# Patient Record
Sex: MALE | Race: BLACK OR AFRICAN AMERICAN | NOT HISPANIC OR LATINO | URBAN - METROPOLITAN AREA
[De-identification: names, ages, dates, MRNs, and addresses within clinical notes are randomized per-mention and may not be internally consistent; named-entity substitution may affect disease eponyms.]

---

## 2021-01-25 ENCOUNTER — EMERGENCY (EMERGENCY)
Facility: HOSPITAL | Age: 51
LOS: 1 days | Discharge: HOME | End: 2021-01-25
Attending: EMERGENCY MEDICINE | Admitting: EMERGENCY MEDICINE
Payer: MEDICAID

## 2021-01-25 VITALS
HEART RATE: 82 BPM | TEMPERATURE: 99 F | OXYGEN SATURATION: 100 % | RESPIRATION RATE: 17 BRPM | SYSTOLIC BLOOD PRESSURE: 121 MMHG | DIASTOLIC BLOOD PRESSURE: 69 MMHG

## 2021-01-25 VITALS
RESPIRATION RATE: 18 BRPM | HEART RATE: 95 BPM | SYSTOLIC BLOOD PRESSURE: 145 MMHG | TEMPERATURE: 98 F | DIASTOLIC BLOOD PRESSURE: 100 MMHG | OXYGEN SATURATION: 99 %

## 2021-01-25 LAB
ALBUMIN SERPL ELPH-MCNC: 4.1 G/DL — SIGNIFICANT CHANGE UP (ref 3.3–5)
ALP SERPL-CCNC: 86 U/L — SIGNIFICANT CHANGE UP (ref 40–120)
ALT FLD-CCNC: 8 U/L — SIGNIFICANT CHANGE UP (ref 4–41)
ANION GAP SERPL CALC-SCNC: 12 MMOL/L — SIGNIFICANT CHANGE UP (ref 7–14)
AST SERPL-CCNC: 9 U/L — SIGNIFICANT CHANGE UP (ref 4–40)
BASOPHILS # BLD AUTO: 0.02 K/UL — SIGNIFICANT CHANGE UP (ref 0–0.2)
BASOPHILS NFR BLD AUTO: 0.2 % — SIGNIFICANT CHANGE UP (ref 0–2)
BILIRUB SERPL-MCNC: 0.8 MG/DL — SIGNIFICANT CHANGE UP (ref 0.2–1.2)
BUN SERPL-MCNC: 15 MG/DL — SIGNIFICANT CHANGE UP (ref 7–23)
CALCIUM SERPL-MCNC: 8.9 MG/DL — SIGNIFICANT CHANGE UP (ref 8.4–10.5)
CHLORIDE SERPL-SCNC: 104 MMOL/L — SIGNIFICANT CHANGE UP (ref 98–107)
CO2 SERPL-SCNC: 26 MMOL/L — SIGNIFICANT CHANGE UP (ref 22–31)
CREAT SERPL-MCNC: 1.2 MG/DL — SIGNIFICANT CHANGE UP (ref 0.5–1.3)
EOSINOPHIL # BLD AUTO: 0.01 K/UL — SIGNIFICANT CHANGE UP (ref 0–0.5)
EOSINOPHIL NFR BLD AUTO: 0.1 % — SIGNIFICANT CHANGE UP (ref 0–6)
GLUCOSE SERPL-MCNC: 91 MG/DL — SIGNIFICANT CHANGE UP (ref 70–99)
HCT VFR BLD CALC: 47.8 % — SIGNIFICANT CHANGE UP (ref 39–50)
HGB BLD-MCNC: 14.9 G/DL — SIGNIFICANT CHANGE UP (ref 13–17)
IANC: 6.93 K/UL — SIGNIFICANT CHANGE UP (ref 1.5–8.5)
IMM GRANULOCYTES NFR BLD AUTO: 0.8 % — SIGNIFICANT CHANGE UP (ref 0–1.5)
LYMPHOCYTES # BLD AUTO: 2.05 K/UL — SIGNIFICANT CHANGE UP (ref 1–3.3)
LYMPHOCYTES # BLD AUTO: 20 % — SIGNIFICANT CHANGE UP (ref 13–44)
MAGNESIUM SERPL-MCNC: 1.9 MG/DL — SIGNIFICANT CHANGE UP (ref 1.6–2.6)
MCHC RBC-ENTMCNC: 28.8 PG — SIGNIFICANT CHANGE UP (ref 27–34)
MCHC RBC-ENTMCNC: 31.2 GM/DL — LOW (ref 32–36)
MCV RBC AUTO: 92.3 FL — SIGNIFICANT CHANGE UP (ref 80–100)
MONOCYTES # BLD AUTO: 1.15 K/UL — HIGH (ref 0–0.9)
MONOCYTES NFR BLD AUTO: 11.2 % — SIGNIFICANT CHANGE UP (ref 2–14)
NEUTROPHILS # BLD AUTO: 6.93 K/UL — SIGNIFICANT CHANGE UP (ref 1.8–7.4)
NEUTROPHILS NFR BLD AUTO: 67.7 % — SIGNIFICANT CHANGE UP (ref 43–77)
NRBC # BLD: 0 /100 WBCS — SIGNIFICANT CHANGE UP
NRBC # FLD: 0 K/UL — SIGNIFICANT CHANGE UP
PHOSPHATE SERPL-MCNC: 2.7 MG/DL — SIGNIFICANT CHANGE UP (ref 2.5–4.5)
PLATELET # BLD AUTO: 187 K/UL — SIGNIFICANT CHANGE UP (ref 150–400)
POTASSIUM SERPL-MCNC: 4.1 MMOL/L — SIGNIFICANT CHANGE UP (ref 3.5–5.3)
POTASSIUM SERPL-SCNC: 4.1 MMOL/L — SIGNIFICANT CHANGE UP (ref 3.5–5.3)
PROT SERPL-MCNC: 7.4 G/DL — SIGNIFICANT CHANGE UP (ref 6–8.3)
RBC # BLD: 5.18 M/UL — SIGNIFICANT CHANGE UP (ref 4.2–5.8)
RBC # FLD: 12.7 % — SIGNIFICANT CHANGE UP (ref 10.3–14.5)
SODIUM SERPL-SCNC: 142 MMOL/L — SIGNIFICANT CHANGE UP (ref 135–145)
WBC # BLD: 10.24 K/UL — SIGNIFICANT CHANGE UP (ref 3.8–10.5)
WBC # FLD AUTO: 10.24 K/UL — SIGNIFICANT CHANGE UP (ref 3.8–10.5)

## 2021-01-25 PROCEDURE — 71045 X-RAY EXAM CHEST 1 VIEW: CPT | Mod: 26

## 2021-01-25 PROCEDURE — 99283 EMERGENCY DEPT VISIT LOW MDM: CPT

## 2021-01-25 NOTE — ED ADULT TRIAGE NOTE - CHIEF COMPLAINT QUOTE
pt w hx of achalasia states "I need a barium swallow and my esophagus stretched" pt reports procedure must be done every 6 months , and is having difficulty swallowing. pt maintaining airway, able to clear secretions. pt denies chest pain, sob.

## 2021-01-25 NOTE — ED PROVIDER NOTE - NS ED ROS FT
CONST: no fevers, no chills, no lightheadedness  HEENT: no vision change, no sore throat + dysphagia   CV: no chest pain, no palpitations  RESP: no cough, no shortness of breath  ABD: no abdominal pain, + vomiting, no diarrhea   : no dysuria, no hematuria  ENDO: no frequent urination, no unusual thirst  MSK: no musculoskeletal pain  NEURO: no headache, no focal weakness, no loss of sensation  SKIN:  no rash

## 2021-01-25 NOTE — ED PROVIDER NOTE - NSFOLLOWUPINSTRUCTIONS_ED_ALL_ED_FT
Please follow up with your primary care provider for further concerns you may have regarding your general health. Attached you will find your results from today's visit. Continue taking your medications as prescribed and keep your upcoming medical appointments.    Please follow up once you are contacted regarding a follow up appointment with the gastrointestinal specialist in several days.

## 2021-01-25 NOTE — ED PROVIDER NOTE - DOMESTIC TRAVEL HIGH RISK QUESTION
No [FreeTextEntry1] : Lamotrigine 150mg BID   12/12 Lamotrigine level therapeutic at 4.6\par \par UPDATE:  \par Last sz 9/2019.\par He is doing well with no reported interval seizures.\par His mood is good an he has no complaints or side effects.\par Some sleep issues/sleep hygiene (goes to bed around 12-5AM).  Dec caffiene\par exercise\par \par PMHX: Healthy other than onset of seizures around 2017. GTCS, no warning, amnesia, diurnal.  3min duration. sore afterwards, scrapes to face/hands. tongue bite. confusion. wakes up with EMS.\par Chronic sleep issues.  Irregular sleep habits. q4-8mo with sleep deprivation. Estim 5-6 szs lifetime\par H/o MJ use. No current other ETOH or drug use.\par No myoclonus\par \par Saw one neurologist 2/2019, took LEV for 1 week, then d/c''d due to mood changes.\par \par No FmHx seizures.\par  [Right Handed] : right handed [Stress] : stress [Sleep Deprivation] : sleep deprivation [Tongue Biting] : tongue biting [Postictal Confusion and Lethargy] : postictal confusion and lethargy [[place] Status Epilepticus] : no [] : yes [Levetiracetam (Keppra)] : levetiracetam (Keppra)

## 2021-01-25 NOTE — ED PROVIDER NOTE - ATTENDING CONTRIBUTION TO CARE
I performed a history and physical exam of the patient and discussed their management with the resident and /or advanced care provider. I reviewed the resident and /or ACP's note and agree with the documented findings and plan of care. My medical decision making and observations are found above.  Lungs clear, abd soft, awake alert, mouth moist.

## 2021-01-25 NOTE — ED ADULT NURSE NOTE - OBJECTIVE STATEMENT
Pt received AOx4, ambulatory at baseline presents to the ED with chief complaint of difficulty swallowing. Pt states he has PMHX of achalasia and gets his esophagus dilated every 6 months. Pt received AOx4, ambulatory at baseline presents to the ED with chief complaint of difficulty swallowing. Pt states he has PMHX of achalasia and gets his esophagus dilated every 6 months. Pt states he has been unable to keep anything down for the past couple of weeks. Complaining of hunger at the moment. Vs as noted. 20G placed in LAC. Labs sent. No other pertinent MHX. Denies CP, SOB, NVD, dysuria, chills, fever, cough at the moment. Breathing even and unlabored, saturated 100% RA. Fluent Speech.

## 2021-01-25 NOTE — ED PROVIDER NOTE - PHYSICAL EXAMINATION
Gen: WDWN, NAD  HEENT: EOMI, no nasal discharge, mucous membranes moist  CV: RRR, no M/R/G  Resp: CTAB, no W/R/R  GI: Abdomen soft non-distended, NTTP, no masses  MSK: No open wounds, no bruising, no LE edema  Neuro: A&Ox4, following commands, moving all four extremities spontaneously  Psych: appropriate mood, denies AH, VH, SI

## 2021-01-25 NOTE — ED ADULT NURSE NOTE - NSIMPLEMENTINTERV_GEN_ALL_ED
Implemented All Universal Safety Interventions:  [place] to call system. Call bell, personal items and telephone within reach. Instruct patient to call for assistance. Room bathroom lighting operational. Non-slip footwear when patient is off stretcher. Physically safe environment: no spills, clutter or unnecessary equipment. Stretcher in lowest position, wheels locked, appropriate side rails in place.

## 2021-01-25 NOTE — ED PROVIDER NOTE - OBJECTIVE STATEMENT
50M hx achalasia (reports hx >7 pneumatic dilations, hx of failed surgical procedure) p/w difficulty swallowing. States in past has required esophageal dilations "every 6 months" for >10 years however for past 2 years has not received procedure. 50M hx achalasia (reports hx >7 pneumatic dilations, hx of failed surgical procedure of unknown type) p/w difficulty swallowing. States in past has required esophageal dilations "every 6 months" for >10 years however for past 2 years has not received procedure, endorses decreased PO intake 2/2 regurgitation, inability to tolerate solids/liquids. Has not seeked outpatient help, comes in requesting barium swallow & surgical evaluation.

## 2021-01-25 NOTE — ED PROVIDER NOTE - CLINICAL SUMMARY MEDICAL DECISION MAKING FREE TEXT BOX
Demetrio: increasing difficulty swallowing. Hx of achalasia for years. use to have dilatations not recently. Will assess dehydration and nutritional assessment, hydrate if nessisary and ensure access to GI. Demetrio: increasing difficulty swallowing. Hx of achalasia for years. use to have dilatations not recently. Will assess dehydration and nutritional assessment, hydrate if necessary and ensure access to GI.

## 2021-01-25 NOTE — ED PROVIDER NOTE - PATIENT PORTAL LINK FT
You can access the FollowMyHealth Patient Portal offered by [facility] by registering at the following website: [email or web address]. By joining [facility]’s FollowMyHealth portal, you will also be able to view your health information using other applications (apps) compatible with our system.

## 2021-02-08 PROBLEM — Z00.00 ENCOUNTER FOR PREVENTIVE HEALTH EXAMINATION: Status: ACTIVE | Noted: 2021-02-08

## 2021-02-09 ENCOUNTER — APPOINTMENT (OUTPATIENT)
Dept: GASTROENTEROLOGY | Facility: CLINIC | Age: 51
End: 2021-02-09
Payer: MEDICAID

## 2021-02-09 PROCEDURE — 99204 OFFICE O/P NEW MOD 45 MIN: CPT

## 2021-02-09 PROCEDURE — 99072 ADDL SUPL MATRL&STAF TM PHE: CPT

## 2021-02-09 NOTE — HISTORY OF PRESENT ILLNESS
[FreeTextEntry1] : 51 yo male with h/o achalasia, since 2001, s/p surgery details not known in 2001. Surgery did not work, s/p dilation of esophagus,2019 and then was told to have botox shots.\par \par Patient reports difficulty swallowing liquids and solids, throwing up liquids. Last episode of vomiting last night. 20lb weight loss in past few months, no abdominal pain. GERD every week.normal bms, no brbpr, no melena.\par \par no family h/o colon ,esophageal or gastric cancer

## 2021-02-09 NOTE — PHYSICAL EXAM
[General Appearance - Alert] : alert [General Appearance - In No Acute Distress] : in no acute distress [General Appearance - Well Nourished] : well nourished [Sclera] : the sclera and conjunctiva were normal [General Appearance - Well Developed] : well developed [Respiration, Rhythm And Depth] : normal respiratory rhythm and effort [Auscultation Breath Sounds / Voice Sounds] : lungs were clear to auscultation bilaterally [Heart Sounds] : normal S1 and S2 [Bowel Sounds] : normal bowel sounds [Abdomen Soft] : soft [Abdomen Tenderness] : non-tender [No CVA Tenderness] : no ~M costovertebral angle tenderness [] : no rash [Skin Lesions] : no skin lesions [Hearing Threshold Finger Rub Not [place]] : hearing was normal [Sensation] : the sensory exam was normal to light touch and pinprick [No Focal Deficits] : no focal deficits [Oriented To Time, Place, And Person] : oriented to person, place, and time [FreeTextEntry1] : well healed scar

## 2021-02-09 NOTE — REVIEW OF SYSTEMS
[As Noted in HPI] : as noted in HPI [Fever] : no fever [Chills] : no chills [Nosebleeds] : no nosebleeds [Eyesight Problems] : no eyesight problems [Cough] : no cough [SOB on Exertion] : no shortness of breath during exertion [Hesitancy] : no urinary hesitancy [Joint Swelling] : no joint swelling [Itching] : no itching [Confused] : no confusion [Anxiety] : no anxiety [Depression] : no depression [Muscle Weakness] : no muscle weakness [Easy Bleeding] : no tendency for easy bleeding [Easy Bruising] : no tendency for easy bruising

## 2021-02-09 NOTE — ASSESSMENT
[FreeTextEntry1] : h/o achalasia, since 2001, s/p failed esophageal surgery per patient in 2001 and has received multiple dilations since, details not available. Patient was in [place] for his procedures. NOw with recurrent dysphagia to solids and liquids\par \par plan ;full liquid diet,pureed food\par          BArium esophagram\par            pending above result will refer to esophageal specialist  for egd/manometry and treatment options

## 2021-03-08 ENCOUNTER — APPOINTMENT (OUTPATIENT)
Dept: RADIOLOGY | Facility: HOSPITAL | Age: 51
End: 2021-03-08
Payer: MEDICAID

## 2021-03-08 ENCOUNTER — OUTPATIENT (OUTPATIENT)
Dept: OUTPATIENT SERVICES | Facility: HOSPITAL | Age: 51
LOS: 1 days | End: 2021-03-08

## 2021-03-08 ENCOUNTER — RESULT REVIEW (OUTPATIENT)
Age: 51
End: 2021-03-08

## 2021-03-08 DIAGNOSIS — K22.0 ACHALASIA OF CARDIA: ICD-10-CM

## 2021-03-08 PROCEDURE — 74220 X-RAY XM ESOPHAGUS 1CNTRST: CPT | Mod: 26

## 2021-03-10 ENCOUNTER — NON-APPOINTMENT (OUTPATIENT)
Age: 51
End: 2021-03-10

## 2021-04-06 ENCOUNTER — APPOINTMENT (OUTPATIENT)
Dept: GASTROENTEROLOGY | Facility: CLINIC | Age: 51
End: 2021-04-06
Payer: MEDICAID

## 2021-04-06 VITALS
OXYGEN SATURATION: 97 % | TEMPERATURE: 98 F | SYSTOLIC BLOOD PRESSURE: 130 MMHG | RESPIRATION RATE: 18 BRPM | DIASTOLIC BLOOD PRESSURE: 60 MMHG | BODY MASS INDEX: 23.8 KG/M2 | HEART RATE: 94 BPM | HEIGHT: 71 IN | WEIGHT: 170 LBS

## 2021-04-06 DIAGNOSIS — Z78.9 OTHER SPECIFIED HEALTH STATUS: ICD-10-CM

## 2021-04-06 DIAGNOSIS — R07.89 OTHER CHEST PAIN: ICD-10-CM

## 2021-04-06 PROCEDURE — 99072 ADDL SUPL MATRL&STAF TM PHE: CPT

## 2021-04-06 PROCEDURE — 99205 OFFICE O/P NEW HI 60 MIN: CPT

## 2021-04-06 NOTE — PHYSICAL EXAM
[General Appearance - Alert] : alert [General Appearance - In No Acute Distress] : in no acute distress [General Appearance - Well Nourished] : well nourished [Extraocular Movements] : extraocular movements were intact [Sclera] : the sclera and conjunctiva were normal [Outer Ear] : the ears and nose were normal in appearance [Hearing Threshold Finger Rub Not [place]] : hearing was normal [Neck Appearance] : the appearance of the neck was normal [Neck Cervical Mass (___cm)] : no neck mass was observed [Respiration, Rhythm And Depth] : normal respiratory rhythm and effort [Exaggerated Use Of Accessory Muscles For Inspiration] : no accessory muscle use [Auscultation Breath Sounds / Voice Sounds] : lungs were clear to auscultation bilaterally [Heart Rate And Rhythm] : heart rate was normal and rhythm regular [Heart Sounds] : normal S1 and S2 [Bowel Sounds] : normal bowel sounds [Edema] : there was no peripheral edema [Abdomen Soft] : soft [Abdomen Tenderness] : non-tender [Abdomen Mass (___ Cm)] : no abdominal mass palpated [Cervical Lymph Nodes Enlarged Posterior Bilaterally] : posterior cervical [Cervical Lymph Nodes Enlarged Anterior Bilaterally] : anterior cervical [No CVA Tenderness] : no ~M costovertebral angle tenderness [Supraclavicular Lymph Nodes Enlarged Bilaterally] : supraclavicular [No Spinal Tenderness] : no spinal tenderness [Abnormal Walk] : normal gait [Involuntary Movements] : no involuntary movements were seen [Skin Color & Pigmentation] : normal skin color and pigmentation [] : no rash [No Focal Deficits] : no focal deficits [Impaired Insight] : insight and judgment were intact [Affect] : the affect was normal [FreeTextEntry1] : aox3

## 2021-04-06 NOTE — HISTORY OF PRESENT ILLNESS
[FreeTextEntry1] : 51 yo M pmh Achalasia s/p 'surgery' 2001, s/p dilation 2019 (suspected 30 mm PD) who presents to establish GI care for achalasia.\par \par Achalasia:\par 2001 - started having vomiting and regurgitation\par Having severe dysphagia to liquids and solids\par Having significant weight loss\par Ultimately had BaS and suspected to have Achalasia\par Does not think he had manometry at that time, though is unsure (he had it at some point)\par Underwent surgery -> suspected to be myotomy though he reports the cut was 'too deep' and there were issues\par \par Had failure within 4 weeks with recurrence of symptoms\par Underwent dilations over next few years\par He believes the last one was about 2 years ago\par He believes they were Pneumatic Dilations\par \par Overall he just didn't really manage it and just 'dealt with it'\par He was incarcerated in [place] for much of the time - so wasn’t in full control of his own care\par He went to see Dr. Rojas for evaluation, got a BaS and was referred here\par \par Currently:\par Dysphagia - Every Meal\par Regurgitation - every week\par Chest Pain - Intermittent\par Heartburn daily \par Weight loss - 40 lbs\par \par Workup - \par EGD - 2019\par BaS - 2021\par E-mano - 2005 \par \par Prior GI note:\par Chandini:\par Achalasia (530.0) (K22.0)\par History of Laparoscopy\par No pertinent family history : Family History\par \par h/o achalasia, since 2001, s/p failed esophageal surgery per patient in 2001 and has received multiple dilations since, details not available. Patient was in [place] for his procedures. NOw with recurrent dysphagia to solids and liquids\par \par plan ;full liquid diet,pureed food\par  BArium esophagram\par  pending above result will refer to esophageal specialist for egd/manometry and treatment options. \par \par \par Prior Workup:\par BaS 2/2021\par FINDINGS:\par \par Preliminary  radiograph of the chest is unremarkable.\par \par The patient swallowed barium without difficulty. The esophagus is markedly dilated with intraluminal debris. There is no abnormal extrinsic mass effect. Contrast passes slowly through the gastroesophageal junction into a normal-appearing stomach. No gastroesophageal reflux was demonstrated during this examination. The swallowed barium tablet is retained above the gastroesophageal junction, within ingested barium pole.\par \par IMPRESSION:\par Dilated debris-filled esophagus consistent with known achalasia.\par Oral contrast transits through the region of the gastroesophageal junction.

## 2021-04-06 NOTE — ASSESSMENT
[FreeTextEntry1] : 49 yo M pmh Achalasia s/p 'surgery' 2001, s/p dilation 2019 (suspected 30 mm PD) who presents to establish GI care.  Having clearly significant symptoms with eckardt of 7.  Unclear if this is end-stage achalasia or not given some dilation and angulation on Barium Swallow and prior failed therapies.  Needs full evaluation.\par \par Impression:\par 1) Achalasia - Eckardt = 7\par 2) Dysphagia\par 3) Weight Loss\par 4) Avg risk for CRC\par \par Plan:\par 1) EGD + E-mano +/- EndoFLIP\par 2) Pending above, will need to consider if myotomy would be of clinical benefit\par 3) We had extended discussion regarding achalasia and its natural progression.  We discussed potential treatment options both for end-stage achalasia (i.e. esophagectomy) and for non-end stage achalasia (i.e. myotomy, PD, botox, meds).\par 4) After addressing the above, we will need to get him set up for CRC screening via colonoscopy or other modality\par 5) Extended visit with multiple questions discussed.  Review of Barium performed independently. Plan agreed upon\par 6) RV pending above

## 2021-04-25 ENCOUNTER — APPOINTMENT (OUTPATIENT)
Dept: DISASTER EMERGENCY | Facility: CLINIC | Age: 51
End: 2021-04-25

## 2021-04-25 LAB — SARS-COV-2 N GENE NPH QL NAA+PROBE: NOT DETECTED

## 2021-04-28 ENCOUNTER — OUTPATIENT (OUTPATIENT)
Dept: OUTPATIENT SERVICES | Facility: HOSPITAL | Age: 51
LOS: 1 days | End: 2021-04-28
Payer: MEDICAID

## 2021-04-28 ENCOUNTER — APPOINTMENT (OUTPATIENT)
Dept: GASTROENTEROLOGY | Facility: HOSPITAL | Age: 51
End: 2021-04-28

## 2021-04-28 VITALS
WEIGHT: 169.98 LBS | OXYGEN SATURATION: 98 % | HEIGHT: 71 IN | SYSTOLIC BLOOD PRESSURE: 132 MMHG | DIASTOLIC BLOOD PRESSURE: 77 MMHG | TEMPERATURE: 98 F | HEART RATE: 70 BPM | RESPIRATION RATE: 14 BRPM

## 2021-04-28 VITALS
SYSTOLIC BLOOD PRESSURE: 125 MMHG | OXYGEN SATURATION: 99 % | HEART RATE: 66 BPM | RESPIRATION RATE: 10 BRPM | DIASTOLIC BLOOD PRESSURE: 78 MMHG

## 2021-04-28 DIAGNOSIS — Z98.890 OTHER SPECIFIED POSTPROCEDURAL STATES: Chronic | ICD-10-CM

## 2021-04-28 DIAGNOSIS — K22.0 ACHALASIA OF CARDIA: ICD-10-CM

## 2021-04-28 DIAGNOSIS — S49.90XA UNSPECIFIED INJURY OF SHOULDER AND UPPER ARM, UNSPECIFIED ARM, INITIAL ENCOUNTER: Chronic | ICD-10-CM

## 2021-04-28 PROCEDURE — 43235 EGD DIAGNOSTIC BRUSH WASH: CPT | Mod: 52

## 2021-04-28 PROCEDURE — 43235 EGD DIAGNOSTIC BRUSH WASH: CPT | Mod: 53

## 2021-04-28 RX ORDER — SODIUM CHLORIDE 9 MG/ML
500 INJECTION INTRAMUSCULAR; INTRAVENOUS; SUBCUTANEOUS
Refills: 0 | Status: COMPLETED | OUTPATIENT
Start: 2021-04-28 | End: 2021-04-28

## 2021-04-28 RX ADMIN — SODIUM CHLORIDE 30 MILLILITER(S): 9 INJECTION INTRAMUSCULAR; INTRAVENOUS; SUBCUTANEOUS at 08:13

## 2021-04-28 NOTE — PRE PROCEDURE NOTE - PRE PROCEDURE EVALUATION
Attending Physician:              Crsithian Lazaro MD MSEd    Indication for Procedure    Achalasia            PAST MEDICAL & SURGICAL HISTORY:  Achalasia    Shoulder injury    History of esophagogastroduodenoscopy (EGD)          See Allscripts Note for further details  ALLERGIES:  No Known Allergies    HOME MEDICATIONS:      See Allscripts Note for further details    AICD/PPM: [ ] yes   [x ] no    PERTINENT LAB DATA:                      PHYSICAL EXAMINATION:    Height (cm): 180.3  Weight (kg): 77.1  BMI (kg/m2): 23.7  BSA (m2): 1.97T(C): 36.5  HR: 70  BP: 132/77  RR: 14  SpO2: 98%    Constitutional: NAD  HEENT: PERRLA, EOMI,    Neck:  No JVD  Respiratory: CTAB/L  Cardiovascular: S1 and S2  Gastrointestinal: BS+, soft, NT/ND  Extremities: No peripheral edema  Neurological: A/O x 3, no focal deficits  Psychiatric: Normal mood, normal affect  Skin: No rashes    ASA Class: I [ ]  II [ ]  III [x ]  IV [ ]    COMMENTS:    The patient is a suitable candidate for the planned procedure unless box checked [ ]  No, explain:

## 2021-04-28 NOTE — PRE-ANESTHESIA EVALUATION ADULT - NSANTHOSAYNRD_GEN_A_CORE
No. TRACY screening performed.  STOP BANG Legend: 0-2 = LOW Risk; 3-4 = INTERMEDIATE Risk; 5-8 = HIGH Risk

## 2021-05-02 ENCOUNTER — APPOINTMENT (OUTPATIENT)
Dept: DISASTER EMERGENCY | Facility: CLINIC | Age: 51
End: 2021-05-02

## 2021-05-02 LAB — SARS-COV-2 N GENE NPH QL NAA+PROBE: NOT DETECTED

## 2021-05-06 PROBLEM — K22.0 ACHALASIA OF CARDIA: Chronic | Status: ACTIVE | Noted: 2021-04-28

## 2021-05-09 ENCOUNTER — APPOINTMENT (OUTPATIENT)
Dept: DISASTER EMERGENCY | Facility: CLINIC | Age: 51
End: 2021-05-09

## 2021-05-09 LAB — SARS-COV-2 N GENE NPH QL NAA+PROBE: NOT DETECTED

## 2021-05-10 ENCOUNTER — NON-APPOINTMENT (OUTPATIENT)
Age: 51
End: 2021-05-10

## 2021-05-12 ENCOUNTER — OUTPATIENT (OUTPATIENT)
Dept: OUTPATIENT SERVICES | Facility: HOSPITAL | Age: 51
LOS: 1 days | End: 2021-05-12
Payer: MEDICAID

## 2021-05-12 ENCOUNTER — APPOINTMENT (OUTPATIENT)
Dept: GASTROENTEROLOGY | Facility: HOSPITAL | Age: 51
End: 2021-05-12

## 2021-05-12 VITALS
HEART RATE: 64 BPM | DIASTOLIC BLOOD PRESSURE: 76 MMHG | OXYGEN SATURATION: 98 % | WEIGHT: 164.02 LBS | TEMPERATURE: 98 F | HEIGHT: 66 IN | RESPIRATION RATE: 136 BRPM | SYSTOLIC BLOOD PRESSURE: 117 MMHG

## 2021-05-12 VITALS
OXYGEN SATURATION: 97 % | SYSTOLIC BLOOD PRESSURE: 114 MMHG | HEART RATE: 72 BPM | RESPIRATION RATE: 14 BRPM | DIASTOLIC BLOOD PRESSURE: 88 MMHG

## 2021-05-12 DIAGNOSIS — S49.90XA UNSPECIFIED INJURY OF SHOULDER AND UPPER ARM, UNSPECIFIED ARM, INITIAL ENCOUNTER: Chronic | ICD-10-CM

## 2021-05-12 DIAGNOSIS — Z98.890 OTHER SPECIFIED POSTPROCEDURAL STATES: Chronic | ICD-10-CM

## 2021-05-12 DIAGNOSIS — R13.10 DYSPHAGIA, UNSPECIFIED: ICD-10-CM

## 2021-05-12 DIAGNOSIS — K22.0 ACHALASIA OF CARDIA: ICD-10-CM

## 2021-05-12 PROCEDURE — 43235 EGD DIAGNOSTIC BRUSH WASH: CPT | Mod: 74

## 2021-05-12 PROCEDURE — 43235 EGD DIAGNOSTIC BRUSH WASH: CPT | Mod: 52

## 2021-05-22 ENCOUNTER — APPOINTMENT (OUTPATIENT)
Dept: DISASTER EMERGENCY | Facility: CLINIC | Age: 51
End: 2021-05-22

## 2021-05-23 LAB — SARS-COV-2 N GENE NPH QL NAA+PROBE: NOT DETECTED

## 2021-05-24 ENCOUNTER — OUTPATIENT (OUTPATIENT)
Dept: OUTPATIENT SERVICES | Facility: HOSPITAL | Age: 51
LOS: 1 days | Discharge: HOME | End: 2021-05-24
Payer: MEDICAID

## 2021-05-24 ENCOUNTER — APPOINTMENT (OUTPATIENT)
Dept: GASTROENTEROLOGY | Facility: HOSPITAL | Age: 51
End: 2021-05-24

## 2021-05-24 VITALS
TEMPERATURE: 98 F | SYSTOLIC BLOOD PRESSURE: 110 MMHG | DIASTOLIC BLOOD PRESSURE: 72 MMHG | WEIGHT: 164.91 LBS | HEART RATE: 78 BPM | RESPIRATION RATE: 17 BRPM | OXYGEN SATURATION: 99 % | HEIGHT: 71 IN

## 2021-05-24 VITALS
HEART RATE: 53 BPM | OXYGEN SATURATION: 95 % | RESPIRATION RATE: 20 BRPM | DIASTOLIC BLOOD PRESSURE: 79 MMHG | SYSTOLIC BLOOD PRESSURE: 120 MMHG

## 2021-05-24 DIAGNOSIS — S49.90XA UNSPECIFIED INJURY OF SHOULDER AND UPPER ARM, UNSPECIFIED ARM, INITIAL ENCOUNTER: Chronic | ICD-10-CM

## 2021-05-24 DIAGNOSIS — Z98.890 OTHER SPECIFIED POSTPROCEDURAL STATES: Chronic | ICD-10-CM

## 2021-05-24 DIAGNOSIS — K22.0 ACHALASIA OF CARDIA: ICD-10-CM

## 2021-05-24 PROCEDURE — 91010 ESOPHAGUS MOTILITY STUDY: CPT | Mod: 26

## 2021-05-24 PROCEDURE — 91040 ESOPH BALLOON DISTENSION TST: CPT | Mod: 26

## 2021-05-24 PROCEDURE — 43247 EGD REMOVE FOREIGN BODY: CPT

## 2021-05-24 PROCEDURE — 91037 ESOPH IMPED FUNCTION TEST: CPT | Mod: 26

## 2021-05-24 RX ORDER — SODIUM CHLORIDE 9 MG/ML
500 INJECTION, SOLUTION INTRAVENOUS
Refills: 0 | Status: DISCONTINUED | OUTPATIENT
Start: 2021-05-24 | End: 2021-06-07

## 2021-05-26 ENCOUNTER — APPOINTMENT (OUTPATIENT)
Dept: GASTROENTEROLOGY | Facility: HOSPITAL | Age: 51
End: 2021-05-26

## 2021-06-10 ENCOUNTER — NON-APPOINTMENT (OUTPATIENT)
Age: 51
End: 2021-06-10

## 2021-06-11 ENCOUNTER — NON-APPOINTMENT (OUTPATIENT)
Age: 51
End: 2021-06-11

## 2021-06-11 ENCOUNTER — APPOINTMENT (OUTPATIENT)
Dept: GASTROENTEROLOGY | Facility: CLINIC | Age: 51
End: 2021-06-11
Payer: MEDICAID

## 2021-06-11 PROCEDURE — 99443: CPT

## 2021-06-14 ENCOUNTER — NON-APPOINTMENT (OUTPATIENT)
Age: 51
End: 2021-06-14

## 2021-06-14 ENCOUNTER — APPOINTMENT (OUTPATIENT)
Dept: THORACIC SURGERY | Facility: CLINIC | Age: 51
End: 2021-06-14
Payer: MEDICAID

## 2021-06-14 VITALS
HEART RATE: 67 BPM | BODY MASS INDEX: 24.08 KG/M2 | OXYGEN SATURATION: 96 % | RESPIRATION RATE: 16 BRPM | TEMPERATURE: 98.7 F | WEIGHT: 172 LBS | HEIGHT: 71 IN | DIASTOLIC BLOOD PRESSURE: 75 MMHG | SYSTOLIC BLOOD PRESSURE: 111 MMHG

## 2021-06-14 DIAGNOSIS — R63.4 ABNORMAL WEIGHT LOSS: ICD-10-CM

## 2021-06-14 PROCEDURE — 99215 OFFICE O/P EST HI 40 MIN: CPT

## 2021-06-15 NOTE — CONSULT LETTER
[Dear  ___] : Dear  [unfilled], [Consult Letter:] : I had the pleasure of evaluating your patient, [unfilled]. [( Thank you for referring [unfilled] for consultation for _____ )] : Thank you for referring [unfilled] for consultation for [unfilled] [Please see my note below.] : Please see my note below. [Consult Closing:] : Thank you very much for allowing me to participate in the care of this patient.  If you have any questions, please do not hesitate to contact me. [Sincerely,] : Sincerely, [FreeTextEntry2] : Dr. Cristhian Lazaro (GI/ref) [FreeTextEntry3] : Anh Goodman MD\par Attending Surgeon\par Division of Thoracic Surgery\par , [facility] [employer] [facility] at \[facility]\""/[facility]\par

## 2021-06-15 NOTE — ASSESSMENT
[FreeTextEntry1] : Mr. PRATEEK THOMPSON, 50 year old male, former smoker, w/ hx of achalasia s/p Laparoscopy myotomy in ~2001, who presented to GI Dr. Lazaro c/o wt loss of 30 lbs within the past 2-3 months, +N/V, food regurgitation (solids and liquids), and 10/10 acid reflux.\par \par Barium esophagram on 3/8/21: dilated debris-filled esophagus c/w known achalasia. \par \par EGD on 5/24/21 showed moderately dilated lumen of esophagus and food was found in the entire esophagus. GEJ was somewhat tight with a suture present in GEJ from prior fundoplication which appeared to potential displaced. \par \par Manometry on 5/24/21 showed incomplete LES relaxation and 100% absent peristalsis on all swallows\par \par I have reviewed the patient's medical records and diagnostic images at time of this office consultation and have made the following recommendation:\par 1. Barium Esophagram and Manometry reviewed, I recommended surgery, but I will need to review his previous Op note from [facility], also discussed case with Dr. Lazaro, we suspected that patient might fail another Heller Myotomy and might have to proceed with esophagectomy secondary to dilated esophagus. Will plan for Robotic-assisted three-holes esophagectomy. Risks and benefits and alternatives explained to patient, all questions answered, patient agreed to proceed with surgery.\par 2. PFTs\par 3. Cardiac clearance, and PST\par \par \par I personally performed the services described in the documentation, reviewed the documentation recorded by the scribe in my presence and it accurately and completely records my words and actions. \par \par I, Gurjit Curtis, WES, am scribing for and the presence of Dr. Anh Goodman the following sections, HISTORY OF PRESENT ILLNESS, PAST MEDICAL/FAMILY/SOCIAL HISTORY; REVIEW OF SYSTEMS; VITAL SIGNS; PHYSICAL EXAM; DISPOSITION.\par

## 2021-06-15 NOTE — PHYSICAL EXAM
[General Appearance - Alert] : alert [General Appearance - In No Acute Distress] : in no acute distress [Sclera] : the sclera and conjunctiva were normal [PERRL With Normal Accommodation] : pupils were equal in size, round, and reactive to light [Extraocular Movements] : extraocular movements were intact [Outer Ear] : the ears and nose were normal in appearance [Oropharynx] : the oropharynx was normal [Neck Appearance] : the appearance of the neck was normal [Neck Cervical Mass (___cm)] : no neck mass was observed [Jugular Venous Distention Increased] : there was no jugular-venous distention [Thyroid Diffuse Enlargement] : the thyroid was not enlarged [Thyroid Nodule] : there were no palpable thyroid nodules [Auscultation Breath Sounds / Voice Sounds] : lungs were clear to auscultation bilaterally [Heart Rate And Rhythm] : heart rate was normal and rhythm regular [Heart Sounds] : normal S1 and S2 [Heart Sounds Gallop] : no gallops [Murmurs] : no murmurs [Heart Sounds Pericardial Friction Rub] : no pericardial rub [Examination Of The Chest] : the chest was normal in appearance [Chest Visual Inspection Thoracic Asymmetry] : no chest asymmetry [Diminished Respiratory Excursion] : normal chest expansion [2+] : left 2+ [Breast Appearance] : normal in appearance [Breast Palpation Mass] : no palpable masses [Bowel Sounds] : normal bowel sounds [Abdomen Soft] : soft [Abdomen Tenderness] : non-tender [Abdomen Mass (___ Cm)] : no abdominal mass palpated [Cervical Lymph Nodes Enlarged Posterior Bilaterally] : posterior cervical [Cervical Lymph Nodes Enlarged Anterior Bilaterally] : anterior cervical [Supraclavicular Lymph Nodes Enlarged Bilaterally] : supraclavicular [No CVA Tenderness] : no ~M costovertebral angle tenderness [No Spinal Tenderness] : no spinal tenderness [Abnormal Walk] : normal gait [Nail Clubbing] : no clubbing  or cyanosis of the fingernails [Musculoskeletal - Swelling] : no joint swelling seen [Skin Color & Pigmentation] : normal skin color and pigmentation [Motor Tone] : muscle strength and tone were normal [Skin Turgor] : normal skin turgor [] : no rash [Deep Tendon Reflexes (DTR)] : deep tendon reflexes were 2+ and symmetric [Sensation] : the sensory exam was normal to light touch and pinprick [No Focal Deficits] : no focal deficits [Oriented To Time, Place, And Person] : oriented to person, place, and time [Affect] : the affect was normal [Impaired Insight] : insight and judgment were intact [FreeTextEntry1] : deferred

## 2021-06-15 NOTE — DATA REVIEWED
[FreeTextEntry1] : I have independently reviewed patient's studies: \par Barium esophagram on 3/8/21: dilated debris-filled esophagus c/w known achalasia. \par \par EGD on 5/24/21 showed moderately dilated lumen of esophagus and food was found in the entire esophagus. GEJ was somewhat tight with a suture present in GEJ from prior fundoplication which appeared to potential displaced. \par \par Manometry on 5/24/21 showed incomplete LES relaxation and 100% absent peristalsis on all swallows\par \par

## 2021-06-15 NOTE — HISTORY OF PRESENT ILLNESS
[FreeTextEntry1] : Mr. PRATEEK THOMPSON, 50 year old male, former smoker, w/ hx of achalasia s/p Laparoscopy myotomy in ~2001, who presented to GI Dr. Lazaro c/o wt loss of 30 lbs within the past 2-3 months, +N/V, food regurgitation (solids and liquids), and 10/10 acid reflux.\par \par Barium esophagram on 3/8/21: dilated debris-filled esophagus c/w known achalasia. \par \par EGD on 5/24/21 showed moderately dilated lumen of esophagus and food was found in the entire esophagus. GEJ was somewhat tight with a suture present in GEJ from prior fundoplication which appeared to potential displaced. \par \par Manometry on 5/24/21 showed incomplete LES relaxation and 100% absent peristalsis on all swallows\par \par Pt presents today for CT Sx consultation, referred by Dr. Lazaro. \par Admits to significant wt loss of 30 lbs within the past 2-3 months, +N/V, food regurgitation (solids and liquids), and 10/10 acid reflux.\par

## 2021-06-22 ENCOUNTER — APPOINTMENT (OUTPATIENT)
Dept: CARDIOLOGY | Facility: CLINIC | Age: 51
End: 2021-06-22

## 2021-06-29 ENCOUNTER — NON-APPOINTMENT (OUTPATIENT)
Age: 51
End: 2021-06-29

## 2021-06-29 ENCOUNTER — APPOINTMENT (OUTPATIENT)
Dept: CARDIOLOGY | Facility: CLINIC | Age: 51
End: 2021-06-29
Payer: MEDICAID

## 2021-06-29 VITALS
BODY MASS INDEX: 21.77 KG/M2 | OXYGEN SATURATION: 98 % | TEMPERATURE: 94.6 F | DIASTOLIC BLOOD PRESSURE: 75 MMHG | HEIGHT: 71 IN | RESPIRATION RATE: 16 BRPM | HEART RATE: 81 BPM | WEIGHT: 155.5 LBS | SYSTOLIC BLOOD PRESSURE: 115 MMHG

## 2021-06-29 DIAGNOSIS — Z82.49 FAMILY HISTORY OF ISCHEMIC HEART DISEASE AND OTHER DISEASES OF THE CIRCULATORY SYSTEM: ICD-10-CM

## 2021-06-29 DIAGNOSIS — Z83.3 FAMILY HISTORY OF DIABETES MELLITUS: ICD-10-CM

## 2021-06-29 PROCEDURE — 93000 ELECTROCARDIOGRAM COMPLETE: CPT | Mod: NC

## 2021-06-29 PROCEDURE — 99204 OFFICE O/P NEW MOD 45 MIN: CPT

## 2021-06-29 RX ORDER — OMEPRAZOLE 40 MG/1
40 CAPSULE, DELAYED RELEASE ORAL
Qty: 1 | Refills: 1 | Status: DISCONTINUED | COMMUNITY
Start: 2021-02-09 | End: 2021-06-29

## 2021-06-29 RX ORDER — DICLOFENAC SODIUM 100 MG/1
100 TABLET, FILM COATED, EXTENDED RELEASE ORAL
Qty: 30 | Refills: 0 | Status: DISCONTINUED | COMMUNITY
Start: 2021-04-27 | End: 2021-06-29

## 2021-07-06 ENCOUNTER — APPOINTMENT (OUTPATIENT)
Dept: CV DIAGNOSITCS | Facility: HOSPITAL | Age: 51
End: 2021-07-06
Payer: MEDICAID

## 2021-07-06 ENCOUNTER — OUTPATIENT (OUTPATIENT)
Dept: OUTPATIENT SERVICES | Facility: HOSPITAL | Age: 51
LOS: 1 days | End: 2021-07-06

## 2021-07-06 DIAGNOSIS — Z98.890 OTHER SPECIFIED POSTPROCEDURAL STATES: Chronic | ICD-10-CM

## 2021-07-06 DIAGNOSIS — S49.90XA UNSPECIFIED INJURY OF SHOULDER AND UPPER ARM, UNSPECIFIED ARM, INITIAL ENCOUNTER: Chronic | ICD-10-CM

## 2021-07-06 DIAGNOSIS — Z01.818 ENCOUNTER FOR OTHER PREPROCEDURAL EXAMINATION: ICD-10-CM

## 2021-07-06 DIAGNOSIS — I05.0 RHEUMATIC MITRAL STENOSIS: ICD-10-CM

## 2021-07-06 PROBLEM — Z83.3 FAMILY HISTORY OF DIABETES MELLITUS: Status: ACTIVE | Noted: 2021-07-06

## 2021-07-06 PROBLEM — Z82.49 FAMILY HISTORY OF MYOCARDIAL INFARCTION: Status: ACTIVE | Noted: 2021-07-06

## 2021-07-06 PROCEDURE — 93306 TTE W/DOPPLER COMPLETE: CPT | Mod: 26

## 2021-07-06 NOTE — REVIEW OF SYSTEMS
[Weight Loss (___ Lbs)] : [unfilled] ~Ulb weight loss [Heartburn] : heartburn [Abdominal Pain] : abdominal pain [Dysphagia] : dysphagia [Negative] : Heme/Lymph

## 2021-07-09 NOTE — ADDENDUM
[FreeTextEntry1] : Transthoracic Echo shows no significant abnormalities. No cardiac contraindication to proceeding to surgery with general anesthesia.

## 2021-07-09 NOTE — ASSESSMENT
[FreeTextEntry1] : 49 yo M with Esophageal Dysfunction pending Esophagectomy presenting for cardiac optimization\par \par --Will obtain TTE prior to surgery. Functional status is overall excellent, chest/ abdominal discomfort is likely gastrointestinal in origin, however would prefer TTE prior to surgery

## 2021-07-09 NOTE — REASON FOR VISIT
[FreeTextEntry1] : Mr. Tompkins is a 51 yo M with PMH of esophageal dysfunction s/p multiple procedures presenting for cardiac optimization prior to esophagectomy. He reports being able to work as a  without any chest pain, shortness of breath, syncope or palpitations. In addition, he occasionally plays basketball without any symptoms. He is well aware of his heartburn symptoms which he describes as significant burning pain in his upper abdomen and mid chest.

## 2021-07-12 ENCOUNTER — APPOINTMENT (OUTPATIENT)
Dept: DISASTER EMERGENCY | Facility: CLINIC | Age: 51
End: 2021-07-12

## 2021-07-16 ENCOUNTER — APPOINTMENT (OUTPATIENT)
Dept: PULMONOLOGY | Facility: CLINIC | Age: 51
End: 2021-07-16
Payer: MEDICAID

## 2021-07-16 PROCEDURE — 94010 BREATHING CAPACITY TEST: CPT

## 2021-07-16 PROCEDURE — 94726 PLETHYSMOGRAPHY LUNG VOLUMES: CPT

## 2021-07-16 PROCEDURE — 94729 DIFFUSING CAPACITY: CPT

## 2021-07-22 ENCOUNTER — OUTPATIENT (OUTPATIENT)
Dept: OUTPATIENT SERVICES | Facility: HOSPITAL | Age: 51
LOS: 1 days | End: 2021-07-22

## 2021-07-22 VITALS
HEIGHT: 70.5 IN | OXYGEN SATURATION: 99 % | DIASTOLIC BLOOD PRESSURE: 70 MMHG | SYSTOLIC BLOOD PRESSURE: 100 MMHG | HEART RATE: 77 BPM | TEMPERATURE: 98 F | WEIGHT: 151.9 LBS | RESPIRATION RATE: 16 BRPM

## 2021-07-22 DIAGNOSIS — K22.0 ACHALASIA OF CARDIA: ICD-10-CM

## 2021-07-22 DIAGNOSIS — Z98.890 OTHER SPECIFIED POSTPROCEDURAL STATES: Chronic | ICD-10-CM

## 2021-07-22 DIAGNOSIS — S49.90XA UNSPECIFIED INJURY OF SHOULDER AND UPPER ARM, UNSPECIFIED ARM, INITIAL ENCOUNTER: Chronic | ICD-10-CM

## 2021-07-22 LAB
ANION GAP SERPL CALC-SCNC: 14 MMOL/L — SIGNIFICANT CHANGE UP (ref 7–14)
BLD GP AB SCN SERPL QL: NEGATIVE — SIGNIFICANT CHANGE UP
BUN SERPL-MCNC: 10 MG/DL — SIGNIFICANT CHANGE UP (ref 7–23)
CALCIUM SERPL-MCNC: 9.6 MG/DL — SIGNIFICANT CHANGE UP (ref 8.4–10.5)
CHLORIDE SERPL-SCNC: 105 MMOL/L — SIGNIFICANT CHANGE UP (ref 98–107)
CO2 SERPL-SCNC: 22 MMOL/L — SIGNIFICANT CHANGE UP (ref 22–31)
CREAT SERPL-MCNC: 1.16 MG/DL — SIGNIFICANT CHANGE UP (ref 0.5–1.3)
GLUCOSE SERPL-MCNC: 80 MG/DL — SIGNIFICANT CHANGE UP (ref 70–99)
HCT VFR BLD CALC: 44.4 % — SIGNIFICANT CHANGE UP (ref 39–50)
HGB BLD-MCNC: 13.8 G/DL — SIGNIFICANT CHANGE UP (ref 13–17)
MCHC RBC-ENTMCNC: 28.9 PG — SIGNIFICANT CHANGE UP (ref 27–34)
MCHC RBC-ENTMCNC: 31.1 GM/DL — LOW (ref 32–36)
MCV RBC AUTO: 93.1 FL — SIGNIFICANT CHANGE UP (ref 80–100)
NRBC # BLD: 0 /100 WBCS — SIGNIFICANT CHANGE UP
NRBC # FLD: 0 K/UL — SIGNIFICANT CHANGE UP
PLATELET # BLD AUTO: 211 K/UL — SIGNIFICANT CHANGE UP (ref 150–400)
POTASSIUM SERPL-MCNC: 4.2 MMOL/L — SIGNIFICANT CHANGE UP (ref 3.5–5.3)
POTASSIUM SERPL-SCNC: 4.2 MMOL/L — SIGNIFICANT CHANGE UP (ref 3.5–5.3)
RBC # BLD: 4.77 M/UL — SIGNIFICANT CHANGE UP (ref 4.2–5.8)
RBC # FLD: 12.4 % — SIGNIFICANT CHANGE UP (ref 10.3–14.5)
RH IG SCN BLD-IMP: POSITIVE — SIGNIFICANT CHANGE UP
SODIUM SERPL-SCNC: 141 MMOL/L — SIGNIFICANT CHANGE UP (ref 135–145)
WBC # BLD: 14.67 K/UL — HIGH (ref 3.8–10.5)
WBC # FLD AUTO: 14.67 K/UL — HIGH (ref 3.8–10.5)

## 2021-07-22 RX ORDER — SODIUM CHLORIDE 9 MG/ML
1000 INJECTION, SOLUTION INTRAVENOUS
Refills: 0 | Status: DISCONTINUED | OUTPATIENT
Start: 2021-08-03 | End: 2021-08-04

## 2021-07-22 NOTE — H&P PST ADULT - NEGATIVE GENERAL GENITOURINARY SYMPTOMS
no hematuria/no renal colic/no flank pain L/no flank pain R/no urine discoloration/no dysuria/normal urinary frequency

## 2021-07-22 NOTE — H&P PST ADULT - NSICDXPROBLEM_GEN_ALL_CORE_FT
PROBLEM DIAGNOSES  Problem: Achalasia of cardia  Assessment and Plan: Patient tentatively scheduled for upper endoscopy robotic assisted right video assisted laparoscopic neck incision, three holes esophagectomy, JJ tube placement  on 8/2/21.  Pre-op instructions provided. Pt given verbal and written instructions with teach back on chlorhexidine shampoo and pepcid. Pt verbalized understanding with return demonstration.   Covid testing scheduled prior to surgery as per patient.   Patient obtained  Cardiac eval as per surgeon request-copy in chart.  Recent echo results in chart.        PROBLEM DIAGNOSES  Problem: Achalasia of cardia  Assessment and Plan: Patient tentatively scheduled for upper endoscopy robotic assisted right video assisted laparoscopic neck incision, three holes esophagectomy, J tube placement  on 8/2/21.  Pre-op instructions provided. Pt given verbal and written instructions with teach back on chlorhexidine shampoo and pepcid. Pt verbalized understanding with return demonstration.   Covid testing scheduled prior to surgery as per patient.   Patient obtained  Cardiac eval as per surgeon request-copy in chart.  Recent echo results in chart.

## 2021-07-22 NOTE — H&P PST ADULT - ATTENDING COMMENTS
patient with achalasia - per notes prior jair (no records at [facility] per medical records release office), discussed with patient surgical options given his 100% absent peristalsis and failed myotomy and dilations.   discussed proceeding with 3 hole esophagogastrectomy, including alternatives, risks and benefits.   discussed risks of surgery in detail with patient/girlfriend and father on day of surgery - including and not limited to bleeding, infection, scarring, injury to surrounding structures, anastomotic leak/failure of conduit, need to remain NPO until anastomosis heals, any delay in healing will prolong NPO status indefinitely, jejunostomy tube leak, pain, postoperative pneumonia, blood clots.   patient understood and given opportunity day prior to surgery in office and day of surgery to ask all questions - all questions answered. patient agreeable to proceed.

## 2021-07-22 NOTE — H&P PST ADULT - EYES
The Sw spoke to Jennifer(charge nurse at [facility] ED)and she asked the Sw to assist with the placement of this pt's who's currently PEC'd in their ED. The Sw spoke to Zabrina([phone])who states she has been faxing the pt's info all over but the pt has been denied al a few and there are no beds at the others. The Sw called [facility]([phone])spoke to Katherin who states they have no beds no that unit but asked the Sw to fax the info to [phone] and they will review it in case a bed becomes available. The Sw called [facility] [phone] spoke to Domonique who states they denied the pt yesterday due to acuity on the unit but states she will revisit the pt's info with the team again today. The Sw called [facility] [phone] spoke to Samson who asked the Sw to send the info but they currently have no beds.The Sw spoke to Juilana at [facility] [phone] who states they have no beds but asked the Sw to fax the info to her at [phone] and the info can be reviewed in case a bed becomes available.The Sw called [facility] [phone] spoke to Lauren who states they are at capacity and have no beds. The Sw called [facility] [phone] spoke to Edwrad who states they have no beds but asked the Sw to fax the info b/c the dr is on the unit currently.The Sw called [facility] [phone] spoke to Abeba who states they are full and will not have beds until next week some time probably. The Sw called [facility] [phone] spoke to Janett and she states they do accept pediatric PEC/CEC pt's but at this time that unit is under construction so they can't accept that population until the construction is complete. The Sw spoke to Zabrina and asked her to fax the info to the above mentioned facilities and she states she will. The Sw also spoke to Jennifer and informed her of the above mentioned info and she thanked the Sw for her assistance.     EOMI; PERRL; no drainage or redness

## 2021-07-22 NOTE — H&P PST ADULT - NEGATIVE ENMT SYMPTOMS
no hearing difficulty/no ear pain/no tinnitus/no vertigo/no sinus symptoms/no nasal congestion/no throat pain

## 2021-07-22 NOTE — H&P PST ADULT - NEGATIVE NEUROLOGICAL SYMPTOMS
no transient paralysis/no weakness/no paresthesias/no generalized seizures/no syncope/no difficulty walking

## 2021-07-22 NOTE — H&P PST ADULT - HISTORY OF PRESENT ILLNESS
50 year old male with  PMH of esophageal dysfunction  presents to Presurgical testing with diagnosis of achalasia of cardia scheduled for upper endoscopy robotic assisted right video assisted laparoscopic neck incision, three holes esophagectomy . Patient with reports that he always feels that his food is stuck in his chest and not going down. States that he had several endoscopic procedures but no relief  50 year old male with  PMH of esophageal dysfunction  presents to Presurgical testing with diagnosis of achalasia of cardia scheduled for upper endoscopy robotic assisted right video assisted laparoscopic neck incision, three holes esophagectomy , J tube placement  Patient with reports that he always feels that his food is stuck in his chest and not going down. States that he had several endoscopic procedures but no relief

## 2021-07-27 ENCOUNTER — RESULT REVIEW (OUTPATIENT)
Age: 51
End: 2021-07-27

## 2021-07-30 ENCOUNTER — OUTPATIENT (OUTPATIENT)
Dept: OUTPATIENT SERVICES | Facility: HOSPITAL | Age: 51
LOS: 1 days | End: 2021-07-30
Payer: MEDICAID

## 2021-07-30 ENCOUNTER — APPOINTMENT (OUTPATIENT)
Dept: CT IMAGING | Facility: IMAGING CENTER | Age: 51
End: 2021-07-30
Payer: MEDICAID

## 2021-07-30 DIAGNOSIS — K22.0 ACHALASIA OF CARDIA: ICD-10-CM

## 2021-07-30 DIAGNOSIS — Z01.818 ENCOUNTER FOR OTHER PREPROCEDURAL EXAMINATION: ICD-10-CM

## 2021-07-30 DIAGNOSIS — Z98.890 OTHER SPECIFIED POSTPROCEDURAL STATES: Chronic | ICD-10-CM

## 2021-07-30 DIAGNOSIS — S49.90XA UNSPECIFIED INJURY OF SHOULDER AND UPPER ARM, UNSPECIFIED ARM, INITIAL ENCOUNTER: Chronic | ICD-10-CM

## 2021-07-30 PROCEDURE — 71250 CT THORAX DX C-: CPT | Mod: 26

## 2021-07-30 PROCEDURE — 74150 CT ABDOMEN W/O CONTRAST: CPT | Mod: 26

## 2021-07-30 PROCEDURE — 71250 CT THORAX DX C-: CPT

## 2021-07-30 PROCEDURE — 74150 CT ABDOMEN W/O CONTRAST: CPT

## 2021-07-31 ENCOUNTER — APPOINTMENT (OUTPATIENT)
Dept: DISASTER EMERGENCY | Facility: CLINIC | Age: 51
End: 2021-07-31

## 2021-08-01 LAB — SARS-COV-2 N GENE NPH QL NAA+PROBE: NOT DETECTED

## 2021-08-02 ENCOUNTER — APPOINTMENT (OUTPATIENT)
Dept: THORACIC SURGERY | Facility: CLINIC | Age: 51
End: 2021-08-02
Payer: MEDICAID

## 2021-08-02 VITALS
RESPIRATION RATE: 16 BRPM | OXYGEN SATURATION: 98 % | SYSTOLIC BLOOD PRESSURE: 115 MMHG | DIASTOLIC BLOOD PRESSURE: 72 MMHG | HEART RATE: 71 BPM

## 2021-08-02 PROCEDURE — 99215 OFFICE O/P EST HI 40 MIN: CPT | Mod: 57

## 2021-08-02 RX ORDER — HEPARIN SODIUM 5000 [USP'U]/ML
5000 INJECTION INTRAVENOUS; SUBCUTANEOUS ONCE
Refills: 0 | Status: COMPLETED | OUTPATIENT
Start: 2021-08-03 | End: 2021-08-03

## 2021-08-02 NOTE — HISTORY OF PRESENT ILLNESS
[FreeTextEntry1] : Mr. PRATEEK THOMPSON, 50 year old male, former smoker, w/ hx of achalasia s/p Laparoscopy myotomy in ~2001, who presented to GI Dr. Lazaro c/o wt loss of 30 lbs within the past 2-3 months, +N/V, food regurgitation (solids and liquids), and 10/10 acid reflux.\par \par Barium esophagram on 3/8/21: dilated debris-filled esophagus c/w known achalasia. \par \par EGD on 5/24/21 showed moderately dilated lumen of esophagus and food was found in the entire esophagus. GEJ was somewhat tight with a suture present in GEJ from prior fundoplication which appeared to potential displaced. \par \par Manometry on 5/24/21 showed incomplete LES relaxation and 100% absent peristalsis on all swallows\par \par PFTs on 7/16/21: %, FEV1 103%, DLCO 79%\par \par CT Chest/A/P with oral contrast on 7/30/21:\par - 2 mm nodule with adjacent ggo density in LLL, likely post-inflammatory\par - trace Lt apical pleural thickening\par - diffuse marked dilation of the esophagus up to 6.7 cm, distended with retained food matter, oral contrast and gas with abrupt transition to the level of GE junction\par - two surgical clips adjacent to GE junction\par - 2 mm nonobstructing stone in the mid to lower pole of Lt kidney\par - achalasia\par \par Pt is scheduled for EGD, laparoscopic, robotic-assisted, Rt VATS, neck incision, three holes esophagectomy, J-tube placement on 8/3/21. \par \par Pt presents today to discuss surgery. \par

## 2021-08-02 NOTE — CONSULT LETTER
[Dear  ___] : Dear  [unfilled], [Consult Letter:] : I had the pleasure of evaluating your patient, [unfilled]. [( Thank you for referring [unfilled] for consultation for _____ )] : Thank you for referring [unfilled] for consultation for [unfilled] [Please see my note below.] : Please see my note below. [Consult Closing:] : Thank you very much for allowing me to participate in the care of this patient.  If you have any questions, please do not hesitate to contact me. [Sincerely,] : Sincerely, [FreeTextEntry2] : Dr. Cristhian Lazaro (GI/ref)  [FreeTextEntry3] : Anh Goodman MD\par Attending Surgeon\par Division of Thoracic Surgery\par , [facility] [employer] [facility]\par

## 2021-08-03 ENCOUNTER — INPATIENT (INPATIENT)
Facility: HOSPITAL | Age: 51
LOS: 14 days | Discharge: HOME HEALTH | End: 2021-08-18
Attending: STUDENT IN AN ORGANIZED HEALTH CARE EDUCATION/TRAINING PROGRAM | Admitting: STUDENT IN AN ORGANIZED HEALTH CARE EDUCATION/TRAINING PROGRAM
Payer: MEDICAID

## 2021-08-03 ENCOUNTER — RESULT REVIEW (OUTPATIENT)
Age: 51
End: 2021-08-03

## 2021-08-03 ENCOUNTER — APPOINTMENT (OUTPATIENT)
Dept: THORACIC SURGERY | Facility: HOSPITAL | Age: 51
End: 2021-08-03

## 2021-08-03 ENCOUNTER — TRANSCRIPTION ENCOUNTER (OUTPATIENT)
Age: 51
End: 2021-08-03

## 2021-08-03 VITALS
HEIGHT: 70 IN | DIASTOLIC BLOOD PRESSURE: 65 MMHG | SYSTOLIC BLOOD PRESSURE: 96 MMHG | HEART RATE: 611 BPM | OXYGEN SATURATION: 97 % | RESPIRATION RATE: 16 BRPM | TEMPERATURE: 99 F | WEIGHT: 149.91 LBS

## 2021-08-03 DIAGNOSIS — Z98.890 OTHER SPECIFIED POSTPROCEDURAL STATES: Chronic | ICD-10-CM

## 2021-08-03 DIAGNOSIS — S49.90XA UNSPECIFIED INJURY OF SHOULDER AND UPPER ARM, UNSPECIFIED ARM, INITIAL ENCOUNTER: Chronic | ICD-10-CM

## 2021-08-03 DIAGNOSIS — K22.0 ACHALASIA OF CARDIA: ICD-10-CM

## 2021-08-03 PROCEDURE — 88312 SPECIAL STAINS GROUP 1: CPT | Mod: 26

## 2021-08-03 PROCEDURE — 88309 TISSUE EXAM BY PATHOLOGIST: CPT | Mod: 26

## 2021-08-03 PROCEDURE — 88304 TISSUE EXAM BY PATHOLOGIST: CPT | Mod: 26

## 2021-08-03 PROCEDURE — 43800 PYLOROPLASTY: CPT

## 2021-08-03 PROCEDURE — 43200 ESOPHAGOSCOPY FLEXIBLE BRUSH: CPT | Mod: 59

## 2021-08-03 PROCEDURE — S2900 ROBOTIC SURGICAL SYSTEM: CPT | Mod: NC

## 2021-08-03 PROCEDURE — 44300 OPEN BOWEL TO SKIN: CPT | Mod: 59

## 2021-08-03 PROCEDURE — 43288 ESPHG THRSC MOBLJ: CPT | Mod: 22

## 2021-08-03 PROCEDURE — 32674 THORACOSCOPY LYMPH NODE EXC: CPT | Mod: 22

## 2021-08-03 PROCEDURE — 31624 DX BRONCHOSCOPE/LAVAGE: CPT

## 2021-08-03 PROCEDURE — 31645 BRNCHSC W/THER ASPIR 1ST: CPT

## 2021-08-03 RX ORDER — SODIUM CHLORIDE 9 MG/ML
1000 INJECTION, SOLUTION INTRAVENOUS
Refills: 0 | Status: DISCONTINUED | OUTPATIENT
Start: 2021-08-03 | End: 2021-08-05

## 2021-08-03 RX ORDER — PANTOPRAZOLE SODIUM 20 MG/1
40 TABLET, DELAYED RELEASE ORAL DAILY
Refills: 0 | Status: DISCONTINUED | OUTPATIENT
Start: 2021-08-03 | End: 2021-08-18

## 2021-08-03 RX ORDER — HEPARIN SODIUM 5000 [USP'U]/ML
5000 INJECTION INTRAVENOUS; SUBCUTANEOUS EVERY 8 HOURS
Refills: 0 | Status: DISCONTINUED | OUTPATIENT
Start: 2021-08-03 | End: 2021-08-16

## 2021-08-03 RX ORDER — HYDROMORPHONE HYDROCHLORIDE 2 MG/ML
30 INJECTION INTRAMUSCULAR; INTRAVENOUS; SUBCUTANEOUS
Refills: 0 | Status: DISCONTINUED | OUTPATIENT
Start: 2021-08-03 | End: 2021-08-04

## 2021-08-03 RX ORDER — ONDANSETRON 8 MG/1
4 TABLET, FILM COATED ORAL EVERY 6 HOURS
Refills: 0 | Status: DISCONTINUED | OUTPATIENT
Start: 2021-08-03 | End: 2021-08-04

## 2021-08-03 RX ORDER — NALOXONE HYDROCHLORIDE 4 MG/.1ML
0.1 SPRAY NASAL
Refills: 0 | Status: DISCONTINUED | OUTPATIENT
Start: 2021-08-03 | End: 2021-08-04

## 2021-08-03 NOTE — BRIEF OPERATIVE NOTE - COMMENTS
THIS NOTE WAS DRAFTED WHILE PT REMAINED IN OR AND IS INCOMPLETE - NOTE TO BE UPDATED AND FINALIZED AFTER COMPLETION OF OPERATION.

## 2021-08-03 NOTE — BRIEF OPERATIVE NOTE - OPERATION/FINDINGS
Flexible bronchoscopy. Esophagogastroduodenoscopy.   Robotic assisted Zabrina three hole esophagectomy. Dense adhesion encountered in the peritoneum requiring conversion to laparotomy.   Placement of jejunostomy tube. 50M h/o end-stage achalasia s/p lap heller myotomy who is now s/p flex bronch, R robotic-assisted VATS mobilization of esophagus, robotic-assisted laparoscopic converted to open [2/2 dense adhesions of celiac trunk / lesser curve of stomach to liver], mobilization of gastric conduit, pyloroplasty. Neck incision for completion of proximal anastomosis.  Placement of jejunostomy tube.

## 2021-08-04 LAB
ALBUMIN SERPL ELPH-MCNC: 3 G/DL — LOW (ref 3.3–5)
ALP SERPL-CCNC: 66 U/L — SIGNIFICANT CHANGE UP (ref 40–120)
ALT FLD-CCNC: 199 U/L — HIGH (ref 4–41)
ANION GAP SERPL CALC-SCNC: 13 MMOL/L — SIGNIFICANT CHANGE UP (ref 7–14)
APTT BLD: 25.8 SEC — LOW (ref 27–36.3)
AST SERPL-CCNC: 178 U/L — HIGH (ref 4–40)
BILIRUB SERPL-MCNC: 1 MG/DL — SIGNIFICANT CHANGE UP (ref 0.2–1.2)
BLOOD GAS ARTERIAL COMPREHENSIVE RESULT: SIGNIFICANT CHANGE UP
BLOOD GAS ARTERIAL COMPREHENSIVE RESULT: SIGNIFICANT CHANGE UP
BUN SERPL-MCNC: 10 MG/DL — SIGNIFICANT CHANGE UP (ref 7–23)
CALCIUM SERPL-MCNC: 8.1 MG/DL — LOW (ref 8.4–10.5)
CHLORIDE SERPL-SCNC: 103 MMOL/L — SIGNIFICANT CHANGE UP (ref 98–107)
CO2 SERPL-SCNC: 21 MMOL/L — LOW (ref 22–31)
CREAT SERPL-MCNC: 1.18 MG/DL — SIGNIFICANT CHANGE UP (ref 0.5–1.3)
GLUCOSE SERPL-MCNC: 116 MG/DL — HIGH (ref 70–99)
HCT VFR BLD CALC: 38.3 % — LOW (ref 39–50)
HGB BLD-MCNC: 12.2 G/DL — LOW (ref 13–17)
INR BLD: 1.28 RATIO — HIGH (ref 0.88–1.16)
MAGNESIUM SERPL-MCNC: 1.7 MG/DL — SIGNIFICANT CHANGE UP (ref 1.6–2.6)
MCHC RBC-ENTMCNC: 29.5 PG — SIGNIFICANT CHANGE UP (ref 27–34)
MCHC RBC-ENTMCNC: 31.9 GM/DL — LOW (ref 32–36)
MCV RBC AUTO: 92.7 FL — SIGNIFICANT CHANGE UP (ref 80–100)
NRBC # BLD: 0 /100 WBCS — SIGNIFICANT CHANGE UP
NRBC # FLD: 0 K/UL — SIGNIFICANT CHANGE UP
PHOSPHATE SERPL-MCNC: 3.8 MG/DL — SIGNIFICANT CHANGE UP (ref 2.5–4.5)
PLATELET # BLD AUTO: 178 K/UL — SIGNIFICANT CHANGE UP (ref 150–400)
POTASSIUM SERPL-MCNC: 4.2 MMOL/L — SIGNIFICANT CHANGE UP (ref 3.5–5.3)
POTASSIUM SERPL-SCNC: 4.2 MMOL/L — SIGNIFICANT CHANGE UP (ref 3.5–5.3)
PROT SERPL-MCNC: 5.2 G/DL — LOW (ref 6–8.3)
PROTHROM AB SERPL-ACNC: 14.5 SEC — HIGH (ref 10.6–13.6)
RBC # BLD: 4.13 M/UL — LOW (ref 4.2–5.8)
RBC # FLD: 12.6 % — SIGNIFICANT CHANGE UP (ref 10.3–14.5)
SODIUM SERPL-SCNC: 137 MMOL/L — SIGNIFICANT CHANGE UP (ref 135–145)
WBC # BLD: 20.16 K/UL — HIGH (ref 3.8–10.5)
WBC # FLD AUTO: 20.16 K/UL — HIGH (ref 3.8–10.5)

## 2021-08-04 PROCEDURE — 71045 X-RAY EXAM CHEST 1 VIEW: CPT | Mod: 26

## 2021-08-04 PROCEDURE — 31624 DX BRONCHOSCOPE/LAVAGE: CPT

## 2021-08-04 PROCEDURE — 31645 BRNCHSC W/THER ASPIR 1ST: CPT

## 2021-08-04 PROCEDURE — 99291 CRITICAL CARE FIRST HOUR: CPT

## 2021-08-04 RX ORDER — MAGNESIUM SULFATE 500 MG/ML
2 VIAL (ML) INJECTION ONCE
Refills: 0 | Status: DISCONTINUED | OUTPATIENT
Start: 2021-08-04 | End: 2021-08-04

## 2021-08-04 RX ORDER — DEXMEDETOMIDINE HYDROCHLORIDE IN 0.9% SODIUM CHLORIDE 4 UG/ML
0.4 INJECTION INTRAVENOUS
Qty: 400 | Refills: 0 | Status: DISCONTINUED | OUTPATIENT
Start: 2021-08-04 | End: 2021-08-04

## 2021-08-04 RX ORDER — NALOXONE HYDROCHLORIDE 4 MG/.1ML
0.1 SPRAY NASAL
Refills: 0 | Status: DISCONTINUED | OUTPATIENT
Start: 2021-08-04 | End: 2021-08-18

## 2021-08-04 RX ORDER — ALBUMIN HUMAN 25 %
250 VIAL (ML) INTRAVENOUS ONCE
Refills: 0 | Status: COMPLETED | OUTPATIENT
Start: 2021-08-04 | End: 2021-08-04

## 2021-08-04 RX ORDER — ACETAMINOPHEN 500 MG
1000 TABLET ORAL ONCE
Refills: 0 | Status: COMPLETED | OUTPATIENT
Start: 2021-08-04 | End: 2021-08-04

## 2021-08-04 RX ORDER — ENOXAPARIN SODIUM 100 MG/ML
40 INJECTION SUBCUTANEOUS
Qty: 30 | Refills: 0
Start: 2021-08-04 | End: 2021-09-02

## 2021-08-04 RX ORDER — MAGNESIUM SULFATE 500 MG/ML
2 VIAL (ML) INJECTION ONCE
Refills: 0 | Status: COMPLETED | OUTPATIENT
Start: 2021-08-04 | End: 2021-08-04

## 2021-08-04 RX ORDER — PHENYLEPHRINE HYDROCHLORIDE 10 MG/ML
0.5 INJECTION INTRAVENOUS
Qty: 40 | Refills: 0 | Status: DISCONTINUED | OUTPATIENT
Start: 2021-08-04 | End: 2021-08-05

## 2021-08-04 RX ORDER — ONDANSETRON 8 MG/1
4 TABLET, FILM COATED ORAL EVERY 6 HOURS
Refills: 0 | Status: DISCONTINUED | OUTPATIENT
Start: 2021-08-04 | End: 2021-08-18

## 2021-08-04 RX ORDER — HYDROMORPHONE HYDROCHLORIDE 2 MG/ML
0.5 INJECTION INTRAMUSCULAR; INTRAVENOUS; SUBCUTANEOUS
Refills: 0 | Status: DISCONTINUED | OUTPATIENT
Start: 2021-08-04 | End: 2021-08-10

## 2021-08-04 RX ORDER — HYDROMORPHONE HYDROCHLORIDE 2 MG/ML
30 INJECTION INTRAMUSCULAR; INTRAVENOUS; SUBCUTANEOUS
Refills: 0 | Status: DISCONTINUED | OUTPATIENT
Start: 2021-08-04 | End: 2021-08-10

## 2021-08-04 RX ORDER — FUROSEMIDE 40 MG
10 TABLET ORAL ONCE
Refills: 0 | Status: COMPLETED | OUTPATIENT
Start: 2021-08-04 | End: 2021-08-04

## 2021-08-04 RX ORDER — PROPOFOL 10 MG/ML
50 INJECTION, EMULSION INTRAVENOUS
Qty: 1000 | Refills: 0 | Status: DISCONTINUED | OUTPATIENT
Start: 2021-08-04 | End: 2021-08-04

## 2021-08-04 RX ORDER — CHLORHEXIDINE GLUCONATE 213 G/1000ML
15 SOLUTION TOPICAL EVERY 12 HOURS
Refills: 0 | Status: DISCONTINUED | OUTPATIENT
Start: 2021-08-04 | End: 2021-08-05

## 2021-08-04 RX ADMIN — Medication 1000 MILLIGRAM(S): at 15:15

## 2021-08-04 RX ADMIN — SODIUM CHLORIDE 100 MILLILITER(S): 9 INJECTION, SOLUTION INTRAVENOUS at 01:20

## 2021-08-04 RX ADMIN — Medication 1000 MILLIGRAM(S): at 02:30

## 2021-08-04 RX ADMIN — CHLORHEXIDINE GLUCONATE 15 MILLILITER(S): 213 SOLUTION TOPICAL at 20:03

## 2021-08-04 RX ADMIN — Medication 500 MILLILITER(S): at 04:30

## 2021-08-04 RX ADMIN — PROPOFOL 20.4 MICROGRAM(S)/KG/MIN: 10 INJECTION, EMULSION INTRAVENOUS at 08:00

## 2021-08-04 RX ADMIN — Medication 400 MILLIGRAM(S): at 15:00

## 2021-08-04 RX ADMIN — HEPARIN SODIUM 5000 UNIT(S): 5000 INJECTION INTRAVENOUS; SUBCUTANEOUS at 06:46

## 2021-08-04 RX ADMIN — Medication 125 MILLILITER(S): at 16:19

## 2021-08-04 RX ADMIN — SODIUM CHLORIDE 100 MILLILITER(S): 9 INJECTION, SOLUTION INTRAVENOUS at 20:04

## 2021-08-04 RX ADMIN — Medication 50 GRAM(S): at 06:46

## 2021-08-04 RX ADMIN — HEPARIN SODIUM 5000 UNIT(S): 5000 INJECTION INTRAVENOUS; SUBCUTANEOUS at 13:05

## 2021-08-04 RX ADMIN — DEXMEDETOMIDINE HYDROCHLORIDE IN 0.9% SODIUM CHLORIDE 6.8 MICROGRAM(S)/KG/HR: 4 INJECTION INTRAVENOUS at 02:49

## 2021-08-04 RX ADMIN — Medication 400 MILLIGRAM(S): at 02:00

## 2021-08-04 RX ADMIN — DEXMEDETOMIDINE HYDROCHLORIDE IN 0.9% SODIUM CHLORIDE 6.8 MICROGRAM(S)/KG/HR: 4 INJECTION INTRAVENOUS at 08:00

## 2021-08-04 RX ADMIN — HEPARIN SODIUM 5000 UNIT(S): 5000 INJECTION INTRAVENOUS; SUBCUTANEOUS at 21:07

## 2021-08-04 RX ADMIN — CHLORHEXIDINE GLUCONATE 15 MILLILITER(S): 213 SOLUTION TOPICAL at 06:46

## 2021-08-04 RX ADMIN — Medication 250 MILLILITER(S): at 20:35

## 2021-08-04 RX ADMIN — Medication 10 MILLIGRAM(S): at 20:05

## 2021-08-04 RX ADMIN — PANTOPRAZOLE SODIUM 40 MILLIGRAM(S): 20 TABLET, DELAYED RELEASE ORAL at 13:04

## 2021-08-04 RX ADMIN — HEPARIN SODIUM 5000 UNIT(S): 5000 INJECTION INTRAVENOUS; SUBCUTANEOUS at 01:47

## 2021-08-04 RX ADMIN — PROPOFOL 20.4 MICROGRAM(S)/KG/MIN: 10 INJECTION, EMULSION INTRAVENOUS at 02:49

## 2021-08-04 RX ADMIN — Medication 250 MILLILITER(S): at 19:20

## 2021-08-04 RX ADMIN — Medication 125 MILLILITER(S): at 15:30

## 2021-08-04 NOTE — DIETITIAN INITIAL EVALUATION ADULT. - OTHER INFO
51 y/o male with dx achalasia of esophagus, now s/p esophagectomy 8/3 with J-tube placement. Unable to interview pt as he is intubated and sedated on precedex and propofol. Propofol providing ~539 kcal/day as ordered and ~114 kcal over the past 24 hrs. NGT to LWCS. Receiving IVF of lactated ringers and IV piggyback. Possible plan to extubate later today and initiate TF. Nutrition consult generated for TF recommendations. Suggest the provision of Jevity 1.2, starting at 10 ml/hr, and increase by 10 ml, every 6 hrs, to goal rate of 60 ml/hr continuously over 24 hrs. Feeding would provide 1728 kcal, 80 gm protein, 1162 ml free H2O in a total volume of 1440 ml/day. No GI distress presently. No edema nor pressure injuries noted. Review of [facility]IE records revealed pt with significant weight loss over 6 weeks PTA. Wt 6/14 78 kg with dosing wt this admission 68 kg indicating a 12.8% weight loss during this time. It is suspected that given pt's report of "food getting stuck in his throat," oral intake was likely <75% of estimated nutrition needs during this time frame. This presents pt at severe risk for malnutrition. Once enteral feeding is initiated, monitor tolerance towards feeding and advance as tolerated to goal rate. RDN services to remain available as needed.

## 2021-08-04 NOTE — CONSULT NOTE ADULT - SUBJECTIVE AND OBJECTIVE BOX
ENT CONSULT NOTE    HPI: 50yMale s/p Flex bronch, R robotic-assisted VATS mobilization of esophagus, robotic-assisted laparoscopic converted to open [2/2 dense adhesions of celiac trunk / lesser curve of stomach to liver], mobilization of gastric conduit, pyloroplasty. Neck incision for completion of proximal anastomosis.  Placement of jejunostomy tube 7/3/2021. Remained intubated and on vent support.     ENT consulted because of hoarseness after the procedure - concern for vocal fold dysfunction. Pt was breathy during taking history - mild stridor on deep respiration. Otherwise stable throughout the interview with no significant desaturations. Pt admits he has had voice changes after the surgery.     PAST MEDICAL & SURGICAL HISTORY:  Achalasia    Shoulder injury  left    History of esophagogastroduodenoscopy (EGD)      No Known Allergies    MEDICATIONS  (STANDING):  chlorhexidine 0.12% Liquid 15 milliLiter(s) Oral Mucosa every 12 hours  dexMEDEtomidine Infusion 0.4 MICROgram(s)/kG/Hr (6.8 mL/Hr) IV Continuous <Continuous>  heparin   Injectable 5000 Unit(s) SubCutaneous once  heparin   Injectable 5000 Unit(s) SubCutaneous every 8 hours  HYDROmorphone PCA (1 mG/mL) 30 milliLiter(s) PCA Continuous PCA Continuous  lactated ringers. 1000 milliLiter(s) (100 mL/Hr) IV Continuous <Continuous>  pantoprazole  Injectable 40 milliGRAM(s) IV Push daily  propofol Infusion 50 MICROgram(s)/kG/Min (20.4 mL/Hr) IV Continuous <Continuous>    MEDICATIONS  (PRN):  HYDROmorphone PCA (1 mG/mL) Rescue Clinician Bolus 0.5 milliGRAM(s) IV Push every 15 minutes PRN for Pain Scale GREATER THAN 6  naloxone Injectable 0.1 milliGRAM(s) IV Push every 3 minutes PRN For ANY of the following changes in patient status:  A. RR LESS THAN 10 breaths per minute, B. Oxygen saturation LESS THAN 90%, C. Sedation score of 6  ondansetron Injectable 4 milliGRAM(s) IV Push every 6 hours PRN Nausea      Objective    ICU Vital Signs Last 24 Hrs  T(C): 35.6 (04 Aug 2021 16:00), Max: 38 (04 Aug 2021 01:15)  T(F): 96 (04 Aug 2021 16:00), Max: 100.4 (04 Aug 2021 01:15)  HR: 54 (04 Aug 2021 19:00) (46 - 73)  BP: 104/66 (04 Aug 2021 03:00) (104/66 - 130/75)  BP(mean): 78 (04 Aug 2021 03:00) (78 - 89)  ABP: 98/43 (04 Aug 2021 19:00) (97/44 - 176/82)  ABP(mean): 61 (04 Aug 2021 19:00) (61 - 118)  RR: 14 (04 Aug 2021 19:00) (13 - 18)  SpO2: 98% (04 Aug 2021 19:00) (98% - 100%)      PHYSICAL EXAM:    CONSTITUTIONAL: Well nourished, well developed, NON-DYSMORPHIC, and in no acute distress.    NOSE: NGt in place, Normal external nose. Anterior nasal cavity patent with no obstruction. Inferior turbinates normally sized.  ORAL CAVITY/OROPHARYNX: normal mucosa. No erythema, lesions or bleeding.  RESPIRATORY: Respirations unlabored, no increased work of breathing with use of accessory muscles and retractions. No stridor.  CARDIAC: Warm extremities, no cyanosis.     Procedure: Flexible laryngoscopy    Pre-procedure diagnosis/Indication for procedure: To evaluate larynx    Description:    A flexible endoscope was used to examine the left and right nasal cavities, posterior oropharynx, hypopharynx, and larynx. The nasal valve areas were examined for abnormalities or collapse. The inferior and middle turbinates were evaluated. The middle and superior meatuses, the sphenoethmoid recesses, and the nasopharynx were examined and inspected for mucopurulence, polyps, and nasal masses. The oropharynx, tongue base/vallecula, epiglottis, aryepiglottic folds, arytenoids, vocal cords, hypopharynx were also inspected for swelling, inflammation, or dysfunction. The patient tolerated the procedure without complications.    Nasopharynx wnl  BOT/vallecula normal  Epiglottis sharp  AE folds nonedematous  R vocal fold paresis vs. paralyzed, L vocal fold difficult to evaluate with NGt in place, but arytenoids appear mobile. Glottic gap.  No masses or lesions visualized in post cricoid space or pyriform sinuses bilaterally  No active bleeding or significant obstruction noted in supraglottic area.                         12.2   20.16 )-----------( 178      ( 04 Aug 2021 01:44 )             38.3     08-04    137  |  103  |  10  ----------------------------<  116<H>  4.2   |  21<L>  |  1.18    Ca    8.1<L>      04 Aug 2021 01:44  Phos  3.8     08-04  Mg     1.70     08-04    TPro  5.2<L>  /  Alb  3.0<L>  /  TBili  1.0  /  DBili  x   /  AST  178<H>  /  ALT  199<H>  /  AlkPhos  66  08-04      I&O's Summary    03 Aug 2021 07:01  -  04 Aug 2021 07:00  --------------------------------------------------------  IN: 1307 mL / OUT: 1295 mL / NET: 12 mL    04 Aug 2021 07:01  -  04 Aug 2021 20:40  --------------------------------------------------------  IN: 2366 mL / OUT: 2010 mL / NET: 356 mL

## 2021-08-04 NOTE — DIETITIAN INITIAL EVALUATION ADULT. - ADD RECOMMEND
1). Monitor weights, labs, BM's, skin integrity and tolerance towards enteral feeding. 2). Follow pt as per protocol.

## 2021-08-04 NOTE — PROGRESS NOTE ADULT - SUBJECTIVE AND OBJECTIVE BOX
PRATEEK THOMPSON            MRN-[id number]         No Known Allergies                 HPI:  50 year old male with  PMH of esophageal dysfunction  presents to Presurgical testing with diagnosis of achalasia of cardia scheduled for upper endoscopy robotic assisted right video assisted laparoscopic neck incision, three holes esophagectomy , J tube placement  Patient with reports that he always feels that his food is stuck in his chest and not going down. States that he had several endoscopic procedures but no relief  (22 Jul 2021 10:56)      Procedure:  Flex bronch, R robotic-assisted VATS mobilization of esophagus, robotic-assisted laparoscopic converted to open [2/2 dense adhesions of celiac trunk / lesser curve of stomach to liver], mobilization of gastric conduit, pyloroplasty. Neck incision for completion of proximal anastomosis.  Placement of jejunostomy tube 7/3/2021      Issues:              Respiratory failure on vent support  Achalasia              Postop pain      PAST MEDICAL & SURGICAL HISTORY:  Achalasia    Shoulder injury  left    History of esophagogastroduodenoscopy (EGD)        ICU Vital Signs Last 24 Hrs  T(C): 35.6 (04 Aug 2021 08:00), Max: 38 (04 Aug 2021 01:15)  T(F): 96 (04 Aug 2021 08:00), Max: 100.4 (04 Aug 2021 01:15)  HR: 56 (04 Aug 2021 10:03) (53 - 73)  BP: 104/66 (04 Aug 2021 03:00) (104/66 - 130/75)  BP(mean): 78 (04 Aug 2021 03:00) (78 - 89)  ABP: 120/67 (04 Aug 2021 10:00) (104/54 - 176/82)  ABP(mean): 90 (04 Aug 2021 10:00) (70 - 118)  RR: 14 (04 Aug 2021 10:00) (13 - 15)  SpO2: 100% (04 Aug 2021 10:03) (98% - 100%)    I&O's Detail    03 Aug 2021 07:01  -  04 Aug 2021 07:00  --------------------------------------------------------  IN:    Dexmedetomidine: 93 mL    Enteral Tube Flush: 80 mL    Enteral Tube Flush: 80 mL    IV PiggyBack: 250 mL    Lactated Ringers: 700 mL    Propofol: 104 mL  Total IN: 1307 mL    OUT:    Chest Tube (mL): 100 mL    Chest Tube (mL): 190 mL    Indwelling Catheter - Urethral (mL): 795 mL    Jejunostomy Tube (mL): 10 mL    Nasogastric/Oral tube (mL): 200 mL  Total OUT: 1295 mL    Total NET: 12 mL      04 Aug 2021 07:01  -  04 Aug 2021 11:01  --------------------------------------------------------  IN:    Dexmedetomidine: 51 mL    Enteral Tube Flush: 30 mL    Enteral Tube Flush: 30 mL    IV PiggyBack: 50 mL    Lactated Ringers: 300 mL    Propofol: 36.6 mL  Total IN: 497.6 mL    OUT:    Chest Tube (mL): 0 mL    Chest Tube (mL): 0 mL    Indwelling Catheter - Urethral (mL): 950 mL  Total OUT: 950 mL    Total NET: -452.4 mL        CAPILLARY BLOOD GLUCOSE      Home Medications:  none as per patient:  (22 Jul 2021 11:00)      MEDICATIONS  (STANDING):  chlorhexidine 0.12% Liquid 15 milliLiter(s) Oral Mucosa every 12 hours  dexMEDEtomidine Infusion 0.4 MICROgram(s)/kG/Hr (6.8 mL/Hr) IV Continuous <Continuous>  heparin   Injectable 5000 Unit(s) SubCutaneous once  heparin   Injectable 5000 Unit(s) SubCutaneous every 8 hours  lactated ringers. 1000 milliLiter(s) (100 mL/Hr) IV Continuous <Continuous>  pantoprazole  Injectable 40 milliGRAM(s) IV Push daily  propofol Infusion 50 MICROgram(s)/kG/Min (20.4 mL/Hr) IV Continuous <Continuous>    MEDICATIONS  (PRN):      Mode: AC/ CMV (Assist Control/ Continuous Mandatory Ventilation)  RR (machine): 14  TV (machine): 500  FiO2: 40  PEEP: 5  ITime: 0.8  MAP: 9  PIP: 21      Physical exam:                             General:               Pt is awake, alert, not in any distress                                                  Neuro:                  Nonfocal                             Cardiovascular:   S1 & S2, regular                           Respiratory:         Air entry is fair and equal on both sides, has bilateral conducted sounds                           GI:                          Soft, nondistended and nontender, Bowel sounds absent                            Ext:                        No cyanosis or edema     Labs:                                                                           12.2   20.16 )-----------( 178      ( 04 Aug 2021 01:44 )             38.3             08-04    137  |  103  |  10  ----------------------------<  116<H>  4.2   |  21<L>  |  1.18    Ca    8.1<L>      04 Aug 2021 01:44  Phos  3.8     08-04  Mg     1.70     08-04    TPro  5.2<L>  /  Alb  3.0<L>  /  TBili  1.0  /  DBili  x   /  AST  178<H>  /  ALT  199<H>  /  AlkPhos  66  08-04                  PT/INR - ( 04 Aug 2021 01:44 )   PT: 14.5 sec;   INR: 1.28 ratio         PTT - ( 04 Aug 2021 01:44 )  PTT:25.8 sec  LIVER FUNCTIONS - ( 04 Aug 2021 01:44 )  Alb: 3.0 g/dL / Pro: 5.2 g/dL / ALK PHOS: 66 U/L / ALT: 199 U/L / AST: 178 U/L / GGT: x             CXR:      Plan:    General: 50yMale s/p Flex bronch, R robotic-assisted VATS mobilization of esophagus, robotic-assisted laparoscopic converted to open [2/2 dense adhesions of celiac trunk / lesser curve of stomach to liver], mobilization of gastric conduit, pyloroplasty. Neck incision for completion of proximal anastomosis.  Placement of jejunostomy tube 7/3/2021. Remained intubated and on vent support.                             Neuro:                                          Pain control with Dilaudid IVP / IV Tylenol. Start PCA after extubation                            Cardiovascular:                                           Continue hemodynamic monitoring.                                         Continue IVF LR 100cc/hr                            Respiratory:                                          Pt is on full mechanical vent support - EZ--40-5                                            Bronchoscopy today  to clear airway - Suspicion for aspiration pneumonia      CPAP wean and extubation later today                                          Monitor chest tube output                                          Chest tube to suction                                                                 Continue bronchodilators, pulmonary toilet                            GI                                          NPO                                          Continue GI prophylaxis with  Protonix                                          Continue Zofran / Reglan for nausea - PRN                                          NGT to low continuous wall suction & J-tube to gravity                                          Flush both NGT and J-tube with 20cc of sterile water Q 4hrs.  	                                                                 Renal:                                          Continue LR 100cc/hr                                          Monitor I/Os and electrolytes                                          Kenny                                                  Hem/ Onc:                                                                                   Monitor chest tube output &  signs of bleeding.                                           Follow CBC in AM                           Infectious disease:                                             Monitor for fever / leukocytosis.                                           All surgical incision / chest tube  sites look clean                            Endocrine                                              Continue Accu-Checks with coverage    Pt is on SQ Heparin and Venodyne boots for DVT prophylaxis.     Pertinent clinical, laboratory, radiographic, hemodynamic, echocardiographic, respiratory data, microbiologic data and chart were reviewed and analyzed frequently throughout the course of the day and night  Patient seen, examined and plan discussed with CT Surgeon Dr. Goodman  / CTICU team during rounds.    Status discussed with patient  / family at bedside, updated plan of care    I have spent 40 minutes of critical care time with this patient between 7am and 11.59pm monitoring hemodynamic status, respiratory status, IVF resuscitation , titrating pressors and managing ventilator.           Austin Escobar MD

## 2021-08-04 NOTE — PROCEDURE NOTE - ADDITIONAL PROCEDURE DETAILS
Findings:  Bronchoscope inserted through ETT. ETT noted to be in good position. Airway evaluation revealed [place]. R >L sided yellow-brown sputum. Bronchoscope then withdrawn from ETT. Minimal bleeding noted. No specimen sent.

## 2021-08-04 NOTE — DIETITIAN INITIAL EVALUATION ADULT. - PERTINENT MEDS FT
MEDICATIONS  (STANDING):  chlorhexidine 0.12% Liquid 15 milliLiter(s) Oral Mucosa every 12 hours  dexMEDEtomidine Infusion 0.4 MICROgram(s)/kG/Hr (6.8 mL/Hr) IV Continuous <Continuous>  heparin   Injectable 5000 Unit(s) SubCutaneous once  heparin   Injectable 5000 Unit(s) SubCutaneous every 8 hours  lactated ringers. 1000 milliLiter(s) (100 mL/Hr) IV Continuous <Continuous>  pantoprazole  Injectable 40 milliGRAM(s) IV Push daily  propofol Infusion 50 MICROgram(s)/kG/Min (20.4 mL/Hr) IV Continuous <Continuous>

## 2021-08-05 LAB
ANION GAP SERPL CALC-SCNC: 12 MMOL/L — SIGNIFICANT CHANGE UP (ref 7–14)
BUN SERPL-MCNC: 17 MG/DL — SIGNIFICANT CHANGE UP (ref 7–23)
CALCIUM SERPL-MCNC: 8.3 MG/DL — LOW (ref 8.4–10.5)
CHLORIDE SERPL-SCNC: 103 MMOL/L — SIGNIFICANT CHANGE UP (ref 98–107)
CO2 SERPL-SCNC: 23 MMOL/L — SIGNIFICANT CHANGE UP (ref 22–31)
CREAT SERPL-MCNC: 1.17 MG/DL — SIGNIFICANT CHANGE UP (ref 0.5–1.3)
GLUCOSE SERPL-MCNC: 90 MG/DL — SIGNIFICANT CHANGE UP (ref 70–99)
HCT VFR BLD CALC: 35.5 % — LOW (ref 39–50)
HGB BLD-MCNC: 11.2 G/DL — LOW (ref 13–17)
MAGNESIUM SERPL-MCNC: 2.1 MG/DL — SIGNIFICANT CHANGE UP (ref 1.6–2.6)
MCHC RBC-ENTMCNC: 29.6 PG — SIGNIFICANT CHANGE UP (ref 27–34)
MCHC RBC-ENTMCNC: 31.5 GM/DL — LOW (ref 32–36)
MCV RBC AUTO: 93.9 FL — SIGNIFICANT CHANGE UP (ref 80–100)
NRBC # BLD: 0 /100 WBCS — SIGNIFICANT CHANGE UP
NRBC # FLD: 0 K/UL — SIGNIFICANT CHANGE UP
PHOSPHATE SERPL-MCNC: 3.1 MG/DL — SIGNIFICANT CHANGE UP (ref 2.5–4.5)
PLATELET # BLD AUTO: 151 K/UL — SIGNIFICANT CHANGE UP (ref 150–400)
POTASSIUM SERPL-MCNC: 4.3 MMOL/L — SIGNIFICANT CHANGE UP (ref 3.5–5.3)
POTASSIUM SERPL-SCNC: 4.3 MMOL/L — SIGNIFICANT CHANGE UP (ref 3.5–5.3)
RBC # BLD: 3.78 M/UL — LOW (ref 4.2–5.8)
RBC # FLD: 12.7 % — SIGNIFICANT CHANGE UP (ref 10.3–14.5)
SODIUM SERPL-SCNC: 138 MMOL/L — SIGNIFICANT CHANGE UP (ref 135–145)
WBC # BLD: 17.42 K/UL — HIGH (ref 3.8–10.5)
WBC # FLD AUTO: 17.42 K/UL — HIGH (ref 3.8–10.5)

## 2021-08-05 PROCEDURE — 99233 SBSQ HOSP IP/OBS HIGH 50: CPT

## 2021-08-05 PROCEDURE — 71045 X-RAY EXAM CHEST 1 VIEW: CPT | Mod: 26

## 2021-08-05 RX ORDER — SODIUM CHLORIDE 9 MG/ML
4 INJECTION INTRAMUSCULAR; INTRAVENOUS; SUBCUTANEOUS EVERY 8 HOURS
Refills: 0 | Status: DISCONTINUED | OUTPATIENT
Start: 2021-08-05 | End: 2021-08-06

## 2021-08-05 RX ORDER — DEXTROSE MONOHYDRATE, SODIUM CHLORIDE, AND POTASSIUM CHLORIDE 50; .745; 4.5 G/1000ML; G/1000ML; G/1000ML
1000 INJECTION, SOLUTION INTRAVENOUS
Refills: 0 | Status: DISCONTINUED | OUTPATIENT
Start: 2021-08-05 | End: 2021-08-06

## 2021-08-05 RX ORDER — KETOROLAC TROMETHAMINE 30 MG/ML
15 SYRINGE (ML) INJECTION ONCE
Refills: 0 | Status: DISCONTINUED | OUTPATIENT
Start: 2021-08-05 | End: 2021-08-05

## 2021-08-05 RX ORDER — DORNASE ALFA 1 MG/ML
2.5 SOLUTION RESPIRATORY (INHALATION)
Refills: 0 | Status: DISCONTINUED | OUTPATIENT
Start: 2021-08-05 | End: 2021-08-18

## 2021-08-05 RX ORDER — IPRATROPIUM/ALBUTEROL SULFATE 18-103MCG
3 AEROSOL WITH ADAPTER (GRAM) INHALATION EVERY 6 HOURS
Refills: 0 | Status: DISCONTINUED | OUTPATIENT
Start: 2021-08-05 | End: 2021-08-06

## 2021-08-05 RX ORDER — ACETAMINOPHEN 500 MG
1000 TABLET ORAL ONCE
Refills: 0 | Status: COMPLETED | OUTPATIENT
Start: 2021-08-05 | End: 2021-08-05

## 2021-08-05 RX ORDER — IPRATROPIUM/ALBUTEROL SULFATE 18-103MCG
3 AEROSOL WITH ADAPTER (GRAM) INHALATION EVERY 6 HOURS
Refills: 0 | Status: DISCONTINUED | OUTPATIENT
Start: 2021-08-05 | End: 2021-08-05

## 2021-08-05 RX ADMIN — SODIUM CHLORIDE 4 MILLILITER(S): 9 INJECTION INTRAMUSCULAR; INTRAVENOUS; SUBCUTANEOUS at 13:04

## 2021-08-05 RX ADMIN — Medication 400 MILLIGRAM(S): at 13:37

## 2021-08-05 RX ADMIN — Medication 15 MILLIGRAM(S): at 13:50

## 2021-08-05 RX ADMIN — HYDROMORPHONE HYDROCHLORIDE 0.5 MILLIGRAM(S): 2 INJECTION INTRAMUSCULAR; INTRAVENOUS; SUBCUTANEOUS at 02:33

## 2021-08-05 RX ADMIN — SODIUM CHLORIDE 4 MILLILITER(S): 9 INJECTION INTRAMUSCULAR; INTRAVENOUS; SUBCUTANEOUS at 22:02

## 2021-08-05 RX ADMIN — DORNASE ALFA 2.5 MILLIGRAM(S): 1 SOLUTION RESPIRATORY (INHALATION) at 21:59

## 2021-08-05 RX ADMIN — PANTOPRAZOLE SODIUM 40 MILLIGRAM(S): 20 TABLET, DELAYED RELEASE ORAL at 13:36

## 2021-08-05 RX ADMIN — HEPARIN SODIUM 5000 UNIT(S): 5000 INJECTION INTRAVENOUS; SUBCUTANEOUS at 05:42

## 2021-08-05 RX ADMIN — Medication 3 MILLILITER(S): at 06:56

## 2021-08-05 RX ADMIN — DORNASE ALFA 2.5 MILLIGRAM(S): 1 SOLUTION RESPIRATORY (INHALATION) at 22:35

## 2021-08-05 RX ADMIN — Medication 3 MILLILITER(S): at 12:47

## 2021-08-05 RX ADMIN — HEPARIN SODIUM 5000 UNIT(S): 5000 INJECTION INTRAVENOUS; SUBCUTANEOUS at 15:00

## 2021-08-05 RX ADMIN — Medication 1000 MILLIGRAM(S): at 13:50

## 2021-08-05 RX ADMIN — CHLORHEXIDINE GLUCONATE 15 MILLILITER(S): 213 SOLUTION TOPICAL at 05:42

## 2021-08-05 RX ADMIN — Medication 3 MILLILITER(S): at 21:59

## 2021-08-05 RX ADMIN — Medication 400 MILLIGRAM(S): at 06:50

## 2021-08-05 RX ADMIN — Medication 15 MILLIGRAM(S): at 13:36

## 2021-08-05 RX ADMIN — Medication 1000 MILLIGRAM(S): at 07:00

## 2021-08-05 RX ADMIN — HYDROMORPHONE HYDROCHLORIDE 30 MILLILITER(S): 2 INJECTION INTRAMUSCULAR; INTRAVENOUS; SUBCUTANEOUS at 19:32

## 2021-08-05 RX ADMIN — HEPARIN SODIUM 5000 UNIT(S): 5000 INJECTION INTRAVENOUS; SUBCUTANEOUS at 21:31

## 2021-08-05 NOTE — PROGRESS NOTE ADULT - SUBJECTIVE AND OBJECTIVE BOX
PRATEEK THOMPSON                     [id number]    HPI:  50 year old male with  PMH of esophageal dysfunction  presents to Presurgical testing with diagnosis of achalasia of cardia scheduled for upper endoscopy robotic assisted right video assisted laparoscopic neck incision, three holes esophagectomy , J tube placement  Patient with reports that he always feels that his food is stuck in his chest and not going down. States that he had several endoscopic procedures but no relief  (22 Jul 2021 10:56)    Procedure:  Flex bronch, R robotic-assisted VATS mobilization of esophagus, robotic-assisted laparoscopic converted to open [2/2 dense adhesions of celiac trunk / lesser curve of stomach to liver], mobilization of gastric conduit, pyloroplasty. Neck incision for completion of proximal anastomosis.  Placement of jejunostomy tube 7/3/2021      Issues:              Respiratory failure on vent support  Achalasia              Postop pain    PAST MEDICAL & SURGICAL HISTORY:  Achalasia    Shoulder injury  left    History of esophagogastroduodenoscopy (EGD)              VITAL SIGNS:  Vital Signs Last 24 Hrs  T(C): 37.1 (05 Aug 2021 04:00), Max: 37.1 (04 Aug 2021 20:00)  T(F): 98.8 (05 Aug 2021 04:00), Max: 98.8 (05 Aug 2021 04:00)  HR: 62 (05 Aug 2021 08:00) (46 - 78)  BP: --  BP(mean): --  RR: 14 (05 Aug 2021 08:00) (10 - 22)  SpO2: 93% (05 Aug 2021 08:00) (91% - 100%)    I/Os:   I&O's Detail    04 Aug 2021 07:01  -  05 Aug 2021 07:00  --------------------------------------------------------  IN:    Dexmedetomidine: 119 mL    Enteral Tube Flush: 210 mL    Enteral Tube Flush: 210 mL    IV PiggyBack: 1400 mL    Lactated Ringers: 2550 mL    Propofol: 67 mL  Total IN: 4556 mL    OUT:    Chest Tube (mL): 70 mL    Chest Tube (mL): 260 mL    Indwelling Catheter - Urethral (mL): 2410 mL    Jejunostomy Tube (mL): 300 mL    Nasogastric/Oral tube (mL): 500 mL  Total OUT: 3540 mL    Total NET: 1016 mL      05 Aug 2021 07:01  -  05 Aug 2021 10:44  --------------------------------------------------------  IN:    Lactated Ringers: 200 mL  Total IN: 200 mL    OUT:    Chest Tube (mL): 0 mL    Chest Tube (mL): 0 mL    Indwelling Catheter - Urethral (mL): 100 mL  Total OUT: 100 mL    Total NET: 100 mL          CAPILLARY BLOOD GLUCOSE          =======================MEDICATIONS===================  MEDICATIONS  (STANDING):  albuterol/ipratropium for Nebulization 3 milliLiter(s) Nebulizer every 6 hours  heparin   Injectable 5000 Unit(s) SubCutaneous every 8 hours  HYDROmorphone PCA (1 mG/mL) 30 milliLiter(s) PCA Continuous PCA Continuous  lactated ringers. 1000 milliLiter(s) (100 mL/Hr) IV Continuous <Continuous>  pantoprazole  Injectable 40 milliGRAM(s) IV Push daily  sodium chloride 3%  Inhalation 4 milliLiter(s) Inhalation every 8 hours    MEDICATIONS  (PRN):  HYDROmorphone PCA (1 mG/mL) Rescue Clinician Bolus 0.5 milliGRAM(s) IV Push every 15 minutes PRN for Pain Scale GREATER THAN 6  naloxone Injectable 0.1 milliGRAM(s) IV Push every 3 minutes PRN For ANY of the following changes in patient status:  A. RR LESS THAN 10 breaths per minute, B. Oxygen saturation LESS THAN 90%, C. Sedation score of 6  ondansetron Injectable 4 milliGRAM(s) IV Push every 6 hours PRN Nausea      PHYSICAL EXAM============================  General:                         Awake, alert, not in any distress  Neuro:                            Moving all extremities to commands.   Respiratory:	Air entry fair and  bilateral conducted sounds                                           Effort even and unlabored.  CV:		Regular rate and rhythm. Normal S1/S2                                          Distal pulses present.  Abdomen:	                     Soft, non-distended. Bowel sounds hypoactive  Skin:		No rash.  Extremities:	Warm, no cyanosis or edema.  Palpable pulses    ============================LABS=========================                        11.2   17.42 )-----------( 151      ( 05 Aug 2021 04:45 )             35.5     08-05    138  |  103  |  17  ----------------------------<  90  4.3   |  23  |  1.17    Ca    8.3<L>      05 Aug 2021 04:45  Phos  3.1     08-05  Mg     2.10     08-05    TPro  5.2<L>  /  Alb  3.0<L>  /  TBili  1.0  /  DBili  x   /  AST  178<H>  /  ALT  199<H>  /  AlkPhos  66  08-04    LIVER FUNCTIONS - ( 04 Aug 2021 01:44 )  Alb: 3.0 g/dL / Pro: 5.2 g/dL / ALK PHOS: 66 U/L / ALT: 199 U/L / AST: 178 U/L / GGT: x           PT/INR - ( 04 Aug 2021 01:44 )   PT: 14.5 sec;   INR: 1.28 ratio         PTT - ( 04 Aug 2021 01:44 )  PTT:25.8 sec  ABG - ( 04 Aug 2021 14:53 )  pH, Arterial: 7.43  pH, Blood: x     /  pCO2: 40    /  pO2: 134   / HCO3: 26    / Base Excess: 2.0   /  SaO2: 99.2      A/P:   50yMale s/p Flex bronch, R robotic-assisted VATS mobilization of esophagus, robotic-assisted laparoscopic converted to open [2/2 dense adhesions of celiac trunk / lesser curve of stomach to liver], mobilization of gastric conduit, pyloroplasty. Neck incision for completion of proximal anastomosis.  Placement of jejunostomy tube 7/3/2021. Remained intubated and on vent support.                             Neuro:                                          Pain control with Dilaudid IVP / IV Tylenol. Start PCA after extubation                            Cardiovascular:                                           Continue hemodynamic monitoring.                                         Continue IVF LR 100cc/hr                            Respiratory:                                          Extubated on N/C, OOBTC, Aspiration precautions , ENT to f/u      CPAP wean and extubation later today                                          Monitor chest tube output                                          Chest tube to suction                                                                 Continue bronchodilators, pulmonary toilet                            GI                                          NPO                                          Continue GI prophylaxis with  Protonix                                          Continue Zofran / Reglan for nausea - PRN                                          NGT to low continuous wall suction & J-tube to gravity                                          Flush both NGT and J-tube with 20cc of sterile water Q 4hrs.  	                                                                 Renal:                                          Continue LR 100cc/hr                                          Monitor I/Os and electrolytes                                          Kenny                                                  Hem/ Onc:                                                                                   Monitor chest tube output &  signs of bleeding.                                           Follow CBC in AM                           Infectious disease:                                             Monitor for fever / leukocytosis.                                           All surgical incision / chest tube  sites look clean                            Endocrine                                              Continue Accu-Checks with coverage    Pt is on SQ Heparin and Venodyne boots for DVT prophylaxis.     Pertinent clinical, laboratory, radiographic, hemodynamic, echocardiographic, respiratory data, microbiologic data and chart were reviewed and analyzed frequently throughout the course of the day and night  Patient seen, examined and plan discussed with CT Surgeon Dr. Goodman  / CTICU team during rounds.    Status discussed with patient  / family at bedside, updated plan of care      Rico Connolly DO, FACEP

## 2021-08-05 NOTE — PROGRESS NOTE ADULT - SUBJECTIVE AND OBJECTIVE BOX
Anesthesia Pain Management Service- Attending Addendum    SUBJECTIVE: Pt doing well with IV PCA without problems reported.    Therapy:	  [ X] IV PCA	   [ ] Epidural           [ ] s/p Spinal Opoid              [ ] Postpartum infusion	  [ ] Patient controlled regional anesthesia (PCRA)    [ ] prn Analgesics    Allergies    No Known Allergies    Intolerances      MEDICATIONS  (STANDING):  albuterol/ipratropium for Nebulization 3 milliLiter(s) Nebulizer every 6 hours  dextrose 5% + sodium chloride 0.45% with potassium chloride 20 mEq/L 1000 milliLiter(s) (75 mL/Hr) IV Continuous <Continuous>  dornase duarte Solution 2.5 milliGRAM(s) Inhalation two times a day  heparin   Injectable 5000 Unit(s) SubCutaneous every 8 hours  HYDROmorphone PCA (1 mG/mL) 30 milliLiter(s) PCA Continuous PCA Continuous  pantoprazole  Injectable 40 milliGRAM(s) IV Push daily  sodium chloride 3%  Inhalation 4 milliLiter(s) Inhalation every 8 hours    MEDICATIONS  (PRN):  HYDROmorphone PCA (1 mG/mL) Rescue Clinician Bolus 0.5 milliGRAM(s) IV Push every 15 minutes PRN for Pain Scale GREATER THAN 6  naloxone Injectable 0.1 milliGRAM(s) IV Push every 3 minutes PRN For ANY of the following changes in patient status:  A. RR LESS THAN 10 breaths per minute, B. Oxygen saturation LESS THAN 90%, C. Sedation score of 6  ondansetron Injectable 4 milliGRAM(s) IV Push every 6 hours PRN Nausea      OBJECTIVE:   [X] No new signs     [ ] Other:    Side Effects:  [X ] None			[ ] Other:    Assessment of Catheter Site:		[ ] Intact		[ ] Other:    ASSESSMENT/PLAN  [ X] Continue current therapy    [ ] Therapy changed to:    [ ] IV PCA       [ ] Epidural     [ ] prn Analgesics     Comments:    Note entered after patient seen

## 2021-08-05 NOTE — PHYSICAL THERAPY INITIAL EVALUATION ADULT - DID THE PATIENT HAVE SURGERY?
Zabrina esophagectomy, Flexible bronchoscopy, Jejunostomy tube placement, Bronchoscopy, with EGD, Exploratory laparotomy/yes

## 2021-08-05 NOTE — PHYSICAL THERAPY INITIAL EVALUATION ADULT - PERTINENT HX OF CURRENT PROBLEM, REHAB EVAL
50 year old male with  PMH of esophageal dysfunction  presents to Presurgical testing with diagnosis of achalasia of cardia scheduled for upper endoscopy robotic assisted right video assisted laparoscopic neck incision, three holes esophagectomy , J tube placement  Patient with reports that he always feels that his food is stuck in his chest and not going down. States that he had several endoscopic procedures but no relief

## 2021-08-05 NOTE — PHYSICAL THERAPY INITIAL EVALUATION ADULT - MD ORDER
· Primary Surgeon	LARS Goodman MD  · Co-Surgeon	JADEN Ross MD  · Assistant(s)	BOY Gonzalez MD; LARS ALMAZAN

## 2021-08-05 NOTE — PROGRESS NOTE ADULT - SUBJECTIVE AND OBJECTIVE BOX
Anesthesia Pain Management Service    SUBJECTIVE: Pump is helping     Pain Scale Score	At rest: _7__ 	With Activity: ___ 	[X ] Refer to charted pain scores    THERAPY:    [ ] IV PCA Morphine		[ ] 5 mg/mL	[ ] 1 mg/mL  [X ] IV PCA Hydromorphone	[ ] 5 mg/mL	[X ] 1 mg/mL  [ ] IV PCA Fentanyl		[ ] 50 micrograms/mL    Demand dose __0.2_ lockout __6_ (minutes) Continuous Rate _0__ Total: _7.9__  mg used (in past 24 hours)      MEDICATIONS  (STANDING):  albuterol/ipratropium for Nebulization 3 milliLiter(s) Nebulizer every 6 hours  heparin   Injectable 5000 Unit(s) SubCutaneous every 8 hours  HYDROmorphone PCA (1 mG/mL) 30 milliLiter(s) PCA Continuous PCA Continuous  lactated ringers. 1000 milliLiter(s) (100 mL/Hr) IV Continuous <Continuous>  pantoprazole  Injectable 40 milliGRAM(s) IV Push daily  phenylephrine    Infusion 0.5 MICROgram(s)/kG/Min (12.8 mL/Hr) IV Continuous <Continuous>  sodium chloride 3%  Inhalation 4 milliLiter(s) Inhalation every 8 hours    MEDICATIONS  (PRN):  HYDROmorphone PCA (1 mG/mL) Rescue Clinician Bolus 0.5 milliGRAM(s) IV Push every 15 minutes PRN for Pain Scale GREATER THAN 6  naloxone Injectable 0.1 milliGRAM(s) IV Push every 3 minutes PRN For ANY of the following changes in patient status:  A. RR LESS THAN 10 breaths per minute, B. Oxygen saturation LESS THAN 90%, C. Sedation score of 6  ondansetron Injectable 4 milliGRAM(s) IV Push every 6 hours PRN Nausea      OBJECTIVE: sitting in bed     Sedation Score:	[ X] Alert	[ ] Drowsy 	[ ] Arousable	[ ] Asleep	[ ] Unresponsive    Side Effects:	[X ] None	[ ] Nausea	[ ] Vomiting	[ ] Pruritus  		[ ] Other:    Vital Signs Last 24 Hrs  T(C): 37.1 (05 Aug 2021 04:00), Max: 37.1 (04 Aug 2021 20:00)  T(F): 98.8 (05 Aug 2021 04:00), Max: 98.8 (05 Aug 2021 04:00)  HR: 62 (05 Aug 2021 08:00) (46 - 78)  BP: --  BP(mean): --  RR: 14 (05 Aug 2021 08:00) (10 - 22)  SpO2: 93% (05 Aug 2021 08:00) (91% - 100%)    ASSESSMENT/ PLAN    Therapy to  be:	[ X] Continue   [ ] Discontinued   [ ] Change to prn Analgesics    Documentation and Verification of current medications:   [X] Done	[ ] Not done, not elligible    Comments: Recommend non-opioid adjuvant analgesics to be used when possible and when allowed by primary surgical team.    Progress Note written now but Patient was seen earlier.

## 2021-08-05 NOTE — PHYSICAL THERAPY INITIAL EVALUATION ADULT - GAIT DEVIATIONS NOTED, PT EVAL
[facility]  Cardiothoracic Surgery  Progress Note    Patient Name: Mert Samayoa  MRN: [id number]  Admission Date: 2/17/2018  Hospital Length of Stay: 3 days  Code Status: Prior   Attending Physician: Sammi Tapia MD   Referring Provider: Guillermo Daniels MD  Principal Problem:Heart transplanted    Subjective:     Post-Op Info:  Procedure(s) (LRB):  EXPLORATION-BLEEDING (RE-EXPLORATION) (Bilateral)   1 Day Post-Op     Subjective:     Post-Op Info:  Procedure(s) (LRB):  EXPLORATION-BLEEDING (RE-EXPLORATION) (Bilateral)   1 Day Post-Op        Interval History:   Taken back yesterday for bleeding. Has been stable since. Extubated yesterday morning. Issues with pain. Hyperkalemic, shifted overnight.       Medications:  Continuous Infusions:   DOBUTamine 5 mcg/kg/min (02/20/18 0800)    epinephrine 0.05 mcg/kg/min (02/19/18 1700)    furosemide (LASIX) 5 mg/mL infusion (non-titrating)      insulin (HUMAN R) infusion (adults) 1.4 Units/hr (02/20/18 0800)    morphine       Scheduled Meds:   acetaminophen  1,000 mg Intravenous Q8H    albuterol        aspirin  325 mg Oral Daily    famotidine (PF)  20 mg Intravenous BID    ipratropium  0.5 mg Nebulization Q4H    methylPREDNISolone sodium succinate  40 mg Intravenous Q12H    mupirocin  1 g Nasal BID    mycophenolate (CELLCEPT) IVPB  1,000 mg Intravenous Q12H    nystatin  500,000 Units Mouth/Throat QID (WM & HS)    polyethylene glycol  17 g Oral Daily    polyethylene glycol  17 g Oral BID    potassium chloride  20 mEq Intravenous Once    sodium chloride 0.9%  3 mL Intravenous Q8H    tacrolimus  1 mg Oral BID     PRN Meds:sodium chloride, albuterol sulfate, [COMPLETED] calcium gluconate IVPB **AND** calcium gluconate IVPB, dextrose 50%, dextrose 50%, [COMPLETED] dextrose 50% **AND** dextrose 50% **AND** [COMPLETED] insulin regular, lactated ringers, lactulose, magnesium sulfate IVPB, metoclopramide HCl, naloxone, ondansetron,  potassium chloride **AND** potassium chloride **AND** potassium chloride     Objective:     Vital Signs (Most Recent):  Temp: 98.4 °F (36.9 °C) (02/20/18 0701)  Pulse: 110 (02/20/18 0845)  Resp: 20 (02/20/18 0845)  BP: 139/86 (02/20/18 0800)  SpO2: 100 % (02/20/18 0845) Vital Signs (24h Range):  Temp:  [98.3 °F (36.8 °C)-98.5 °F (36.9 °C)] 98.4 °F (36.9 °C)  Pulse:  [109-111] 110  Resp:  [11-42] 20  SpO2:  [85 %-100 %] 100 %  BP: (114-147)/(61-86) 139/86  Arterial Line BP: (104-165)/(44-73) 139/55       Intake/Output Summary (Last 24 hours) at 02/20/18 0904  Last data filed at 02/20/18 0701   Gross per 24 hour   Intake             2749 ml   Output             2975 ml   Net             -226 ml       Physical Exam   Constitutional: He is oriented to person, place, and time. He appears well-developed and well-nourished. No distress.   Cardiovascular:   Paced at 110   Pulmonary/Chest: Effort normal and breath sounds normal. No respiratory distress.   Abdominal: Soft. He exhibits no distension.   Neurological: He is alert and oriented to person, place, and time.   Skin: Skin is warm and dry.   Psychiatric: He has a normal mood and affect.       Significant Labs:  BMP:   Recent Labs  Lab 02/20/18  0400 02/20/18 0722   *  --      --    K 4.6 5.4*     --    CO2 27  --    BUN 19  --    CREATININE 1.1  --    CALCIUM 8.2*  --    MG 2.1  --      CBC:   Recent Labs  Lab 02/20/18 0400   WBC 11.61   RBC 2.42*   HGB 7.4*   HCT 21.3*   PLT 58*   MCV 88   MCH 30.6   MCHC 34.7       Significant Diagnostics:  CXR: I have reviewed all pertinent results/findings within the past 24 hours        Assessment/Plan:     * Heart transplanted    Neuro:   - Pain mgmt: Scheduled tylenol, morphine PCA       Pulmonary:   Frequent IS, nebs  Maintain chest tubes     Cardiac:   - Drips: dobutamine 5  - ASA 325mg  - Immunosuppression per HTS     Renal:    - UOP 2.3L (increased output with lasix during K shift)  - BUN/Cr stable  -  Lasix gtt       ID:   -  Ancef post op     Hem/Onc:   - Monitor hgb; down to 7.4 and 21  - Repeat CBC at 4pm           Endocrine:    - Glucose management per endocrine     Fluids/Electrolytes/Nutrition/GI:   - Replace electrolytes PRN per protocol  - Advance diet as tolerated     DISPO:  - Transfer to [facility]              Fina Tolliver MD  Cardiothoracic Surgery  [facility]   decreased shonna/decreased step length

## 2021-08-05 NOTE — CHART NOTE - NSCHARTNOTEFT_GEN_A_CORE
Diet, NPO (08-03-21 @ 15:40)      Contacted by unit PA to transition pt from continuous 24 hr feeding via J-tube to nocturnal feeding in anticipation for eventual d/c. Currently NPO 2* possible extubation. Pt had been receiving Jevity 1.2 @ goal rate of 60 ml/hr continuously over 24 hrs. providing 1728 kcal, 80 gm protein, 1162 ml free H2O in a total volume of 1440 ml/day.  To transition to nocturnal feeding, pt will require a more concentrated formula such as TwoCal HN to assist with meeting pt's estimated nutrition needs. Suggest TwoCal HN with initiation rate of 25 ml/hr, increasing rate by 20 ml/hr, to goal rate of 55 ml/hr over 16 hrs nocturnally. This will offer pt 1760 kcal, 74 gm protein, 616 ml free H2O in a total volume of 880 ml/day. Est soco need: 1700 - 1904 kcal/day (25-28 kcal/kg); Est pro need: 70-95 gm/day (1.0-1.3 gm/kg). Continue to monitor tolerance to formula and rate as nocturnal feeding. RDN services to remain available as needed.

## 2021-08-06 LAB
ALBUMIN SERPL ELPH-MCNC: 3.2 G/DL — LOW (ref 3.3–5)
ALP SERPL-CCNC: 59 U/L — SIGNIFICANT CHANGE UP (ref 40–120)
ALT FLD-CCNC: 130 U/L — HIGH (ref 4–41)
ANION GAP SERPL CALC-SCNC: 9 MMOL/L — SIGNIFICANT CHANGE UP (ref 7–14)
AST SERPL-CCNC: 94 U/L — HIGH (ref 4–40)
BILIRUB SERPL-MCNC: 1.1 MG/DL — SIGNIFICANT CHANGE UP (ref 0.2–1.2)
BUN SERPL-MCNC: 15 MG/DL — SIGNIFICANT CHANGE UP (ref 7–23)
CALCIUM SERPL-MCNC: 8.6 MG/DL — SIGNIFICANT CHANGE UP (ref 8.4–10.5)
CHLORIDE SERPL-SCNC: 101 MMOL/L — SIGNIFICANT CHANGE UP (ref 98–107)
CO2 SERPL-SCNC: 24 MMOL/L — SIGNIFICANT CHANGE UP (ref 22–31)
CREAT SERPL-MCNC: 0.92 MG/DL — SIGNIFICANT CHANGE UP (ref 0.5–1.3)
GLUCOSE SERPL-MCNC: 101 MG/DL — HIGH (ref 70–99)
HCT VFR BLD CALC: 33.9 % — LOW (ref 39–50)
HGB BLD-MCNC: 10.9 G/DL — LOW (ref 13–17)
MAGNESIUM SERPL-MCNC: 1.9 MG/DL — SIGNIFICANT CHANGE UP (ref 1.6–2.6)
MCHC RBC-ENTMCNC: 29.8 PG — SIGNIFICANT CHANGE UP (ref 27–34)
MCHC RBC-ENTMCNC: 32.2 GM/DL — SIGNIFICANT CHANGE UP (ref 32–36)
MCV RBC AUTO: 92.6 FL — SIGNIFICANT CHANGE UP (ref 80–100)
NRBC # BLD: 0 /100 WBCS — SIGNIFICANT CHANGE UP
NRBC # FLD: 0 K/UL — SIGNIFICANT CHANGE UP
PHOSPHATE SERPL-MCNC: 2 MG/DL — LOW (ref 2.5–4.5)
PLATELET # BLD AUTO: 132 K/UL — LOW (ref 150–400)
POTASSIUM SERPL-MCNC: 4.2 MMOL/L — SIGNIFICANT CHANGE UP (ref 3.5–5.3)
POTASSIUM SERPL-SCNC: 4.2 MMOL/L — SIGNIFICANT CHANGE UP (ref 3.5–5.3)
PROT SERPL-MCNC: 5.7 G/DL — LOW (ref 6–8.3)
RBC # BLD: 3.66 M/UL — LOW (ref 4.2–5.8)
RBC # FLD: 12.4 % — SIGNIFICANT CHANGE UP (ref 10.3–14.5)
SODIUM SERPL-SCNC: 134 MMOL/L — LOW (ref 135–145)
WBC # BLD: 14.95 K/UL — HIGH (ref 3.8–10.5)
WBC # FLD AUTO: 14.95 K/UL — HIGH (ref 3.8–10.5)

## 2021-08-06 PROCEDURE — 71045 X-RAY EXAM CHEST 1 VIEW: CPT | Mod: 26,77

## 2021-08-06 PROCEDURE — 71045 X-RAY EXAM CHEST 1 VIEW: CPT | Mod: 26

## 2021-08-06 PROCEDURE — 99233 SBSQ HOSP IP/OBS HIGH 50: CPT

## 2021-08-06 RX ORDER — SODIUM CHLORIDE 9 MG/ML
1000 INJECTION, SOLUTION INTRAVENOUS
Refills: 0 | Status: DISCONTINUED | OUTPATIENT
Start: 2021-08-06 | End: 2021-08-10

## 2021-08-06 RX ORDER — SODIUM CHLORIDE 9 MG/ML
4 INJECTION INTRAMUSCULAR; INTRAVENOUS; SUBCUTANEOUS EVERY 6 HOURS
Refills: 0 | Status: DISCONTINUED | OUTPATIENT
Start: 2021-08-06 | End: 2021-08-18

## 2021-08-06 RX ORDER — ALBUTEROL 90 UG/1
2.5 AEROSOL, METERED ORAL EVERY 6 HOURS
Refills: 0 | Status: DISCONTINUED | OUTPATIENT
Start: 2021-08-06 | End: 2021-08-18

## 2021-08-06 RX ORDER — ACETAMINOPHEN 500 MG
1000 TABLET ORAL ONCE
Refills: 0 | Status: DISCONTINUED | OUTPATIENT
Start: 2021-08-06 | End: 2021-08-07

## 2021-08-06 RX ADMIN — DORNASE ALFA 2.5 MILLIGRAM(S): 1 SOLUTION RESPIRATORY (INHALATION) at 11:22

## 2021-08-06 RX ADMIN — SODIUM CHLORIDE 50 MILLILITER(S): 9 INJECTION, SOLUTION INTRAVENOUS at 10:34

## 2021-08-06 RX ADMIN — Medication 3 MILLILITER(S): at 03:53

## 2021-08-06 RX ADMIN — HEPARIN SODIUM 5000 UNIT(S): 5000 INJECTION INTRAVENOUS; SUBCUTANEOUS at 14:31

## 2021-08-06 RX ADMIN — PANTOPRAZOLE SODIUM 40 MILLIGRAM(S): 20 TABLET, DELAYED RELEASE ORAL at 11:09

## 2021-08-06 RX ADMIN — Medication 85 MILLIMOLE(S): at 10:31

## 2021-08-06 RX ADMIN — SODIUM CHLORIDE 4 MILLILITER(S): 9 INJECTION INTRAMUSCULAR; INTRAVENOUS; SUBCUTANEOUS at 11:06

## 2021-08-06 RX ADMIN — HYDROMORPHONE HYDROCHLORIDE 30 MILLILITER(S): 2 INJECTION INTRAMUSCULAR; INTRAVENOUS; SUBCUTANEOUS at 23:06

## 2021-08-06 RX ADMIN — DEXTROSE MONOHYDRATE, SODIUM CHLORIDE, AND POTASSIUM CHLORIDE 75 MILLILITER(S): 50; .745; 4.5 INJECTION, SOLUTION INTRAVENOUS at 07:34

## 2021-08-06 RX ADMIN — ALBUTEROL 2.5 MILLIGRAM(S): 90 AEROSOL, METERED ORAL at 11:06

## 2021-08-06 RX ADMIN — ALBUTEROL 2.5 MILLIGRAM(S): 90 AEROSOL, METERED ORAL at 15:50

## 2021-08-06 RX ADMIN — SODIUM CHLORIDE 4 MILLILITER(S): 9 INJECTION INTRAMUSCULAR; INTRAVENOUS; SUBCUTANEOUS at 21:44

## 2021-08-06 RX ADMIN — HEPARIN SODIUM 5000 UNIT(S): 5000 INJECTION INTRAVENOUS; SUBCUTANEOUS at 22:04

## 2021-08-06 RX ADMIN — HEPARIN SODIUM 5000 UNIT(S): 5000 INJECTION INTRAVENOUS; SUBCUTANEOUS at 06:19

## 2021-08-06 RX ADMIN — ALBUTEROL 2.5 MILLIGRAM(S): 90 AEROSOL, METERED ORAL at 21:44

## 2021-08-06 RX ADMIN — SODIUM CHLORIDE 4 MILLILITER(S): 9 INJECTION INTRAMUSCULAR; INTRAVENOUS; SUBCUTANEOUS at 16:01

## 2021-08-06 RX ADMIN — DORNASE ALFA 2.5 MILLIGRAM(S): 1 SOLUTION RESPIRATORY (INHALATION) at 21:44

## 2021-08-06 NOTE — PROGRESS NOTE ADULT - SUBJECTIVE AND OBJECTIVE BOX
Anesthesia Pain Management Service- Attending Addendum    SUBJECTIVE: Pt doing well with IV PCA without problems reported.    Therapy:	  [ X] IV PCA	   [ ] Epidural           [ ] s/p Spinal Opoid              [ ] Postpartum infusion	  [ ] Patient controlled regional anesthesia (PCRA)    [ ] prn Analgesics    Allergies    No Known Allergies    Intolerances      MEDICATIONS  (STANDING):  acetaminophen  IVPB .. 1000 milliGRAM(s) IV Intermittent once  ALBUTerol    0.083% 2.5 milliGRAM(s) Nebulizer every 6 hours  dextrose 5% + sodium chloride 0.9%. 1000 milliLiter(s) (50 mL/Hr) IV Continuous <Continuous>  dornase duarte Solution 2.5 milliGRAM(s) Inhalation two times a day  heparin   Injectable 5000 Unit(s) SubCutaneous every 8 hours  HYDROmorphone PCA (1 mG/mL) 30 milliLiter(s) PCA Continuous PCA Continuous  pantoprazole  Injectable 40 milliGRAM(s) IV Push daily  sodium chloride 3%  Inhalation 4 milliLiter(s) Inhalation every 6 hours    MEDICATIONS  (PRN):  HYDROmorphone PCA (1 mG/mL) Rescue Clinician Bolus 0.5 milliGRAM(s) IV Push every 15 minutes PRN for Pain Scale GREATER THAN 6  naloxone Injectable 0.1 milliGRAM(s) IV Push every 3 minutes PRN For ANY of the following changes in patient status:  A. RR LESS THAN 10 breaths per minute, B. Oxygen saturation LESS THAN 90%, C. Sedation score of 6  ondansetron Injectable 4 milliGRAM(s) IV Push every 6 hours PRN Nausea      OBJECTIVE:   [X] No new signs     [ ] Other:    Side Effects:  [X ] None			[ ] Other:    Assessment of Catheter Site:		[ ] Intact		[ ] Other:    ASSESSMENT/PLAN  [ X] Continue current therapy    [ ] Therapy changed to:    [ ] IV PCA       [ ] Epidural     [ ] prn Analgesics     Comments:    Note entered after patient seen

## 2021-08-06 NOTE — PATIENT PROFILE ADULT - SURGICAL SITE DRAIN
Form completed and sent to Physician's Office electronically via Nurse Pool for review and signature.     Completed form will be picked up at [facility], call [phone]  
Form received at [facility] on 8/10/2018.    Please note that it takes 7-10 business days for completion.     Authorization on file.  
Forms reviewed, signed, and faxed back to forms completion.   
Received message from patient asking status, LM with patient that form is available for pickup.  
Received signed and completed form from Physician's Office.    Form sent to [place] for   
no

## 2021-08-06 NOTE — PROGRESS NOTE ADULT - SUBJECTIVE AND OBJECTIVE BOX
CHIEF COMPLAINT: FOLLOW UP IN ICU FOR POSTOPERATIVE CARE OF PATIENT WHO IS S/P       PROCEDURES: R robotic-assisted VATS mobilization of esophagus, robotic-assisted laparoscopic converted to open [2/2 dense adhesions of celiac trunk / lesser curve of stomach to liver], mobilization of gastric conduit, pyloroplasty. Neck incision for completion of proximal anastomosis.  Placement of jejunostomy tube.  03-Aug-2021         ISSUES:   Acute post operative anemia  Achalasia  Post op pain  Chest tube in place  Hyponatremia    INTERVAL EVENTS:   Chest tube with no airleak. Chest tube to be discontinued today.      HISTORY:   Patient reports moderate pain at abdominal incision sites which is worse with coughing and deep breathing without associated fever or dyspnea. Pain is improved with use of pain meds.     PHYSICAL EXAM:   Gen: Comfortable, No acute distress  Eyes: Sclera white, Conjunctiva normal, Eyelids normal, Pupils symmetrical   ENT: Mucous membranes moist,  ,  ,    Neck: Trachea midline,  ,  ,  ,  ,    CV: Rate regular, Rhythm regular,  ,  ,    Resp: Breath sounds wheezing, No accessory muscles use, Two R chest tube in place,  ,    Abd: Soft, Non-distended, Non-tender, Bowel sounds normal,  ,  ,    Skin: Warm, No peripheral edema of lower extremities,  ,    : No stearns  Neuro: Moving all 4 extremities,    Psych: A&Ox3      ASSESSMENT AND PLAN:     NEURO:  Post-operative Pain - Stable. Pain control with PCA and Tylenol IV PRN.             RESPIRATORY:  Hypoxia - Wean nasal cannula for goal O2sat above 92. Obtain CXR. Incentive spirometry. Chest PT and frequent suctioning. Continue bronchodilators. OOB to chair & ambulate w/ assistance. Continuous pulse oximetry for support & to prevent decompensation.       Drain - Monitor bulb output.          CARDIOVASCULAR:  Hemodynamically stable - Not on pressors. Continue hemodynamic monitoring.  Telemetry (medical test) - Reviewed by me today independently. Normal sinus rhythm.        RENAL:  Stable - Monitor IOs and electrolytes. Keep K above 4.0 and Mg above 2.0.     Hyponatremia - stable. combination hypovolemia with possible SIADH. Changed to NS IVF. Monitor Na.    GASTROINTESTINAL:  GI prophylaxis not indicated  Zofran and Reglan IV PRN for nausea  NPO until swallow study  J tube tube feeds  NG tube to low suction    Achalasia - s/p esophagectomy. Awaiting swallow study. Continue NG tube to low suction.    HEMATOLOGIC:  Acute post-operative anemia - Worsened -- Downtrending Hgb. Monitor CBC. Transfuse PRBC as needed. Monitor chest tube output.  DVT prophylaxis with heparin subQ and SCDs.           INFECTIOUS DISEASE:  All surgical sites appear clean. Will monitor for fever and leukocytosis.       ENDOCRINE:  Stable – Monitor glucose fingersticks for goal 120-180.            Pertinent clinical, laboratory, radiographic, hemodynamic, echocardiographic, respiratory data, microbiologic data and chart were reviewed by myself and analyzed frequently throughout the course of the day and night by myself.    Plan discussed at length with the CTICU staff and Attending CT Surgeon -   Dr Anh Goodman.     _________________________  VITAL SIGNS:  Vital Signs Last 24 Hrs  T(C): 36.9 (06 Aug 2021 12:00), Max: 37.4 (06 Aug 2021 04:00)  T(F): 98.5 (06 Aug 2021 12:00), Max: 99.4 (06 Aug 2021 04:00)  HR: 60 (06 Aug 2021 12:00) (55 - 83)  BP: --  BP(mean): --  RR: 15 (06 Aug 2021 12:00) (8 - 18)  SpO2: 100% (06 Aug 2021 12:00) (94% - 100%)  I/Os:   I&O's Detail    05 Aug 2021 07:01  -  06 Aug 2021 07:00  --------------------------------------------------------  IN:    dextrose 5% + sodium chloride 0.45% w/ Additives: 900 mL    Enteral Tube Flush: 150 mL    Enteral Tube Flush: 150 mL    Lactated Ringers: 800 mL  Total IN: 2000 mL    OUT:    Chest Tube (mL): 230 mL    Chest Tube (mL): 30 mL    Indwelling Catheter - Urethral (mL): 100 mL    Jejunostomy Tube (mL): 150 mL    Nasogastric/Oral tube (mL): 400 mL    Voided (mL): 1150 mL  Total OUT: 2060 mL    Total NET: -60 mL      06 Aug 2021 07:01  -  06 Aug 2021 12:31  --------------------------------------------------------  IN:    dextrose 5% + sodium chloride 0.45% w/ Additives: 150 mL    dextrose 5% + sodium chloride 0.9%: 150 mL    Enteral Tube Flush: 60 mL    Enteral Tube Flush: 60 mL    IV PiggyBack: 100 mL  Total IN: 520 mL    OUT:    Chest Tube (mL): 50 mL    Chest Tube (mL): 90 mL    Jejunostomy Tube (mL): 30 mL    Voided (mL): 800 mL  Total OUT: 970 mL    Total NET: -450 mL              MEDICATIONS:  MEDICATIONS  (STANDING):  acetaminophen  IVPB .. 1000 milliGRAM(s) IV Intermittent once  ALBUTerol    0.083% 2.5 milliGRAM(s) Nebulizer every 6 hours  dextrose 5% + sodium chloride 0.9%. 1000 milliLiter(s) (50 mL/Hr) IV Continuous <Continuous>  dornase duarte Solution 2.5 milliGRAM(s) Inhalation two times a day  heparin   Injectable 5000 Unit(s) SubCutaneous every 8 hours  HYDROmorphone PCA (1 mG/mL) 30 milliLiter(s) PCA Continuous PCA Continuous  pantoprazole  Injectable 40 milliGRAM(s) IV Push daily  sodium chloride 3%  Inhalation 4 milliLiter(s) Inhalation every 6 hours    MEDICATIONS  (PRN):  HYDROmorphone PCA (1 mG/mL) Rescue Clinician Bolus 0.5 milliGRAM(s) IV Push every 15 minutes PRN for Pain Scale GREATER THAN 6  naloxone Injectable 0.1 milliGRAM(s) IV Push every 3 minutes PRN For ANY of the following changes in patient status:  A. RR LESS THAN 10 breaths per minute, B. Oxygen saturation LESS THAN 90%, C. Sedation score of 6  ondansetron Injectable 4 milliGRAM(s) IV Push every 6 hours PRN Nausea      LABS:  Laboratory data was independently reviewed by me today.                           10.9   14.95 )-----------( 132      ( 06 Aug 2021 02:46 )             33.9     08-06    134<L>  |  101  |  15  ----------------------------<  101<H>  4.2   |  24  |  0.92    Ca    8.6      06 Aug 2021 02:46  Phos  2.0     08-06  Mg     1.90     08-06    TPro  5.7<L>  /  Alb  3.2<L>  /  TBili  1.1  /  DBili  x   /  AST  94<H>  /  ALT  130<H>  /  AlkPhos  59  08-06    LIVER FUNCTIONS - ( 06 Aug 2021 02:46 )  Alb: 3.2 g/dL / Pro: 5.7 g/dL / ALK PHOS: 59 U/L / ALT: 130 U/L / AST: 94 U/L / GGT: x             ABG - ( 04 Aug 2021 14:53 )  pH, Arterial: 7.43  pH, Blood: x     /  pCO2: 40    /  pO2: 134   / HCO3: 26    / Base Excess: 2.0   /  SaO2: 99.2                  RADIOLOGY:   Radiology images were independently reviewed by me today. Reports were reviewed by me today.    Xray Chest 1 View- PORTABLE-Routine:   EXAM:  XR CHEST PORTABLE ROUTINE 1V        PROCEDURE DATE:  Aug  5 2021         INTERPRETATION:  TIME OF EXAM: August 5, 2021 at 5:15 AM    CLINICAL INFORMATION: Follow-up post esophagectomy.    TECHNIQUE:   Portable chest    INTERPRETATION:    Enteric tube is seen with tip at level of the cardioesophageal junction. 2 chest tubes is seen. Postop pneumoperitoneum.    Lungs are clear, heart is not enlarged and there is no effusions.      COMPARISON:  August 4      IMPRESSION:  Status post esophagectomy with chest tubes and postop pneumoperitoneum but clear lungs.    --- End of Report ---              ALONDRA HOLLEY MD; Attending Radiologist  This document has been electronically signed. Aug  5 2021 11:12AM (08-05-21 @ 07:21)  Xray Chest 1 View AP/PA:   EXAM:  XR CHEST AP OR PA 1V        PROCEDURE DATE:  Aug  4 2021         INTERPRETATION:  Chest one view    HISTORY: Postop    COMPARISON STUDY: 7/30/2021    Frontal expiratory view of the chest shows the heart to be normal in size. Endotracheal tube is present. Nasogastric tube reaches the level of the lower esophagus. The lungs are clear and there is no evidence of pneumothorax nor pleural effusion.    IMPRESSION:  No active pulmonary disease.    Thank you for the courtesy of this referral.    --- End of Report ---              JACEK LUX MD; Attending Interventional Radiologist  This document has been electronically signed. Aug  4 2021  1:24PM (08-04-21 @ 06:31)        Patient's status was discussed with patient at bedside.           ________________________________________________           CHIEF COMPLAINT: FOLLOW UP IN ICU FOR POSTOPERATIVE CARE OF PATIENT WHO IS S/P Three hole Esophagogastrectomy      PROCEDURES: R robotic-assisted VATS mobilization of esophagus, robotic-assisted laparoscopic converted to open [2/2 dense adhesions of celiac trunk / lesser curve of stomach to liver], mobilization of gastric conduit, pyloroplasty. Neck incision for completion of proximal anastomosis.  Placement of jejunostomy tube.  03-Aug-2021         ISSUES:   Acute post operative anemia  Achalasia  Post op pain  Chest tube in place  Hyponatremia    INTERVAL EVENTS:   Chest tube with no airleak. Chest tube to be discontinued today.      HISTORY:   Patient reports moderate pain at abdominal incision sites which is worse with coughing and deep breathing without associated fever or dyspnea. Pain is improved with use of pain meds.     PHYSICAL EXAM:   Gen: Comfortable, No acute distress  Eyes: Sclera white, Conjunctiva normal, Eyelids normal, Pupils symmetrical   ENT: Mucous membranes moist,  ,  ,    Neck: Trachea midline,  ,  ,  ,  ,    CV: Rate regular, Rhythm regular,  ,  ,    Resp: Breath sounds wheezing, No accessory muscles use, Two R chest tube in place,  ,    Abd: Soft, Non-distended, Non-tender, Bowel sounds normal,  ,  ,    Skin: Warm, No peripheral edema of lower extremities,  ,    : No stearns  Neuro: Moving all 4 extremities,    Psych: A&Ox3      ASSESSMENT AND PLAN:     NEURO:  Post-operative Pain - Stable. Pain control with PCA and Tylenol IV PRN.             RESPIRATORY:  Hypoxia - Wean nasal cannula for goal O2sat above 92. Obtain CXR. Incentive spirometry. Chest PT and frequent suctioning. Continue bronchodilators. OOB to chair & ambulate w/ assistance. Continuous pulse oximetry for support & to prevent decompensation.       Drain - Monitor bulb output.          CARDIOVASCULAR:  Hemodynamically stable - Not on pressors. Continue hemodynamic monitoring.  Telemetry (medical test) - Reviewed by me today independently. Normal sinus rhythm.        RENAL:  Stable - Monitor IOs and electrolytes. Keep K above 4.0 and Mg above 2.0.     Hyponatremia - stable. combination hypovolemia with possible SIADH. Changed to NS IVF. Monitor Na.    GASTROINTESTINAL:  GI prophylaxis not indicated  Zofran and Reglan IV PRN for nausea  NPO until swallow study  J tube tube feeds  NG tube to low suction    Achalasia - s/p esophagectomy. Awaiting swallow study. Continue NG tube to low suction.    HEMATOLOGIC:  Acute post-operative anemia - Worsened -- Downtrending Hgb. Monitor CBC. Transfuse PRBC as needed. Monitor chest tube output.  DVT prophylaxis with heparin subQ and SCDs.           INFECTIOUS DISEASE:  All surgical sites appear clean. Will monitor for fever and leukocytosis.       ENDOCRINE:  Stable – Monitor glucose fingersticks for goal 120-180.            Pertinent clinical, laboratory, radiographic, hemodynamic, echocardiographic, respiratory data, microbiologic data and chart were reviewed by myself and analyzed frequently throughout the course of the day and night by myself.    Plan discussed at length with the CTICU staff and Attending CT Surgeon -   Dr Anh Goodman.     _________________________  VITAL SIGNS:  Vital Signs Last 24 Hrs  T(C): 36.9 (06 Aug 2021 12:00), Max: 37.4 (06 Aug 2021 04:00)  T(F): 98.5 (06 Aug 2021 12:00), Max: 99.4 (06 Aug 2021 04:00)  HR: 60 (06 Aug 2021 12:00) (55 - 83)  BP: --  BP(mean): --  RR: 15 (06 Aug 2021 12:00) (8 - 18)  SpO2: 100% (06 Aug 2021 12:00) (94% - 100%)  I/Os:   I&O's Detail    05 Aug 2021 07:01  -  06 Aug 2021 07:00  --------------------------------------------------------  IN:    dextrose 5% + sodium chloride 0.45% w/ Additives: 900 mL    Enteral Tube Flush: 150 mL    Enteral Tube Flush: 150 mL    Lactated Ringers: 800 mL  Total IN: 2000 mL    OUT:    Chest Tube (mL): 230 mL    Chest Tube (mL): 30 mL    Indwelling Catheter - Urethral (mL): 100 mL    Jejunostomy Tube (mL): 150 mL    Nasogastric/Oral tube (mL): 400 mL    Voided (mL): 1150 mL  Total OUT: 2060 mL    Total NET: -60 mL      06 Aug 2021 07:01  -  06 Aug 2021 12:31  --------------------------------------------------------  IN:    dextrose 5% + sodium chloride 0.45% w/ Additives: 150 mL    dextrose 5% + sodium chloride 0.9%: 150 mL    Enteral Tube Flush: 60 mL    Enteral Tube Flush: 60 mL    IV PiggyBack: 100 mL  Total IN: 520 mL    OUT:    Chest Tube (mL): 50 mL    Chest Tube (mL): 90 mL    Jejunostomy Tube (mL): 30 mL    Voided (mL): 800 mL  Total OUT: 970 mL    Total NET: -450 mL              MEDICATIONS:  MEDICATIONS  (STANDING):  acetaminophen  IVPB .. 1000 milliGRAM(s) IV Intermittent once  ALBUTerol    0.083% 2.5 milliGRAM(s) Nebulizer every 6 hours  dextrose 5% + sodium chloride 0.9%. 1000 milliLiter(s) (50 mL/Hr) IV Continuous <Continuous>  dornase duarte Solution 2.5 milliGRAM(s) Inhalation two times a day  heparin   Injectable 5000 Unit(s) SubCutaneous every 8 hours  HYDROmorphone PCA (1 mG/mL) 30 milliLiter(s) PCA Continuous PCA Continuous  pantoprazole  Injectable 40 milliGRAM(s) IV Push daily  sodium chloride 3%  Inhalation 4 milliLiter(s) Inhalation every 6 hours    MEDICATIONS  (PRN):  HYDROmorphone PCA (1 mG/mL) Rescue Clinician Bolus 0.5 milliGRAM(s) IV Push every 15 minutes PRN for Pain Scale GREATER THAN 6  naloxone Injectable 0.1 milliGRAM(s) IV Push every 3 minutes PRN For ANY of the following changes in patient status:  A. RR LESS THAN 10 breaths per minute, B. Oxygen saturation LESS THAN 90%, C. Sedation score of 6  ondansetron Injectable 4 milliGRAM(s) IV Push every 6 hours PRN Nausea      LABS:  Laboratory data was independently reviewed by me today.                           10.9   14.95 )-----------( 132      ( 06 Aug 2021 02:46 )             33.9     08-06    134<L>  |  101  |  15  ----------------------------<  101<H>  4.2   |  24  |  0.92    Ca    8.6      06 Aug 2021 02:46  Phos  2.0     08-06  Mg     1.90     08-06    TPro  5.7<L>  /  Alb  3.2<L>  /  TBili  1.1  /  DBili  x   /  AST  94<H>  /  ALT  130<H>  /  AlkPhos  59  08-06    LIVER FUNCTIONS - ( 06 Aug 2021 02:46 )  Alb: 3.2 g/dL / Pro: 5.7 g/dL / ALK PHOS: 59 U/L / ALT: 130 U/L / AST: 94 U/L / GGT: x             ABG - ( 04 Aug 2021 14:53 )  pH, Arterial: 7.43  pH, Blood: x     /  pCO2: 40    /  pO2: 134   / HCO3: 26    / Base Excess: 2.0   /  SaO2: 99.2                  RADIOLOGY:   Radiology images were independently reviewed by me today. Reports were reviewed by me today.    Xray Chest 1 View- PORTABLE-Routine:   EXAM:  XR CHEST PORTABLE ROUTINE 1V        PROCEDURE DATE:  Aug  5 2021         INTERPRETATION:  TIME OF EXAM: August 5, 2021 at 5:15 AM    CLINICAL INFORMATION: Follow-up post esophagectomy.    TECHNIQUE:   Portable chest    INTERPRETATION:    Enteric tube is seen with tip at level of the cardioesophageal junction. 2 chest tubes is seen. Postop pneumoperitoneum.    Lungs are clear, heart is not enlarged and there is no effusions.      COMPARISON:  August 4      IMPRESSION:  Status post esophagectomy with chest tubes and postop pneumoperitoneum but clear lungs.    --- End of Report ---              ALONDRA HOLLEY MD; Attending Radiologist  This document has been electronically signed. Aug  5 2021 11:12AM (08-05-21 @ 07:21)  Xray Chest 1 View AP/PA:   EXAM:  XR CHEST AP OR PA 1V        PROCEDURE DATE:  Aug  4 2021         INTERPRETATION:  Chest one view    HISTORY: Postop    COMPARISON STUDY: 7/30/2021    Frontal expiratory view of the chest shows the heart to be normal in size. Endotracheal tube is present. Nasogastric tube reaches the level of the lower esophagus. The lungs are clear and there is no evidence of pneumothorax nor pleural effusion.    IMPRESSION:  No active pulmonary disease.    Thank you for the courtesy of this referral.    --- End of Report ---              JACEK JOSE J MD; Attending Interventional Radiologist  This document has been electronically signed. Aug  4 2021  1:24PM (08-04-21 @ 06:31)        Patient's status was discussed with patient at bedside.           ________________________________________________           CHIEF COMPLAINT: FOLLOW UP IN ICU FOR POSTOPERATIVE CARE OF PATIENT WHO IS S/P Three hole Esophagogastrectomy      PROCEDURES: R robotic-assisted VATS mobilization of esophagus, robotic-assisted laparoscopic converted to open [2/2 dense adhesions of celiac trunk / lesser curve of stomach to liver], mobilization of gastric conduit, pyloroplasty. Neck incision for completion of proximal anastomosis.  Placement of jejunostomy tube.  03-Aug-2021         ISSUES:   Acute post operative anemia  Achalasia  Post op pain  Chest tube in place  Hyponatremia    INTERVAL EVENTS:   Chest tube with no airleak. Chest tube to be discontinued today.      HISTORY:   Patient reports moderate pain at abdominal incision sites which is worse with coughing and deep breathing without associated fever or dyspnea. Pain is improved with use of pain meds.     PHYSICAL EXAM:   Gen: Comfortable, No acute distress  Eyes: Sclera white, Conjunctiva normal, Eyelids normal, Pupils symmetrical   ENT: Mucous membranes moist,  ,  ,  NG tube in place. Neck incision without erythema or drainage.  Neck: Trachea midline,  ,  ,  ,  ,    CV: Rate regular, Rhythm regular,  ,  ,    Resp: Breath sounds wheezing, No accessory muscles use, Two R chest tube in place,  ,    Abd: Soft, Non-distended, Non-tender, Bowel sounds normal,  ,  ,  Feeding tube in place  Skin: Warm, No peripheral edema of lower extremities,  ,    : No stearns  Neuro: Moving all 4 extremities,    Psych: A&Ox3      ASSESSMENT AND PLAN:     NEURO:  Post-operative Pain - Stable. Pain control with PCA and Tylenol IV PRN.             RESPIRATORY:  Hypoxia - Wean nasal cannula for goal O2sat above 92. Obtain CXR. Incentive spirometry. Chest PT and frequent suctioning. Continue bronchodilators. OOB to chair & ambulate w/ assistance. Continuous pulse oximetry for support & to prevent decompensation.       Drain - Monitor bulb output.          CARDIOVASCULAR:  Hemodynamically stable - Not on pressors. Continue hemodynamic monitoring.  Telemetry (medical test) - Reviewed by me today independently. Normal sinus rhythm.        RENAL:  Stable - Monitor IOs and electrolytes. Keep K above 4.0 and Mg above 2.0.     Hyponatremia - stable. combination hypovolemia with possible SIADH. Changed to NS IVF. Monitor Na.    GASTROINTESTINAL:  GI prophylaxis not indicated  Zofran and Reglan IV PRN for nausea  NPO until swallow study  J tube tube feeds  NG tube to low suction    Achalasia - s/p esophagectomy. Awaiting swallow study. Continue NG tube to low suction.    HEMATOLOGIC:  Acute post-operative anemia - Worsened -- Downtrending Hgb. Monitor CBC. Transfuse PRBC as needed. Monitor chest tube output.  DVT prophylaxis with heparin subQ and SCDs.           INFECTIOUS DISEASE:  All surgical sites appear clean. Will monitor for fever and leukocytosis.       ENDOCRINE:  Stable – Monitor glucose fingersticks for goal 120-180.            Pertinent clinical, laboratory, radiographic, hemodynamic, echocardiographic, respiratory data, microbiologic data and chart were reviewed by myself and analyzed frequently throughout the course of the day and night by myself.    Plan discussed at length with the CTICU staff and Attending CT Surgeon -   Dr Anh Goodman.     _________________________  VITAL SIGNS:  Vital Signs Last 24 Hrs  T(C): 36.9 (06 Aug 2021 12:00), Max: 37.4 (06 Aug 2021 04:00)  T(F): 98.5 (06 Aug 2021 12:00), Max: 99.4 (06 Aug 2021 04:00)  HR: 60 (06 Aug 2021 12:00) (55 - 83)  BP: --  BP(mean): --  RR: 15 (06 Aug 2021 12:00) (8 - 18)  SpO2: 100% (06 Aug 2021 12:00) (94% - 100%)  I/Os:   I&O's Detail    05 Aug 2021 07:01  -  06 Aug 2021 07:00  --------------------------------------------------------  IN:    dextrose 5% + sodium chloride 0.45% w/ Additives: 900 mL    Enteral Tube Flush: 150 mL    Enteral Tube Flush: 150 mL    Lactated Ringers: 800 mL  Total IN: 2000 mL    OUT:    Chest Tube (mL): 230 mL    Chest Tube (mL): 30 mL    Indwelling Catheter - Urethral (mL): 100 mL    Jejunostomy Tube (mL): 150 mL    Nasogastric/Oral tube (mL): 400 mL    Voided (mL): 1150 mL  Total OUT: 2060 mL    Total NET: -60 mL      06 Aug 2021 07:01  -  06 Aug 2021 12:31  --------------------------------------------------------  IN:    dextrose 5% + sodium chloride 0.45% w/ Additives: 150 mL    dextrose 5% + sodium chloride 0.9%: 150 mL    Enteral Tube Flush: 60 mL    Enteral Tube Flush: 60 mL    IV PiggyBack: 100 mL  Total IN: 520 mL    OUT:    Chest Tube (mL): 50 mL    Chest Tube (mL): 90 mL    Jejunostomy Tube (mL): 30 mL    Voided (mL): 800 mL  Total OUT: 970 mL    Total NET: -450 mL              MEDICATIONS:  MEDICATIONS  (STANDING):  acetaminophen  IVPB .. 1000 milliGRAM(s) IV Intermittent once  ALBUTerol    0.083% 2.5 milliGRAM(s) Nebulizer every 6 hours  dextrose 5% + sodium chloride 0.9%. 1000 milliLiter(s) (50 mL/Hr) IV Continuous <Continuous>  dornase duarte Solution 2.5 milliGRAM(s) Inhalation two times a day  heparin   Injectable 5000 Unit(s) SubCutaneous every 8 hours  HYDROmorphone PCA (1 mG/mL) 30 milliLiter(s) PCA Continuous PCA Continuous  pantoprazole  Injectable 40 milliGRAM(s) IV Push daily  sodium chloride 3%  Inhalation 4 milliLiter(s) Inhalation every 6 hours    MEDICATIONS  (PRN):  HYDROmorphone PCA (1 mG/mL) Rescue Clinician Bolus 0.5 milliGRAM(s) IV Push every 15 minutes PRN for Pain Scale GREATER THAN 6  naloxone Injectable 0.1 milliGRAM(s) IV Push every 3 minutes PRN For ANY of the following changes in patient status:  A. RR LESS THAN 10 breaths per minute, B. Oxygen saturation LESS THAN 90%, C. Sedation score of 6  ondansetron Injectable 4 milliGRAM(s) IV Push every 6 hours PRN Nausea      LABS:  Laboratory data was independently reviewed by me today.                           10.9   14.95 )-----------( 132      ( 06 Aug 2021 02:46 )             33.9     08-06    134<L>  |  101  |  15  ----------------------------<  101<H>  4.2   |  24  |  0.92    Ca    8.6      06 Aug 2021 02:46  Phos  2.0     08-06  Mg     1.90     08-06    TPro  5.7<L>  /  Alb  3.2<L>  /  TBili  1.1  /  DBili  x   /  AST  94<H>  /  ALT  130<H>  /  AlkPhos  59  08-06    LIVER FUNCTIONS - ( 06 Aug 2021 02:46 )  Alb: 3.2 g/dL / Pro: 5.7 g/dL / ALK PHOS: 59 U/L / ALT: 130 U/L / AST: 94 U/L / GGT: x             ABG - ( 04 Aug 2021 14:53 )  pH, Arterial: 7.43  pH, Blood: x     /  pCO2: 40    /  pO2: 134   / HCO3: 26    / Base Excess: 2.0   /  SaO2: 99.2                  RADIOLOGY:   Radiology images were independently reviewed by me today. Reports were reviewed by me today.    Xray Chest 1 View- PORTABLE-Routine:   EXAM:  XR CHEST PORTABLE ROUTINE 1V        PROCEDURE DATE:  Aug  5 2021         INTERPRETATION:  TIME OF EXAM: August 5, 2021 at 5:15 AM    CLINICAL INFORMATION: Follow-up post esophagectomy.    TECHNIQUE:   Portable chest    INTERPRETATION:    Enteric tube is seen with tip at level of the cardioesophageal junction. 2 chest tubes is seen. Postop pneumoperitoneum.    Lungs are clear, heart is not enlarged and there is no effusions.      COMPARISON:  August 4      IMPRESSION:  Status post esophagectomy with chest tubes and postop pneumoperitoneum but clear lungs.    --- End of Report ---              ALONDRA HOLLEY MD; Attending Radiologist  This document has been electronically signed. Aug  5 2021 11:12AM (08-05-21 @ 07:21)  Xray Chest 1 View AP/PA:   EXAM:  XR CHEST AP OR PA 1V        PROCEDURE DATE:  Aug  4 2021         INTERPRETATION:  Chest one view    HISTORY: Postop    COMPARISON STUDY: 7/30/2021    Frontal expiratory view of the chest shows the heart to be normal in size. Endotracheal tube is present. Nasogastric tube reaches the level of the lower esophagus. The lungs are clear and there is no evidence of pneumothorax nor pleural effusion.    IMPRESSION:  No active pulmonary disease.    Thank you for the courtesy of this referral.    --- End of Report ---              JACEK LUX MD; Attending Interventional Radiologist  This document has been electronically signed. Aug  4 2021  1:24PM (08-04-21 @ 06:31)        Patient's status was discussed with patient at bedside.           ________________________________________________           CHIEF COMPLAINT: FOLLOW UP IN ICU FOR POSTOPERATIVE CARE OF PATIENT WHO IS S/P Three hole Esophagogastrectomy      PROCEDURES: R robotic-assisted VATS mobilization of esophagus, robotic-assisted laparoscopic converted to open [2/2 dense adhesions of celiac trunk / lesser curve of stomach to liver], mobilization of gastric conduit, pyloroplasty. Neck incision for completion of proximal anastomosis.  Placement of jejunostomy tube.  03-Aug-2021         ISSUES:   Acute post operative anemia  Achalasia  Post op pain  Chest tube in place  Hyponatremia    INTERVAL EVENTS:   Chest tube with no airleak. Chest tube to be discontinued today.      HISTORY:   Patient reports moderate pain at abdominal incision sites which is worse with coughing and deep breathing without associated fever or dyspnea. Pain is improved with use of pain meds.     PHYSICAL EXAM:   Gen: Comfortable, No acute distress  Eyes: Sclera white, Conjunctiva normal, Eyelids normal, Pupils symmetrical   ENT: Mucous membranes moist,  ,  ,  NG tube in place. Neck incision without erythema or drainage.  Neck: Trachea midline,  ,  ,  ,  ,    CV: Rate regular, Rhythm regular,  ,  ,    Resp: Breath sounds clear, No accessory muscles use, Two R chest tube in place,  ,    Abd: Soft, Non-distended, Non-tender, Bowel sounds normal,  ,  ,  Feeding tube in place  Skin: Warm, No peripheral edema of lower extremities,  ,    : No stearns  Neuro: Moving all 4 extremities,    Psych: A&Ox3      ASSESSMENT AND PLAN:     NEURO:  Post-operative Pain - Stable. Pain control with PCA and Tylenol IV PRN.             RESPIRATORY:  Hypoxia - Wean nasal cannula for goal O2sat above 92. Obtain CXR. Incentive spirometry. Chest PT and frequent suctioning. Continue bronchodilators. OOB to chair & ambulate w/ assistance. Continuous pulse oximetry for support & to prevent decompensation.       Drain - Monitor bulb output.          CARDIOVASCULAR:  Hemodynamically stable - Not on pressors. Continue hemodynamic monitoring.  Telemetry (medical test) - Reviewed by me today independently. Normal sinus rhythm.        RENAL:  Stable - Monitor IOs and electrolytes. Keep K above 4.0 and Mg above 2.0.     Hyponatremia - stable. combination hypovolemia with possible SIADH. Changed to NS IVF. Monitor Na.    GASTROINTESTINAL:  GI prophylaxis not indicated  Zofran and Reglan IV PRN for nausea  NPO until swallow study  J tube tube feeds  NG tube to low suction    Achalasia - s/p esophagectomy. Awaiting swallow study. Continue NG tube to low suction.    HEMATOLOGIC:  Acute post-operative anemia - Worsened -- Downtrending Hgb. Monitor CBC. Transfuse PRBC as needed. Monitor chest tube output.  DVT prophylaxis with heparin subQ and SCDs.           INFECTIOUS DISEASE:  All surgical sites appear clean. Will monitor for fever and leukocytosis.       ENDOCRINE:  Stable – Monitor glucose fingersticks for goal 120-180.            Pertinent clinical, laboratory, radiographic, hemodynamic, echocardiographic, respiratory data, microbiologic data and chart were reviewed by myself and analyzed frequently throughout the course of the day and night by myself.    Plan discussed at length with the CTICU staff and Attending CT Surgeon -   Dr Anh Goodman.     _________________________  VITAL SIGNS:  Vital Signs Last 24 Hrs  T(C): 36.9 (06 Aug 2021 12:00), Max: 37.4 (06 Aug 2021 04:00)  T(F): 98.5 (06 Aug 2021 12:00), Max: 99.4 (06 Aug 2021 04:00)  HR: 60 (06 Aug 2021 12:00) (55 - 83)  BP: --  BP(mean): --  RR: 15 (06 Aug 2021 12:00) (8 - 18)  SpO2: 100% (06 Aug 2021 12:00) (94% - 100%)  I/Os:   I&O's Detail    05 Aug 2021 07:01  -  06 Aug 2021 07:00  --------------------------------------------------------  IN:    dextrose 5% + sodium chloride 0.45% w/ Additives: 900 mL    Enteral Tube Flush: 150 mL    Enteral Tube Flush: 150 mL    Lactated Ringers: 800 mL  Total IN: 2000 mL    OUT:    Chest Tube (mL): 230 mL    Chest Tube (mL): 30 mL    Indwelling Catheter - Urethral (mL): 100 mL    Jejunostomy Tube (mL): 150 mL    Nasogastric/Oral tube (mL): 400 mL    Voided (mL): 1150 mL  Total OUT: 2060 mL    Total NET: -60 mL      06 Aug 2021 07:01  -  06 Aug 2021 12:31  --------------------------------------------------------  IN:    dextrose 5% + sodium chloride 0.45% w/ Additives: 150 mL    dextrose 5% + sodium chloride 0.9%: 150 mL    Enteral Tube Flush: 60 mL    Enteral Tube Flush: 60 mL    IV PiggyBack: 100 mL  Total IN: 520 mL    OUT:    Chest Tube (mL): 50 mL    Chest Tube (mL): 90 mL    Jejunostomy Tube (mL): 30 mL    Voided (mL): 800 mL  Total OUT: 970 mL    Total NET: -450 mL              MEDICATIONS:  MEDICATIONS  (STANDING):  acetaminophen  IVPB .. 1000 milliGRAM(s) IV Intermittent once  ALBUTerol    0.083% 2.5 milliGRAM(s) Nebulizer every 6 hours  dextrose 5% + sodium chloride 0.9%. 1000 milliLiter(s) (50 mL/Hr) IV Continuous <Continuous>  dornase duarte Solution 2.5 milliGRAM(s) Inhalation two times a day  heparin   Injectable 5000 Unit(s) SubCutaneous every 8 hours  HYDROmorphone PCA (1 mG/mL) 30 milliLiter(s) PCA Continuous PCA Continuous  pantoprazole  Injectable 40 milliGRAM(s) IV Push daily  sodium chloride 3%  Inhalation 4 milliLiter(s) Inhalation every 6 hours    MEDICATIONS  (PRN):  HYDROmorphone PCA (1 mG/mL) Rescue Clinician Bolus 0.5 milliGRAM(s) IV Push every 15 minutes PRN for Pain Scale GREATER THAN 6  naloxone Injectable 0.1 milliGRAM(s) IV Push every 3 minutes PRN For ANY of the following changes in patient status:  A. RR LESS THAN 10 breaths per minute, B. Oxygen saturation LESS THAN 90%, C. Sedation score of 6  ondansetron Injectable 4 milliGRAM(s) IV Push every 6 hours PRN Nausea      LABS:  Laboratory data was independently reviewed by me today.                           10.9   14.95 )-----------( 132      ( 06 Aug 2021 02:46 )             33.9     08-06    134<L>  |  101  |  15  ----------------------------<  101<H>  4.2   |  24  |  0.92    Ca    8.6      06 Aug 2021 02:46  Phos  2.0     08-06  Mg     1.90     08-06    TPro  5.7<L>  /  Alb  3.2<L>  /  TBili  1.1  /  DBili  x   /  AST  94<H>  /  ALT  130<H>  /  AlkPhos  59  08-06    LIVER FUNCTIONS - ( 06 Aug 2021 02:46 )  Alb: 3.2 g/dL / Pro: 5.7 g/dL / ALK PHOS: 59 U/L / ALT: 130 U/L / AST: 94 U/L / GGT: x             ABG - ( 04 Aug 2021 14:53 )  pH, Arterial: 7.43  pH, Blood: x     /  pCO2: 40    /  pO2: 134   / HCO3: 26    / Base Excess: 2.0   /  SaO2: 99.2                  RADIOLOGY:   Radiology images were independently reviewed by me today. Reports were reviewed by me today.    Xray Chest 1 View- PORTABLE-Routine:   EXAM:  XR CHEST PORTABLE ROUTINE 1V        PROCEDURE DATE:  Aug  5 2021         INTERPRETATION:  TIME OF EXAM: August 5, 2021 at 5:15 AM    CLINICAL INFORMATION: Follow-up post esophagectomy.    TECHNIQUE:   Portable chest    INTERPRETATION:    Enteric tube is seen with tip at level of the cardioesophageal junction. 2 chest tubes is seen. Postop pneumoperitoneum.    Lungs are clear, heart is not enlarged and there is no effusions.      COMPARISON:  August 4      IMPRESSION:  Status post esophagectomy with chest tubes and postop pneumoperitoneum but clear lungs.    --- End of Report ---              ALONDRA HOLLEY MD; Attending Radiologist  This document has been electronically signed. Aug  5 2021 11:12AM (08-05-21 @ 07:21)  Xray Chest 1 View AP/PA:   EXAM:  XR CHEST AP OR PA 1V        PROCEDURE DATE:  Aug  4 2021         INTERPRETATION:  Chest one view    HISTORY: Postop    COMPARISON STUDY: 7/30/2021    Frontal expiratory view of the chest shows the heart to be normal in size. Endotracheal tube is present. Nasogastric tube reaches the level of the lower esophagus. The lungs are clear and there is no evidence of pneumothorax nor pleural effusion.    IMPRESSION:  No active pulmonary disease.    Thank you for the courtesy of this referral.    --- End of Report ---              JACEK LUX MD; Attending Interventional Radiologist  This document has been electronically signed. Aug  4 2021  1:24PM (08-04-21 @ 06:31)        Patient's status was discussed with patient at bedside.           ________________________________________________

## 2021-08-06 NOTE — CHART NOTE - NSCHARTNOTEFT_GEN_A_CORE
Mr. Tomeka Van is a 49yo M with h/o achalasia and 30lbs weight loss . Pt had a 3 hole esophagectomy, j-tub placement. Pt will remain NPO post operatively with tube feeds via J tube as only source of nutritional support. All oral intake for nutrition is completely contraindicated at this time due to high risk of esophageal leak, perforation and aspiration. There are no other alternatives. The need is absolute. Failure of pt to have tube feeds for nutrition can lead to several complications, prolonged healing, prolonged hospitalization and potentially death. Length of need will be at least 3 months but could be life long. Feeds must be given continuously on pump, cannot be bolus feeds due to jejunostomy tube. Mr. Mason Tompkins is a 51yo M with h/o achalasia and 30lbs weight loss . Pt had a 3 hole esophagectomy, j-tub placement. Pt will remain NPO post operatively with tube feeds via J tube as only source of nutritional support. All oral intake for nutrition is completely contraindicated at this time due to high risk of esophageal leak, perforation and aspiration. There are no other alternatives. The need is absolute. Failure of pt to have tube feeds for nutrition can lead to several complications, prolonged healing, prolonged hospitalization and potentially death. Length of need will be at least 3 months but could be life long. Feeds must be given continuously on pump, cannot be bolus feeds due to jejunostomy tube. Mr. Mason Tompkins is a 51yo M with h/o achalasia and 30lbs weight loss . Pt had a 3 hole esophagectomy, j-tube placement. Pt will remain NPO post operatively with tube feeds via J tube as only source of nutritional support. All oral intake for nutrition is completely contraindicated at this time due to high risk of esophageal leak, perforation and aspiration. There are no other alternatives. The need is absolute. Failure of pt to have tube feeds for nutrition can lead to several complications, prolonged healing, prolonged hospitalization and potentially death. Length of need will be at least 3 months but could be life long. Feeds must be given continuously on pump, cannot be bolus feeds due to jejunostomy tube.

## 2021-08-06 NOTE — PROGRESS NOTE ADULT - SUBJECTIVE AND OBJECTIVE BOX
Anesthesia Pain Management Service    SUBJECTIVE: Patient is doing well with IV PCA and no significant problems reported.    Pain Scale Score	At rest: _3__ 	With Activity: ___ 	[X ] Refer to charted pain scores    THERAPY:    [ ] IV PCA Morphine		[ ] 5 mg/mL	[ ] 1 mg/mL  [X ] IV PCA Hydromorphone	[ ] 5 mg/mL	[X ] 1 mg/mL  [ ] IV PCA Fentanyl		[ ] 50 micrograms/mL    Demand dose __0.2_ lockout __6_ (minutes) Continuous Rate _0__ Total: _12.9__  mg used (in past 24 hours)      MEDICATIONS  (STANDING):  acetaminophen  IVPB .. 1000 milliGRAM(s) IV Intermittent once  ALBUTerol    0.083% 2.5 milliGRAM(s) Nebulizer every 6 hours  dextrose 5% + sodium chloride 0.9%. 1000 milliLiter(s) (50 mL/Hr) IV Continuous <Continuous>  dornase duarte Solution 2.5 milliGRAM(s) Inhalation two times a day  heparin   Injectable 5000 Unit(s) SubCutaneous every 8 hours  HYDROmorphone PCA (1 mG/mL) 30 milliLiter(s) PCA Continuous PCA Continuous  pantoprazole  Injectable 40 milliGRAM(s) IV Push daily  sodium chloride 3%  Inhalation 4 milliLiter(s) Inhalation every 6 hours    MEDICATIONS  (PRN):  HYDROmorphone PCA (1 mG/mL) Rescue Clinician Bolus 0.5 milliGRAM(s) IV Push every 15 minutes PRN for Pain Scale GREATER THAN 6  naloxone Injectable 0.1 milliGRAM(s) IV Push every 3 minutes PRN For ANY of the following changes in patient status:  A. RR LESS THAN 10 breaths per minute, B. Oxygen saturation LESS THAN 90%, C. Sedation score of 6  ondansetron Injectable 4 milliGRAM(s) IV Push every 6 hours PRN Nausea      OBJECTIVE: laying in bed     Sedation Score:	[ X] Alert	[ ] Drowsy 	[ ] Arousable	[ ] Asleep	[ ] Unresponsive    Side Effects:	[X ] None	[ ] Nausea	[ ] Vomiting	[ ] Pruritus  		[ ] Other:    Vital Signs Last 24 Hrs  T(C): 36.7 (06 Aug 2021 08:00), Max: 37.4 (06 Aug 2021 04:00)  T(F): 98 (06 Aug 2021 08:00), Max: 99.4 (06 Aug 2021 04:00)  HR: 64 (06 Aug 2021 10:00) (55 - 83)  BP: --  BP(mean): --  RR: 18 (06 Aug 2021 10:00) (8 - 18)  SpO2: 97% (06 Aug 2021 10:00) (94% - 100%)    ASSESSMENT/ PLAN    Therapy to  be:	[ X] Continue   [ ] Discontinued   [ ] Change to prn Analgesics    Documentation and Verification of current medications:   [X] Done	[ ] Not done, not elligible    Comments: Recommend non-opioid adjuvant analgesics to be used when possible and when allowed by primary surgical team.    Progress Note written now but Patient was seen earlier.

## 2021-08-07 LAB
ANION GAP SERPL CALC-SCNC: 11 MMOL/L — SIGNIFICANT CHANGE UP (ref 7–14)
APTT BLD: 29.2 SEC — SIGNIFICANT CHANGE UP (ref 27–36.3)
BLD GP AB SCN SERPL QL: NEGATIVE — SIGNIFICANT CHANGE UP
BUN SERPL-MCNC: 9 MG/DL — SIGNIFICANT CHANGE UP (ref 7–23)
CALCIUM SERPL-MCNC: 8.7 MG/DL — SIGNIFICANT CHANGE UP (ref 8.4–10.5)
CHLORIDE SERPL-SCNC: 100 MMOL/L — SIGNIFICANT CHANGE UP (ref 98–107)
CO2 SERPL-SCNC: 25 MMOL/L — SIGNIFICANT CHANGE UP (ref 22–31)
CREAT SERPL-MCNC: 0.81 MG/DL — SIGNIFICANT CHANGE UP (ref 0.5–1.3)
GLUCOSE SERPL-MCNC: 112 MG/DL — HIGH (ref 70–99)
HCT VFR BLD CALC: 29 % — LOW (ref 39–50)
HCT VFR BLD CALC: 32.2 % — LOW (ref 39–50)
HGB BLD-MCNC: 10.5 G/DL — LOW (ref 13–17)
HGB BLD-MCNC: 9.3 G/DL — LOW (ref 13–17)
INR BLD: 1.18 RATIO — HIGH (ref 0.88–1.16)
MAGNESIUM SERPL-MCNC: 1.8 MG/DL — SIGNIFICANT CHANGE UP (ref 1.6–2.6)
MCHC RBC-ENTMCNC: 29.4 PG — SIGNIFICANT CHANGE UP (ref 27–34)
MCHC RBC-ENTMCNC: 29.6 PG — SIGNIFICANT CHANGE UP (ref 27–34)
MCHC RBC-ENTMCNC: 32.1 GM/DL — SIGNIFICANT CHANGE UP (ref 32–36)
MCHC RBC-ENTMCNC: 32.6 GM/DL — SIGNIFICANT CHANGE UP (ref 32–36)
MCV RBC AUTO: 90.2 FL — SIGNIFICANT CHANGE UP (ref 80–100)
MCV RBC AUTO: 92.4 FL — SIGNIFICANT CHANGE UP (ref 80–100)
NRBC # BLD: 0 /100 WBCS — SIGNIFICANT CHANGE UP
NRBC # BLD: 0 /100 WBCS — SIGNIFICANT CHANGE UP
NRBC # FLD: 0 K/UL — SIGNIFICANT CHANGE UP
NRBC # FLD: 0 K/UL — SIGNIFICANT CHANGE UP
PHOSPHATE SERPL-MCNC: 2.2 MG/DL — LOW (ref 2.5–4.5)
PLATELET # BLD AUTO: 102 K/UL — LOW (ref 150–400)
PLATELET # BLD AUTO: 138 K/UL — LOW (ref 150–400)
POTASSIUM SERPL-MCNC: 4.1 MMOL/L — SIGNIFICANT CHANGE UP (ref 3.5–5.3)
POTASSIUM SERPL-SCNC: 4.1 MMOL/L — SIGNIFICANT CHANGE UP (ref 3.5–5.3)
PROTHROM AB SERPL-ACNC: 13.5 SEC — SIGNIFICANT CHANGE UP (ref 10.6–13.6)
RBC # BLD: 3.14 M/UL — LOW (ref 4.2–5.8)
RBC # BLD: 3.57 M/UL — LOW (ref 4.2–5.8)
RBC # FLD: 12.2 % — SIGNIFICANT CHANGE UP (ref 10.3–14.5)
RBC # FLD: 12.5 % — SIGNIFICANT CHANGE UP (ref 10.3–14.5)
RH IG SCN BLD-IMP: POSITIVE — SIGNIFICANT CHANGE UP
SODIUM SERPL-SCNC: 136 MMOL/L — SIGNIFICANT CHANGE UP (ref 135–145)
WBC # BLD: 13.88 K/UL — HIGH (ref 3.8–10.5)
WBC # BLD: 15.16 K/UL — HIGH (ref 3.8–10.5)
WBC # FLD AUTO: 13.88 K/UL — HIGH (ref 3.8–10.5)
WBC # FLD AUTO: 15.16 K/UL — HIGH (ref 3.8–10.5)

## 2021-08-07 PROCEDURE — 99233 SBSQ HOSP IP/OBS HIGH 50: CPT

## 2021-08-07 PROCEDURE — 71045 X-RAY EXAM CHEST 1 VIEW: CPT | Mod: 26

## 2021-08-07 RX ORDER — ACETAMINOPHEN 500 MG
1000 TABLET ORAL ONCE
Refills: 0 | Status: COMPLETED | OUTPATIENT
Start: 2021-08-07 | End: 2021-08-07

## 2021-08-07 RX ORDER — MAGNESIUM SULFATE 500 MG/ML
2 VIAL (ML) INJECTION ONCE
Refills: 0 | Status: COMPLETED | OUTPATIENT
Start: 2021-08-07 | End: 2021-08-07

## 2021-08-07 RX ORDER — LIDOCAINE 4 G/100G
1 CREAM TOPICAL EVERY 24 HOURS
Refills: 0 | Status: DISCONTINUED | OUTPATIENT
Start: 2021-08-07 | End: 2021-08-09

## 2021-08-07 RX ORDER — ACETAMINOPHEN 500 MG
1000 TABLET ORAL ONCE
Refills: 0 | Status: DISCONTINUED | OUTPATIENT
Start: 2021-08-08 | End: 2021-08-10

## 2021-08-07 RX ORDER — LIDOCAINE 4 G/100G
1 CREAM TOPICAL EVERY 24 HOURS
Refills: 0 | Status: COMPLETED | OUTPATIENT
Start: 2021-08-07 | End: 2021-08-11

## 2021-08-07 RX ADMIN — DORNASE ALFA 2.5 MILLIGRAM(S): 1 SOLUTION RESPIRATORY (INHALATION) at 21:19

## 2021-08-07 RX ADMIN — HEPARIN SODIUM 5000 UNIT(S): 5000 INJECTION INTRAVENOUS; SUBCUTANEOUS at 14:00

## 2021-08-07 RX ADMIN — SODIUM CHLORIDE 4 MILLILITER(S): 9 INJECTION INTRAMUSCULAR; INTRAVENOUS; SUBCUTANEOUS at 21:19

## 2021-08-07 RX ADMIN — SODIUM CHLORIDE 4 MILLILITER(S): 9 INJECTION INTRAMUSCULAR; INTRAVENOUS; SUBCUTANEOUS at 09:54

## 2021-08-07 RX ADMIN — PANTOPRAZOLE SODIUM 40 MILLIGRAM(S): 20 TABLET, DELAYED RELEASE ORAL at 14:30

## 2021-08-07 RX ADMIN — Medication 1000 MILLIGRAM(S): at 21:36

## 2021-08-07 RX ADMIN — ALBUTEROL 2.5 MILLIGRAM(S): 90 AEROSOL, METERED ORAL at 21:18

## 2021-08-07 RX ADMIN — ALBUTEROL 2.5 MILLIGRAM(S): 90 AEROSOL, METERED ORAL at 03:49

## 2021-08-07 RX ADMIN — Medication 85 MILLIMOLE(S): at 09:00

## 2021-08-07 RX ADMIN — HYDROMORPHONE HYDROCHLORIDE 30 MILLILITER(S): 2 INJECTION INTRAMUSCULAR; INTRAVENOUS; SUBCUTANEOUS at 19:22

## 2021-08-07 RX ADMIN — ALBUTEROL 2.5 MILLIGRAM(S): 90 AEROSOL, METERED ORAL at 16:28

## 2021-08-07 RX ADMIN — HEPARIN SODIUM 5000 UNIT(S): 5000 INJECTION INTRAVENOUS; SUBCUTANEOUS at 05:07

## 2021-08-07 RX ADMIN — SODIUM CHLORIDE 4 MILLILITER(S): 9 INJECTION INTRAMUSCULAR; INTRAVENOUS; SUBCUTANEOUS at 03:51

## 2021-08-07 RX ADMIN — ALBUTEROL 2.5 MILLIGRAM(S): 90 AEROSOL, METERED ORAL at 09:33

## 2021-08-07 RX ADMIN — SODIUM CHLORIDE 4 MILLILITER(S): 9 INJECTION INTRAMUSCULAR; INTRAVENOUS; SUBCUTANEOUS at 16:29

## 2021-08-07 RX ADMIN — HEPARIN SODIUM 5000 UNIT(S): 5000 INJECTION INTRAVENOUS; SUBCUTANEOUS at 21:06

## 2021-08-07 RX ADMIN — Medication 400 MILLIGRAM(S): at 21:06

## 2021-08-07 RX ADMIN — DORNASE ALFA 2.5 MILLIGRAM(S): 1 SOLUTION RESPIRATORY (INHALATION) at 09:45

## 2021-08-07 RX ADMIN — Medication 50 GRAM(S): at 09:00

## 2021-08-07 NOTE — PROGRESS NOTE ADULT - SUBJECTIVE AND OBJECTIVE BOX
DONNA PRATEEK      50y   Male   [id number]         No Known Allergies             Daily     Daily Drug Dosing Weight  Height (cm): 177.8 (03 Aug 2021 06:27)  Weight (kg): 68 (03 Aug 2021 06:27)  BMI (kg/m2): 21.5 (03 Aug 2021 06:27)  BSA (m2): 1.85 (03 Aug 2021 06:27)    50 year old male with  PMH of esophageal dysfunction  presents to Presurgical testing with diagnosis of achalasia of cardia scheduled for upper endoscopy robotic assisted right video assisted laparoscopic neck incision, three holes esophagectomy , J tube placement  Patient with reports that he always feels that his food is stuck in his chest and not going down. States that he had several endoscopic procedures but no relief  (22 Jul 2021 10:56)      Procedure:  Flex bronch, R robotic-assisted VATS mobilization of esophagus, robotic-assisted laparoscopic converted to open [2/2 dense adhesions of celiac trunk / lesser curve of stomach to liver], mobilization of gastric conduit, pyloroplasty. Neck incision for completion of proximal anastomosis.  Placement of jejunostomy tube 7/3/2021      Issues:              Achalasia              Postop pain  Chest tube in place  Vocal cord paralysis               Home Medications:  none as per patient:  (22 Jul 2021 11:00)      PAST MEDICAL & SURGICAL HISTORY:  Achalasia    Shoulder injury  left    History of esophagogastroduodenoscopy (EGD)      Vital Signs Last 24 Hrs  T(C): 37.3 (07 Aug 2021 08:00), Max: 37.7 (06 Aug 2021 20:00)  T(F): 99.1 (07 Aug 2021 08:00), Max: 99.8 (06 Aug 2021 20:00)  HR: 79 (07 Aug 2021 10:00) (59 - 86)  BP: 148/96 (07 Aug 2021 10:00) (123/73 - 152/96)  BP(mean): 110 (07 Aug 2021 10:00) (89 - 114)  RR: 16 (07 Aug 2021 10:00) (10 - 21)  SpO2: 99% (07 Aug 2021 10:00) (96% - 100%)  I&O's Detail    06 Aug 2021 07:01  -  07 Aug 2021 07:00  --------------------------------------------------------  IN:    dextrose 5% + sodium chloride 0.45% w/ Additives: 150 mL    dextrose 5% + sodium chloride 0.9%: 1100 mL    Enteral Tube Flush: 180 mL    Enteral Tube Flush: 180 mL    IV PiggyBack: 100 mL    TwoCal HN: 160 mL  Total IN: 1870 mL    OUT:    Chest Tube (mL): 50 mL    Chest Tube (mL): 300 mL    Jejunostomy Tube (mL): 30 mL    Nasogastric/Oral tube (mL): 500 mL    Voided (mL): 2500 mL  Total OUT: 3380 mL    Total NET: -1510 mL      07 Aug 2021 07:01  -  07 Aug 2021 10:06  --------------------------------------------------------  IN:    dextrose 5% + sodium chloride 0.9%: 150 mL    Enteral Tube Flush: 30 mL    Enteral Tube Flush: 30 mL    TwoCal HN: 20 mL  Total IN: 230 mL    OUT:    Voided (mL): 350 mL  Total OUT: 350 mL    Total NET: -120 mL        CAPILLARY BLOOD GLUCOSE      POCT Blood Glucose.: 96 mg/dL (06 Aug 2021 23:55)  POCT Blood Glucose.: 94 mg/dL (06 Aug 2021 12:43)    CAPILLARY BLOOD GLUCOSE      POCT Blood Glucose.: 96 mg/dL (06 Aug 2021 23:55)      Home Medications:  none as per patient:  (22 Jul 2021 11:00)      MEDICATIONS  (STANDING):  acetaminophen  IVPB .. 1000 milliGRAM(s) IV Intermittent once  ALBUTerol    0.083% 2.5 milliGRAM(s) Nebulizer every 6 hours  dextrose 5% + sodium chloride 0.9%. 1000 milliLiter(s) (50 mL/Hr) IV Continuous <Continuous>  dornase duarte Solution 2.5 milliGRAM(s) Inhalation two times a day  heparin   Injectable 5000 Unit(s) SubCutaneous every 8 hours  HYDROmorphone PCA (1 mG/mL) 30 milliLiter(s) PCA Continuous PCA Continuous  lidocaine   4% Patch 1 Patch Transdermal every 24 hours  magnesium sulfate  IVPB 2 Gram(s) IV Intermittent once  pantoprazole  Injectable 40 milliGRAM(s) IV Push daily  sodium chloride 3%  Inhalation 4 milliLiter(s) Inhalation every 6 hours  sodium phosphate IVPB 30 milliMole(s) IV Intermittent once    MEDICATIONS  (PRN):  HYDROmorphone PCA (1 mG/mL) Rescue Clinician Bolus 0.5 milliGRAM(s) IV Push every 15 minutes PRN for Pain Scale GREATER THAN 6  naloxone Injectable 0.1 milliGRAM(s) IV Push every 3 minutes PRN For ANY of the following changes in patient status:  A. RR LESS THAN 10 breaths per minute, B. Oxygen saturation LESS THAN 90%, C. Sedation score of 6  ondansetron Injectable 4 milliGRAM(s) IV Push every 6 hours PRN Nausea          Physical exam:   General:               Pt is awake, alert, not in any distress                                                  Neuro:                  Nonfocal                             Cardiovascular:   S1 & S2, regular                           Respiratory:         Air entry is fair and equal on both sides, has bilateral conducted sounds                           GI:                          Soft, nondistended and nontender, Bowel sounds absent                            Ext:                        No cyanosis or edema                              Labs:                                                                           10.5   15.16 )-----------( 138      ( 07 Aug 2021 06:54 )             32.2             08-07    136  |  100  |  9   ----------------------------<  112<H>  4.1   |  25  |  0.81    Ca    8.7      07 Aug 2021 02:49  Phos  2.2     08-07  Mg     1.80     08-07    TPro  5.7<L>  /  Alb  3.2<L>  /  TBili  1.1  /  DBili  x   /  AST  94<H>  /  ALT  130<H>  /  AlkPhos  59  08-06                  PT/INR - ( 07 Aug 2021 06:54 )   PT: 13.5 sec;   INR: 1.18 ratio         PTT - ( 07 Aug 2021 06:54 )  PTT:29.2 sec  LIVER FUNCTIONS - ( 06 Aug 2021 02:46 )  Alb: 3.2 g/dL / Pro: 5.7 g/dL / ALK PHOS: 59 U/L / ALT: 130 U/L / AST: 94 U/L / GGT: x             CXR:    < from: Xray Chest 1 View- PORTABLE-Routine (08.07.21 @ 05:16) >  Enteric tube with tip just past the esophagogastric junction. Right-sided chest tube in place. Prominent right paraspinal lucency likely air distended esophagus or gastric pull-through. Small left effusion with underlying atelectasis again seen. No pneumothorax.      COMPARISON:  August 6 without significant change.      IMPRESSION:  Follow-up post esophageal surgery with gaseous dilatation likely secondary to gastric pull-through.      Plan:    General: 50yMale s/p Flex bronch, R robotic-assisted VATS mobilization of esophagus, robotic-assisted laparoscopic converted to open [2/2 dense adhesions of celiac trunk / lesser curve of stomach to liver], mobilization of gastric conduit, pyloroplasty. Neck incision for completion of proximal anastomosis.  Placement of jejunostomy tube 7/3/2021. Remained intubated and on vent support.                             Neuro:                                          Pain control with Dilaudid  PCA      R-vocal fold paresis vs. paralyzed - Continue NPO status  ENT f/u   Cine study before Barium swallow on Monday                            Cardiovascular:                                           Continue hemodynamic monitoring.                                         Continue IVF D5LR 50cc/hr                            Respiratory:                                          Pt is on NC, looks comfortable      Chest tube D/CD. Monitor Donell output                                                               Continue bronchodilators, pulmonary toilet                            GI                                          NPO with tube feeds                                          Continue GI prophylaxis with  Protonix                                          Continue Zofran / Reglan for nausea - PRN                                          NGT to low continuous wall suction                                           Flush both NGT and J-tube with 20cc of sterile water Q 4hrs.        Tolerating J-tube feeds, Goal 2CalHN @36cc/hr	                                                                 Renal:                                          Continue D5LR 50cc/hr                                          Monitor I/Os and electrolytes                                                                                         Hem/ Onc:                                                                                   Monitor chest tube output &  signs of bleeding.                                           Follow CBC in AM                           Infectious disease:                                             Monitor for fever / leukocytosis.                                           All surgical incision / chest tube  sites look clean                            Endocrine                                              Continue Accu-Checks with coverage    Pt is on SQ Heparin and Venodyne boots for DVT prophylaxis.     Pertinent clinical, laboratory, radiographic, hemodynamic, echocardiographic, respiratory data, microbiologic data and chart were reviewed and analyzed frequently throughout the course of the day and night  Patient seen, examined and plan discussed with CT Surgeon Dr. Goodman  / CTICU team during rounds.    Status discussed with patient  / family at bedside, updated plan of care            Austin Escobar MD

## 2021-08-07 NOTE — PROGRESS NOTE ADULT - SUBJECTIVE AND OBJECTIVE BOX
Anesthesia Pain Management Service    SUBJECTIVE: Patient reports some abdominal pain and surgical site pain.  Patient denies taking pain or psych medications at home, smoking, drinking alcohol or any illicit drug use.     Pain Scale Score	At rest: __10/10_ 	With Activity: ___ 	[X ] Refer to charted pain scores    THERAPY:    [ ] IV PCA Morphine		[ ] 5 mg/mL	[ ] 1 mg/mL  [X ] IV PCA Hydromorphone	[ ] 5 mg/mL	[X ] 1 mg/mL  [ ] IV PCA Fentanyl		[ ] 50 micrograms/mL    Demand dose __0.2_ lockout __6_ (minutes) Continuous Rate _0__ Total: __5.6_  mg used (in past 24 hours)      MEDICATIONS  (STANDING):  acetaminophen  IVPB .. 1000 milliGRAM(s) IV Intermittent once  ALBUTerol    0.083% 2.5 milliGRAM(s) Nebulizer every 6 hours  dextrose 5% + sodium chloride 0.9%. 1000 milliLiter(s) (50 mL/Hr) IV Continuous <Continuous>  dornase duarte Solution 2.5 milliGRAM(s) Inhalation two times a day  heparin   Injectable 5000 Unit(s) SubCutaneous every 8 hours  HYDROmorphone PCA (1 mG/mL) 30 milliLiter(s) PCA Continuous PCA Continuous  magnesium sulfate  IVPB 2 Gram(s) IV Intermittent once  pantoprazole  Injectable 40 milliGRAM(s) IV Push daily  sodium chloride 3%  Inhalation 4 milliLiter(s) Inhalation every 6 hours  sodium phosphate IVPB 30 milliMole(s) IV Intermittent once    MEDICATIONS  (PRN):  HYDROmorphone PCA (1 mG/mL) Rescue Clinician Bolus 0.5 milliGRAM(s) IV Push every 15 minutes PRN for Pain Scale GREATER THAN 6  naloxone Injectable 0.1 milliGRAM(s) IV Push every 3 minutes PRN For ANY of the following changes in patient status:  A. RR LESS THAN 10 breaths per minute, B. Oxygen saturation LESS THAN 90%, C. Sedation score of 6  ondansetron Injectable 4 milliGRAM(s) IV Push every 6 hours PRN Nausea      OBJECTIVE:  Patient is sitting up in chair, NPO with NGT and CT x1.     Sedation Score:	[ X] Alert	 [ ] Drowsy 	[ ] Arousable	[ ] Asleep	[ ] Unresponsive    Side Effects:	[X ] None	[ ] Nausea	[ ] Vomiting	[ ] Pruritus  		[ ] Other:    Vital Signs Last 24 Hrs  T(C): 37.3 (07 Aug 2021 08:00), Max: 37.7 (06 Aug 2021 20:00)  T(F): 99.1 (07 Aug 2021 08:00), Max: 99.8 (06 Aug 2021 20:00)  HR: 66 (07 Aug 2021 09:00) (59 - 86)  BP: 152/96 (07 Aug 2021 09:00) (123/73 - 152/96)  BP(mean): 114 (07 Aug 2021 09:00) (89 - 114)  RR: 14 (07 Aug 2021 09:00) (10 - 21)  SpO2: 97% (07 Aug 2021 09:00) (96% - 100%)    ASSESSMENT/ PLAN    Therapy to  be:	[ X] Continue   [ ] Discontinued   [ ] Change to prn Analgesics    Documentation and Verification of current medications:   [X] Done	[ ] Not done, not elligible    Comments: Will continue with IV Dilaudid PCA. Gave rescue bolus x1 and increased demand to 0.25mg which offered some pain relief.  Recommend non-opioid adjuvant analgesics to be used when possible and when allowed by primary surgical team.   Anesthesia Pain Management Service    SUBJECTIVE: Patient reports some abdominal pain and surgical site pain.  Patient denies taking pain or psych medications at home, smoking, drinking alcohol or any illicit drug use.     Pain Scale Score	At rest: __8/10_ 	With Activity: ___ 	[X ] Refer to charted pain scores    THERAPY:    [ ] IV PCA Morphine		[ ] 5 mg/mL	[ ] 1 mg/mL  [X ] IV PCA Hydromorphone	[ ] 5 mg/mL	[X ] 1 mg/mL  [ ] IV PCA Fentanyl		[ ] 50 micrograms/mL    Demand dose __0.2_ lockout __6_ (minutes) Continuous Rate _0__ Total: __5.6_  mg used (in past 24 hours)      MEDICATIONS  (STANDING):  acetaminophen  IVPB .. 1000 milliGRAM(s) IV Intermittent once  ALBUTerol    0.083% 2.5 milliGRAM(s) Nebulizer every 6 hours  dextrose 5% + sodium chloride 0.9%. 1000 milliLiter(s) (50 mL/Hr) IV Continuous <Continuous>  dornase duarte Solution 2.5 milliGRAM(s) Inhalation two times a day  heparin   Injectable 5000 Unit(s) SubCutaneous every 8 hours  HYDROmorphone PCA (1 mG/mL) 30 milliLiter(s) PCA Continuous PCA Continuous  magnesium sulfate  IVPB 2 Gram(s) IV Intermittent once  pantoprazole  Injectable 40 milliGRAM(s) IV Push daily  sodium chloride 3%  Inhalation 4 milliLiter(s) Inhalation every 6 hours  sodium phosphate IVPB 30 milliMole(s) IV Intermittent once    MEDICATIONS  (PRN):  HYDROmorphone PCA (1 mG/mL) Rescue Clinician Bolus 0.5 milliGRAM(s) IV Push every 15 minutes PRN for Pain Scale GREATER THAN 6  naloxone Injectable 0.1 milliGRAM(s) IV Push every 3 minutes PRN For ANY of the following changes in patient status:  A. RR LESS THAN 10 breaths per minute, B. Oxygen saturation LESS THAN 90%, C. Sedation score of 6  ondansetron Injectable 4 milliGRAM(s) IV Push every 6 hours PRN Nausea      OBJECTIVE:  Patient is sitting up in chair, NPO with NGT and CT x1.     Sedation Score:	[ X] Alert	 [ ] Drowsy 	[ ] Arousable	[ ] Asleep	[ ] Unresponsive    Side Effects:	[X ] None	[ ] Nausea	[ ] Vomiting	[ ] Pruritus  		[ ] Other:    Vital Signs Last 24 Hrs  T(C): 37.3 (07 Aug 2021 08:00), Max: 37.7 (06 Aug 2021 20:00)  T(F): 99.1 (07 Aug 2021 08:00), Max: 99.8 (06 Aug 2021 20:00)  HR: 66 (07 Aug 2021 09:00) (59 - 86)  BP: 152/96 (07 Aug 2021 09:00) (123/73 - 152/96)  BP(mean): 114 (07 Aug 2021 09:00) (89 - 114)  RR: 14 (07 Aug 2021 09:00) (10 - 21)  SpO2: 97% (07 Aug 2021 09:00) (96% - 100%)    ASSESSMENT/ PLAN    Therapy to  be:	[ X] Continue   [ ] Discontinued   [ ] Change to prn Analgesics    Documentation and Verification of current medications:   [X] Done	[ ] Not done, not elligible    Comments: Will continue with IV Dilaudid PCA. Gave rescue bolus x1 and increased demand to 0.25mg which offered some pain relief.  Recommend non-opioid adjuvant analgesics to be used when possible and when allowed by primary surgical team.

## 2021-08-07 NOTE — PROGRESS NOTE ADULT - SUBJECTIVE AND OBJECTIVE BOX
Anesthesia Pain Management Service    SUBJECTIVE: Patient reports some abdominal pain and surgical site pain.  Patient denies taking pain or psych medications at home, smoking, drinking alcohol or any illicit drug use.     Pain Scale Score	At rest: __10/10_ 	With Activity: ___ 	[X ] Refer to charted pain scores    THERAPY:    [ ] IV PCA Morphine		[ ] 5 mg/mL	[ ] 1 mg/mL  [X ] IV PCA Hydromorphone	[ ] 5 mg/mL	[X ] 1 mg/mL  [ ] IV PCA Fentanyl		[ ] 50 micrograms/mL    Demand dose __0.2_ lockout __6_ (minutes) Continuous Rate _0__ Total: __5.6_  mg used (in past 24 hours)      MEDICATIONS  (STANDING):  acetaminophen  IVPB .. 1000 milliGRAM(s) IV Intermittent once  ALBUTerol    0.083% 2.5 milliGRAM(s) Nebulizer every 6 hours  dextrose 5% + sodium chloride 0.9%. 1000 milliLiter(s) (50 mL/Hr) IV Continuous <Continuous>  dornase duarte Solution 2.5 milliGRAM(s) Inhalation two times a day  heparin   Injectable 5000 Unit(s) SubCutaneous every 8 hours  HYDROmorphone PCA (1 mG/mL) 30 milliLiter(s) PCA Continuous PCA Continuous  magnesium sulfate  IVPB 2 Gram(s) IV Intermittent once  pantoprazole  Injectable 40 milliGRAM(s) IV Push daily  sodium chloride 3%  Inhalation 4 milliLiter(s) Inhalation every 6 hours  sodium phosphate IVPB 30 milliMole(s) IV Intermittent once    MEDICATIONS  (PRN):  HYDROmorphone PCA (1 mG/mL) Rescue Clinician Bolus 0.5 milliGRAM(s) IV Push every 15 minutes PRN for Pain Scale GREATER THAN 6  naloxone Injectable 0.1 milliGRAM(s) IV Push every 3 minutes PRN For ANY of the following changes in patient status:  A. RR LESS THAN 10 breaths per minute, B. Oxygen saturation LESS THAN 90%, C. Sedation score of 6  ondansetron Injectable 4 milliGRAM(s) IV Push every 6 hours PRN Nausea      OBJECTIVE:  Patient is sitting up in chair, NPO with NGT and CT x1.     Sedation Score:	[ X] Alert	 [ ] Drowsy 	[ ] Arousable	[ ] Asleep	[ ] Unresponsive    Side Effects:	[X ] None	[ ] Nausea	[ ] Vomiting	[ ] Pruritus  		[ ] Other:    Vital Signs Last 24 Hrs  T(C): 37.3 (07 Aug 2021 08:00), Max: 37.7 (06 Aug 2021 20:00)  T(F): 99.1 (07 Aug 2021 08:00), Max: 99.8 (06 Aug 2021 20:00)  HR: 66 (07 Aug 2021 09:00) (59 - 86)  BP: 152/96 (07 Aug 2021 09:00) (123/73 - 152/96)  BP(mean): 114 (07 Aug 2021 09:00) (89 - 114)  RR: 14 (07 Aug 2021 09:00) (10 - 21)  SpO2: 97% (07 Aug 2021 09:00) (96% - 100%)    ASSESSMENT/ PLAN    Therapy to  be:	[ X] Continue   [ ] Discontinued   [ ] Change to prn Analgesics    Documentation and Verification of current medications:   [X] Done	[ ] Not done, not elligible    Comments: Will continue with IV Dilaudid PCA. Gave rescue bolus x1 and increased demand to 0.25mg which offered some pain relief.  Recommend non-opioid adjuvant analgesics to be used when possible and when allowed by primary surgical team.

## 2021-08-08 LAB
ANION GAP SERPL CALC-SCNC: 13 MMOL/L — SIGNIFICANT CHANGE UP (ref 7–14)
BUN SERPL-MCNC: 11 MG/DL — SIGNIFICANT CHANGE UP (ref 7–23)
CALCIUM SERPL-MCNC: 9.2 MG/DL — SIGNIFICANT CHANGE UP (ref 8.4–10.5)
CHLORIDE SERPL-SCNC: 100 MMOL/L — SIGNIFICANT CHANGE UP (ref 98–107)
CO2 SERPL-SCNC: 25 MMOL/L — SIGNIFICANT CHANGE UP (ref 22–31)
CREAT SERPL-MCNC: 0.81 MG/DL — SIGNIFICANT CHANGE UP (ref 0.5–1.3)
GLUCOSE SERPL-MCNC: 107 MG/DL — HIGH (ref 70–99)
HCT VFR BLD CALC: 32.8 % — LOW (ref 39–50)
HGB BLD-MCNC: 10.5 G/DL — LOW (ref 13–17)
LACTATE SERPL-SCNC: 1.1 MMOL/L — SIGNIFICANT CHANGE UP (ref 0.5–2)
MAGNESIUM SERPL-MCNC: 2 MG/DL — SIGNIFICANT CHANGE UP (ref 1.6–2.6)
MCHC RBC-ENTMCNC: 29.2 PG — SIGNIFICANT CHANGE UP (ref 27–34)
MCHC RBC-ENTMCNC: 32 GM/DL — SIGNIFICANT CHANGE UP (ref 32–36)
MCV RBC AUTO: 91.1 FL — SIGNIFICANT CHANGE UP (ref 80–100)
NRBC # BLD: 0 /100 WBCS — SIGNIFICANT CHANGE UP
NRBC # FLD: 0 K/UL — SIGNIFICANT CHANGE UP
PHOSPHATE SERPL-MCNC: 2.8 MG/DL — SIGNIFICANT CHANGE UP (ref 2.5–4.5)
PLATELET # BLD AUTO: 166 K/UL — SIGNIFICANT CHANGE UP (ref 150–400)
POTASSIUM SERPL-MCNC: 3.9 MMOL/L — SIGNIFICANT CHANGE UP (ref 3.5–5.3)
POTASSIUM SERPL-SCNC: 3.9 MMOL/L — SIGNIFICANT CHANGE UP (ref 3.5–5.3)
PREALB SERPL-MCNC: 10 MG/DL — LOW (ref 20–40)
RBC # BLD: 3.6 M/UL — LOW (ref 4.2–5.8)
RBC # FLD: 12.3 % — SIGNIFICANT CHANGE UP (ref 10.3–14.5)
SODIUM SERPL-SCNC: 138 MMOL/L — SIGNIFICANT CHANGE UP (ref 135–145)
WBC # BLD: 14.82 K/UL — HIGH (ref 3.8–10.5)
WBC # FLD AUTO: 14.82 K/UL — HIGH (ref 3.8–10.5)

## 2021-08-08 PROCEDURE — 71045 X-RAY EXAM CHEST 1 VIEW: CPT | Mod: 26

## 2021-08-08 PROCEDURE — 99233 SBSQ HOSP IP/OBS HIGH 50: CPT

## 2021-08-08 RX ORDER — POTASSIUM PHOSPHATE, MONOBASIC POTASSIUM PHOSPHATE, DIBASIC 236; 224 MG/ML; MG/ML
15 INJECTION, SOLUTION INTRAVENOUS ONCE
Refills: 0 | Status: COMPLETED | OUTPATIENT
Start: 2021-08-08 | End: 2021-08-08

## 2021-08-08 RX ADMIN — ALBUTEROL 2.5 MILLIGRAM(S): 90 AEROSOL, METERED ORAL at 15:50

## 2021-08-08 RX ADMIN — LIDOCAINE 1 PATCH: 4 CREAM TOPICAL at 19:45

## 2021-08-08 RX ADMIN — LIDOCAINE 1 PATCH: 4 CREAM TOPICAL at 23:02

## 2021-08-08 RX ADMIN — PANTOPRAZOLE SODIUM 40 MILLIGRAM(S): 20 TABLET, DELAYED RELEASE ORAL at 11:55

## 2021-08-08 RX ADMIN — HEPARIN SODIUM 5000 UNIT(S): 5000 INJECTION INTRAVENOUS; SUBCUTANEOUS at 21:34

## 2021-08-08 RX ADMIN — HEPARIN SODIUM 5000 UNIT(S): 5000 INJECTION INTRAVENOUS; SUBCUTANEOUS at 14:53

## 2021-08-08 RX ADMIN — SODIUM CHLORIDE 4 MILLILITER(S): 9 INJECTION INTRAMUSCULAR; INTRAVENOUS; SUBCUTANEOUS at 21:05

## 2021-08-08 RX ADMIN — ALBUTEROL 2.5 MILLIGRAM(S): 90 AEROSOL, METERED ORAL at 21:05

## 2021-08-08 RX ADMIN — DORNASE ALFA 2.5 MILLIGRAM(S): 1 SOLUTION RESPIRATORY (INHALATION) at 21:05

## 2021-08-08 RX ADMIN — SODIUM CHLORIDE 4 MILLILITER(S): 9 INJECTION INTRAMUSCULAR; INTRAVENOUS; SUBCUTANEOUS at 15:50

## 2021-08-08 RX ADMIN — LIDOCAINE 1 PATCH: 4 CREAM TOPICAL at 11:54

## 2021-08-08 RX ADMIN — HYDROMORPHONE HYDROCHLORIDE 0.5 MILLIGRAM(S): 2 INJECTION INTRAMUSCULAR; INTRAVENOUS; SUBCUTANEOUS at 19:17

## 2021-08-08 RX ADMIN — HEPARIN SODIUM 5000 UNIT(S): 5000 INJECTION INTRAVENOUS; SUBCUTANEOUS at 06:20

## 2021-08-08 RX ADMIN — SODIUM CHLORIDE 4 MILLILITER(S): 9 INJECTION INTRAMUSCULAR; INTRAVENOUS; SUBCUTANEOUS at 03:12

## 2021-08-08 RX ADMIN — ALBUTEROL 2.5 MILLIGRAM(S): 90 AEROSOL, METERED ORAL at 09:23

## 2021-08-08 RX ADMIN — ALBUTEROL 2.5 MILLIGRAM(S): 90 AEROSOL, METERED ORAL at 03:11

## 2021-08-08 RX ADMIN — SODIUM CHLORIDE 50 MILLILITER(S): 9 INJECTION, SOLUTION INTRAVENOUS at 19:17

## 2021-08-08 RX ADMIN — SODIUM CHLORIDE 50 MILLILITER(S): 9 INJECTION, SOLUTION INTRAVENOUS at 06:19

## 2021-08-08 RX ADMIN — SODIUM CHLORIDE 4 MILLILITER(S): 9 INJECTION INTRAMUSCULAR; INTRAVENOUS; SUBCUTANEOUS at 09:23

## 2021-08-08 RX ADMIN — POTASSIUM PHOSPHATE, MONOBASIC POTASSIUM PHOSPHATE, DIBASIC 62.5 MILLIMOLE(S): 236; 224 INJECTION, SOLUTION INTRAVENOUS at 11:55

## 2021-08-08 RX ADMIN — DORNASE ALFA 2.5 MILLIGRAM(S): 1 SOLUTION RESPIRATORY (INHALATION) at 09:23

## 2021-08-08 RX ADMIN — SODIUM CHLORIDE 50 MILLILITER(S): 9 INJECTION, SOLUTION INTRAVENOUS at 11:55

## 2021-08-08 NOTE — PROGRESS NOTE ADULT - SUBJECTIVE AND OBJECTIVE BOX
Anesthesia Pain Management Service    SUBJECTIVE: Patient is doing well with IV PCA and no significant problems reported.  Patient reports feeling much better today than yesterday.  Patient feels that his pain is more controlled.    Pain Scale Score	At rest: __5-6/10_ 	With Activity: ___ 	[X ] Refer to charted pain scores    THERAPY:    [ ] IV PCA Morphine		[ ] 5 mg/mL	[ ] 1 mg/mL  [X ] IV PCA Hydromorphone	[ ] 5 mg/mL	[X ] 1 mg/mL  [ ] IV PCA Fentanyl		[ ] 50 micrograms/mL    Demand dose __0.2_ lockout __6_ (minutes) Continuous Rate _0__ Total: _11.50__  mg used (in past 24 hours)      MEDICATIONS  (STANDING):  ALBUTerol    0.083% 2.5 milliGRAM(s) Nebulizer every 6 hours  dextrose 5% + sodium chloride 0.9%. 1000 milliLiter(s) (50 mL/Hr) IV Continuous <Continuous>  dornase duarte Solution 2.5 milliGRAM(s) Inhalation two times a day  heparin   Injectable 5000 Unit(s) SubCutaneous every 8 hours  HYDROmorphone PCA (1 mG/mL) 30 milliLiter(s) PCA Continuous PCA Continuous  lidocaine   4% Patch 1 Patch Transdermal every 24 hours  lidocaine   5% Patch 1 Patch Transdermal every 24 hours  pantoprazole  Injectable 40 milliGRAM(s) IV Push daily  potassium phosphate IVPB 15 milliMole(s) IV Intermittent once  sodium chloride 3%  Inhalation 4 milliLiter(s) Inhalation every 6 hours    MEDICATIONS  (PRN):  acetaminophen  IVPB .. 1000 milliGRAM(s) IV Intermittent once PRN Temp greater or equal to 38C (100.4F), Mild Pain (1 - 3)  HYDROmorphone PCA (1 mG/mL) Rescue Clinician Bolus 0.5 milliGRAM(s) IV Push every 15 minutes PRN for Pain Scale GREATER THAN 6  naloxone Injectable 0.1 milliGRAM(s) IV Push every 3 minutes PRN For ANY of the following changes in patient status:  A. RR LESS THAN 10 breaths per minute, B. Oxygen saturation LESS THAN 90%, C. Sedation score of 6  ondansetron Injectable 4 milliGRAM(s) IV Push every 6 hours PRN Nausea      OBJECTIVE:  Patient is sitting up in chair, NPO with NGT and  CTx1.    Sedation Score:	[ X] Alert	 [ ] Drowsy 	[ ] Arousable	[ ] Asleep	[ ] Unresponsive    Side Effects:	[X ] None	[ ] Nausea	[ ] Vomiting	[ ] Pruritus  		[ ] Other:    Vital Signs Last 24 Hrs  T(C): 36.5 (08 Aug 2021 08:00), Max: 37.8 (07 Aug 2021 20:00)  T(F): 97.7 (08 Aug 2021 08:00), Max: 100.1 (07 Aug 2021 20:00)  HR: 70 (08 Aug 2021 07:00) (61 - 85)  BP: 127/76 (08 Aug 2021 07:00) (109/68 - 152/96)  BP(mean): 91 (08 Aug 2021 07:00) (80 - 114)  RR: 19 (08 Aug 2021 07:00) (11 - 26)  SpO2: 95% (08 Aug 2021 07:00) (92% - 99%)    ASSESSMENT/ PLAN    Therapy to  be:	[ X] Continue   [ ] Discontinued   [ ] Change to prn Analgesics    Documentation and Verification of current medications:   [X] Done	[ ] Not done, not elligible    Comments: Will continue with IV Dilaudid PCA.  Recommend non-opioid adjuvant analgesics to be used when possible and when allowed by primary surgical team.

## 2021-08-08 NOTE — PROGRESS NOTE ADULT - SUBJECTIVE AND OBJECTIVE BOX
CHIEF COMPLAINT: FOLLOW UP IN ICU FOR POSTOPERATIVE CARE OF PATIENT WHO IS S/P Three hole Esophagogastrectomy      PROCEDURES: R robotic-assisted VATS mobilization of esophagus, robotic-assisted laparoscopic converted to open [2/2 dense adhesions of celiac trunk / lesser curve of stomach to liver], mobilization of gastric conduit, pyloroplasty. Neck incision for completion of proximal anastomosis.  Placement of jejunostomy tube.  03-Aug-2021         ISSUES:   Acute post operative anemia  Achalasia  Post op pain  Chest tube in place  Hyponatremia    INTERVAL EVENTS:   Tolerating increased tube feeds      HISTORY:   Patient reports moderate pain at abdominal incision sites which is worse with coughing and deep breathing without associated fever or dyspnea. Pain is improved with use of pain meds.     PHYSICAL EXAM:   Gen: Comfortable, No acute distress  Eyes: Sclera white, Conjunctiva normal, Eyelids normal, Pupils symmetrical   ENT: Mucous membranes moist,  ,  ,  NG tube in place. Neck incision without erythema or drainage.  Neck: Trachea midline,  ,  ,  ,  ,    CV: Rate regular, Rhythm regular,  ,  ,    Resp: Breath sounds clear, No accessory muscles use, R chest tube in place,  ,    Abd: Soft, Non-distended, Non-tender, Bowel sounds normal,  ,  ,  Feeding tube in place  Skin: Warm, No peripheral edema of lower extremities,  ,    : No stearns  Neuro: Moving all 4 extremities,    Psych: A&Ox3      ASSESSMENT AND PLAN:     NEURO:  Post-operative Pain - Stable. Pain control with PCA and Tylenol IV PRN.             RESPIRATORY:  Hypoxia - Wean nasal cannula for goal O2sat above 92. Obtain CXR. Incentive spirometry. Chest PT and frequent suctioning. Continue bronchodilators. OOB to chair & ambulate w/ assistance. Continuous pulse oximetry for support & to prevent decompensation.       Drain - Monitor bulb output.          CARDIOVASCULAR:  Hemodynamically stable - Not on pressors. Continue hemodynamic monitoring.  Telemetry (medical test) - Reviewed by me today independently. Normal sinus rhythm.        RENAL:  Stable - Monitor IOs and electrolytes. Keep K above 4.0 and Mg above 2.0.     Hyponatremia - improved. Monitor Na.    GASTROINTESTINAL:  GI prophylaxis not indicated  Zofran and Reglan IV PRN for nausea  NPO until swallow study  J tube tube feeds  NG tube to low suction    Achalasia - s/p esophagectomy. Awaiting swallow study. Continue NG tube to low suction.    HEMATOLOGIC:  Acute post-operative anemia - Stable. Monitor CBC. Transfuse PRBC as needed. Monitor chest tube output.  DVT prophylaxis with heparin subQ and SCDs.           INFECTIOUS DISEASE:  All surgical sites appear clean. Will monitor for fever and leukocytosis.       ENDOCRINE:  Stable – Monitor glucose fingersticks for goal 120-180.            Pertinent clinical, laboratory, radiographic, hemodynamic, echocardiographic, respiratory data, microbiologic data and chart were reviewed by myself and analyzed frequently throughout the course of the day and night by myself.    Plan discussed at length with the CTICU staff and Attending CT Surgeon -   Dr Anh Goodman.     _________________________  VITAL SIGNS:  Vital Signs Last 24 Hrs  T(C): 36.5 (08 Aug 2021 08:00), Max: 37.8 (07 Aug 2021 20:00)  T(F): 97.7 (08 Aug 2021 08:00), Max: 100.1 (07 Aug 2021 20:00)  HR: 77 (08 Aug 2021 10:00) (61 - 85)  BP: 133/89 (08 Aug 2021 10:00) (109/68 - 169/85)  BP(mean): 102 (08 Aug 2021 10:00) (80 - 111)  RR: 12 (08 Aug 2021 10:00) (10 - 26)  SpO2: 95% (08 Aug 2021 10:00) (92% - 100%)  I/Os:   I&O's Detail    07 Aug 2021 07:01  -  08 Aug 2021 07:00  --------------------------------------------------------  IN:    dextrose 5% + sodium chloride 0.9%: 1200 mL    Enteral Tube Flush: 180 mL    Enteral Tube Flush: 180 mL    TwoCal HN: 460 mL  Total IN: 2020 mL    OUT:    Chest Tube (mL): 100 mL    Nasogastric/Oral tube (mL): 525 mL    Voided (mL): 2525 mL  Total OUT: 3150 mL    Total NET: -1130 mL      08 Aug 2021 07:01  -  08 Aug 2021 10:52  --------------------------------------------------------  IN:    dextrose 5% + sodium chloride 0.9%: 150 mL    TwoCal HN: 80 mL  Total IN: 230 mL    OUT:    Chest Tube (mL): 60 mL    Voided (mL): 450 mL  Total OUT: 510 mL    Total NET: -280 mL              MEDICATIONS:  MEDICATIONS  (STANDING):  ALBUTerol    0.083% 2.5 milliGRAM(s) Nebulizer every 6 hours  dextrose 5% + sodium chloride 0.9%. 1000 milliLiter(s) (50 mL/Hr) IV Continuous <Continuous>  dornase duarte Solution 2.5 milliGRAM(s) Inhalation two times a day  heparin   Injectable 5000 Unit(s) SubCutaneous every 8 hours  HYDROmorphone PCA (1 mG/mL) 30 milliLiter(s) PCA Continuous PCA Continuous  lidocaine   4% Patch 1 Patch Transdermal every 24 hours  lidocaine   5% Patch 1 Patch Transdermal every 24 hours  pantoprazole  Injectable 40 milliGRAM(s) IV Push daily  potassium phosphate IVPB 15 milliMole(s) IV Intermittent once  sodium chloride 3%  Inhalation 4 milliLiter(s) Inhalation every 6 hours    MEDICATIONS  (PRN):  acetaminophen  IVPB .. 1000 milliGRAM(s) IV Intermittent once PRN Temp greater or equal to 38C (100.4F), Mild Pain (1 - 3)  HYDROmorphone PCA (1 mG/mL) Rescue Clinician Bolus 0.5 milliGRAM(s) IV Push every 15 minutes PRN for Pain Scale GREATER THAN 6  naloxone Injectable 0.1 milliGRAM(s) IV Push every 3 minutes PRN For ANY of the following changes in patient status:  A. RR LESS THAN 10 breaths per minute, B. Oxygen saturation LESS THAN 90%, C. Sedation score of 6  ondansetron Injectable 4 milliGRAM(s) IV Push every 6 hours PRN Nausea      LABS:  Laboratory data was independently reviewed by me today.                           10.5   14.82 )-----------( 166      ( 08 Aug 2021 05:58 )             32.8     08-08    138  |  100  |  11  ----------------------------<  107<H>  3.9   |  25  |  0.81    Ca    9.2      08 Aug 2021 05:58  Phos  2.8     08-08  Mg     2.00     08-08        PT/INR - ( 07 Aug 2021 06:54 )   PT: 13.5 sec;   INR: 1.18 ratio         PTT - ( 07 Aug 2021 06:54 )  PTT:29.2 sec        RADIOLOGY:   Radiology images were independently reviewed by me today. Reports were reviewed by me today.    Xray Chest 1 View- PORTABLE-Routine:   EXAM:  XR CHEST PORTABLE ROUTINE 1V        PROCEDURE DATE:  Aug  7 2021         INTERPRETATION:  TIME OF EXAM: August 17, 2021 at 4:49 AM    CLINICAL INFORMATION: Chest and nasogastric tube.    TECHNIQUE:   Portable chest    INTERPRETATION:    Enteric tube with tip just past the esophagogastric junction. Right-sided chest tube in place. Prominent right paraspinal lucency likely air distended esophagus or gastric pull-through. Small left effusion with underlying atelectasis again seen. No pneumothorax.      COMPARISON:  August 6 without significant change.      IMPRESSION:  Follow-up post esophageal surgery with gaseous dilatation likely secondary to gastric pull-through.    --- End of Report ---              ALONDRA HOLLEY MD; Attending Radiologist  This document has been electronically signed. Aug  7 2021  9:56AM (08-07-21 @ 05:16)  Xray Chest 1 View- PORTABLE-Urgent:   EXAM:  XR CHEST PORTABLE URGENT 1V        PROCEDURE DATE:  Aug  6 2021         INTERPRETATION:  DATE OF STUDY: 8/6/21 at 12:53PM    PRIOR:Earlier 8/6/21 exam    CLINICAL INDICATION: 8/6/21 at 12:53PM    TECHNIQUE: portable chest.    IMPRESSION:  Enteric tube in stomach-tip is off the film.  One of the two right chest tubes has been removed.  The cardiomediastinal silhouette is within normal limits.  Persistent small left pleural effusion.  Tiny right apical pneumothorax persists.      --- End of Report ---              YAN FANG MD; Attending Radiologist  This document has been electronically signed. Aug  6 2021  2:44PM (08-06-21 @ 13:22)  Xray Chest 1 View- PORTABLE-Urgent:   EXAM:  XR CHEST PORTABLE URGENT 1V        PROCEDURE DATE:  Aug  6 2021         INTERPRETATION:  TIME OF EXAM: August 6, 2021 at 10:46 AM    CLINICAL INFORMATION: Follow-up post esophagectomy and waterseal.    TECHNIQUE:   Portable chest    INTERPRETATION:    The enteric tube and right-sided chest tubes unchanged. Post waterseal there has been no interval change in the appearance of the right lung with a tiny apical pneumothorax again seen.    Small left postop effusion present. Heart size is stable.      COMPARISON:  August 6 at 5:39 AM      IMPRESSION:  Follow-up post chest tube on waterseal without interval change.    --- End of Report ---              ALONDRA HOLLEY MD; Attending Radiologist  This document has been electronically signed. Aug  6 2021 12:48PM (08-06-21 @ 11:23)       CHIEF COMPLAINT: FOLLOW UP IN ICU FOR POSTOPERATIVE CARE OF PATIENT WHO IS S/P Three hole Esophagogastrectomy      PROCEDURES: R robotic-assisted VATS mobilization of esophagus, robotic-assisted laparoscopic converted to open [2/2 dense adhesions of celiac trunk / lesser curve of stomach to liver], mobilization of gastric conduit, pyloroplasty. Neck incision for completion of proximal anastomosis.  Placement of jejunostomy tube.  03-Aug-2021         ISSUES:   Acute post operative anemia  Achalasia  Post op pain  Chest tube in place  Hyponatremia    INTERVAL EVENTS:   Tolerating increased tube feeds  NG tube pulled back due to high output.  Awaiting cine and swallow study      HISTORY:   Patient reports moderate pain at abdominal incision sites which is worse with coughing and deep breathing without associated fever or dyspnea. Pain is improved with use of pain meds.     PHYSICAL EXAM:   Gen: Comfortable, No acute distress  Eyes: Sclera white, Conjunctiva normal, Eyelids normal, Pupils symmetrical   ENT: Mucous membranes moist,  ,  ,  NG tube in place. Neck incision without erythema or drainage.  Neck: Trachea midline,  ,  ,  ,  ,    CV: Rate regular, Rhythm regular,  ,  ,    Resp: Breath sounds clear, No accessory muscles use, R chest tube in place,  ,    Abd: Soft, Non-distended, Non-tender, Bowel sounds normal,  ,  ,  Feeding tube in place  Skin: Warm, No peripheral edema of lower extremities,  ,    : No stearns  Neuro: Moving all 4 extremities,    Psych: A&Ox3      ASSESSMENT AND PLAN:     NEURO:  Post-operative Pain - Stable. Pain control with PCA and Tylenol IV PRN.             RESPIRATORY:  Hypoxia - Wean nasal cannula for goal O2sat above 92. Obtain CXR. Incentive spirometry. Chest PT and frequent suctioning. Continue bronchodilators. OOB to chair & ambulate w/ assistance. Continuous pulse oximetry for support & to prevent decompensation.       Drain - Monitor bulb output.          CARDIOVASCULAR:  Hemodynamically stable - Not on pressors. Continue hemodynamic monitoring.  Telemetry (medical test) - Reviewed by me today independently. Normal sinus rhythm.        RENAL:  Stable - Monitor IOs and electrolytes. Keep K above 4.0 and Mg above 2.0.     Hyponatremia - improved. Monitor Na.    GASTROINTESTINAL:  GI prophylaxis not indicated  Zofran and Reglan IV PRN for nausea  NPO until swallow study  J tube tube feeds  NG tube to low suction    Achalasia - s/p esophagectomy. Awaiting swallow study. Continue NG tube to low suction.    HEMATOLOGIC:  Acute post-operative anemia - Stable. Monitor CBC. Transfuse PRBC as needed. Monitor chest tube output.  DVT prophylaxis with heparin subQ and SCDs.           INFECTIOUS DISEASE:  All surgical sites appear clean. Will monitor for fever and leukocytosis.       ENDOCRINE:  Stable – Monitor glucose fingersticks for goal 120-180.            Pertinent clinical, laboratory, radiographic, hemodynamic, echocardiographic, respiratory data, microbiologic data and chart were reviewed by myself and analyzed frequently throughout the course of the day and night by myself.    Plan discussed at length with the CTICU staff and Attending CT Surgeon -   Dr Anh Goodman.     _________________________  VITAL SIGNS:  Vital Signs Last 24 Hrs  T(C): 36.5 (08 Aug 2021 08:00), Max: 37.8 (07 Aug 2021 20:00)  T(F): 97.7 (08 Aug 2021 08:00), Max: 100.1 (07 Aug 2021 20:00)  HR: 77 (08 Aug 2021 10:00) (61 - 85)  BP: 133/89 (08 Aug 2021 10:00) (109/68 - 169/85)  BP(mean): 102 (08 Aug 2021 10:00) (80 - 111)  RR: 12 (08 Aug 2021 10:00) (10 - 26)  SpO2: 95% (08 Aug 2021 10:00) (92% - 100%)  I/Os:   I&O's Detail    07 Aug 2021 07:01  -  08 Aug 2021 07:00  --------------------------------------------------------  IN:    dextrose 5% + sodium chloride 0.9%: 1200 mL    Enteral Tube Flush: 180 mL    Enteral Tube Flush: 180 mL    TwoCal HN: 460 mL  Total IN: 2020 mL    OUT:    Chest Tube (mL): 100 mL    Nasogastric/Oral tube (mL): 525 mL    Voided (mL): 2525 mL  Total OUT: 3150 mL    Total NET: -1130 mL      08 Aug 2021 07:01  -  08 Aug 2021 10:52  --------------------------------------------------------  IN:    dextrose 5% + sodium chloride 0.9%: 150 mL    TwoCal HN: 80 mL  Total IN: 230 mL    OUT:    Chest Tube (mL): 60 mL    Voided (mL): 450 mL  Total OUT: 510 mL    Total NET: -280 mL              MEDICATIONS:  MEDICATIONS  (STANDING):  ALBUTerol    0.083% 2.5 milliGRAM(s) Nebulizer every 6 hours  dextrose 5% + sodium chloride 0.9%. 1000 milliLiter(s) (50 mL/Hr) IV Continuous <Continuous>  dornase duarte Solution 2.5 milliGRAM(s) Inhalation two times a day  heparin   Injectable 5000 Unit(s) SubCutaneous every 8 hours  HYDROmorphone PCA (1 mG/mL) 30 milliLiter(s) PCA Continuous PCA Continuous  lidocaine   4% Patch 1 Patch Transdermal every 24 hours  lidocaine   5% Patch 1 Patch Transdermal every 24 hours  pantoprazole  Injectable 40 milliGRAM(s) IV Push daily  potassium phosphate IVPB 15 milliMole(s) IV Intermittent once  sodium chloride 3%  Inhalation 4 milliLiter(s) Inhalation every 6 hours    MEDICATIONS  (PRN):  acetaminophen  IVPB .. 1000 milliGRAM(s) IV Intermittent once PRN Temp greater or equal to 38C (100.4F), Mild Pain (1 - 3)  HYDROmorphone PCA (1 mG/mL) Rescue Clinician Bolus 0.5 milliGRAM(s) IV Push every 15 minutes PRN for Pain Scale GREATER THAN 6  naloxone Injectable 0.1 milliGRAM(s) IV Push every 3 minutes PRN For ANY of the following changes in patient status:  A. RR LESS THAN 10 breaths per minute, B. Oxygen saturation LESS THAN 90%, C. Sedation score of 6  ondansetron Injectable 4 milliGRAM(s) IV Push every 6 hours PRN Nausea      LABS:  Laboratory data was independently reviewed by me today.                           10.5   14.82 )-----------( 166      ( 08 Aug 2021 05:58 )             32.8     08-08    138  |  100  |  11  ----------------------------<  107<H>  3.9   |  25  |  0.81    Ca    9.2      08 Aug 2021 05:58  Phos  2.8     08-08  Mg     2.00     08-08        PT/INR - ( 07 Aug 2021 06:54 )   PT: 13.5 sec;   INR: 1.18 ratio         PTT - ( 07 Aug 2021 06:54 )  PTT:29.2 sec        RADIOLOGY:   Radiology images were independently reviewed by me today. Reports were reviewed by me today.    Xray Chest 1 View- PORTABLE-Routine:   EXAM:  XR CHEST PORTABLE ROUTINE 1V        PROCEDURE DATE:  Aug  7 2021         INTERPRETATION:  TIME OF EXAM: August 17, 2021 at 4:49 AM    CLINICAL INFORMATION: Chest and nasogastric tube.    TECHNIQUE:   Portable chest    INTERPRETATION:    Enteric tube with tip just past the esophagogastric junction. Right-sided chest tube in place. Prominent right paraspinal lucency likely air distended esophagus or gastric pull-through. Small left effusion with underlying atelectasis again seen. No pneumothorax.      COMPARISON:  August 6 without significant change.      IMPRESSION:  Follow-up post esophageal surgery with gaseous dilatation likely secondary to gastric pull-through.    --- End of Report ---              ALONDRA HOLLEY MD; Attending Radiologist  This document has been electronically signed. Aug  7 2021  9:56AM (08-07-21 @ 05:16)  Xray Chest 1 View- PORTABLE-Urgent:   EXAM:  XR CHEST PORTABLE URGENT 1V        PROCEDURE DATE:  Aug  6 2021         INTERPRETATION:  DATE OF STUDY: 8/6/21 at 12:53PM    PRIOR:Earlier 8/6/21 exam    CLINICAL INDICATION: 8/6/21 at 12:53PM    TECHNIQUE: portable chest.    IMPRESSION:  Enteric tube in stomach-tip is off the film.  One of the two right chest tubes has been removed.  The cardiomediastinal silhouette is within normal limits.  Persistent small left pleural effusion.  Tiny right apical pneumothorax persists.      --- End of Report ---              YAN FANG MD; Attending Radiologist  This document has been electronically signed. Aug  6 2021  2:44PM (08-06-21 @ 13:22)  Xray Chest 1 View- PORTABLE-Urgent:   EXAM:  XR CHEST PORTABLE URGENT 1V        PROCEDURE DATE:  Aug  6 2021         INTERPRETATION:  TIME OF EXAM: August 6, 2021 at 10:46 AM    CLINICAL INFORMATION: Follow-up post esophagectomy and waterseal.    TECHNIQUE:   Portable chest    INTERPRETATION:    The enteric tube and right-sided chest tubes unchanged. Post waterseal there has been no interval change in the appearance of the right lung with a tiny apical pneumothorax again seen.    Small left postop effusion present. Heart size is stable.      COMPARISON:  August 6 at 5:39 AM      IMPRESSION:  Follow-up post chest tube on waterseal without interval change.    --- End of Report ---              ALONDRA HOLLEY MD; Attending Radiologist  This document has been electronically signed. Aug  6 2021 12:48PM (08-06-21 @ 11:23)

## 2021-08-08 NOTE — PROGRESS NOTE ADULT - SUBJECTIVE AND OBJECTIVE BOX
Anesthesia Pain Management Service    SUBJECTIVE: Patient is doing well with IV PCA     Pain Scale Score	At rest: __5-6/10_ 	With Activity: ___ 	[X ] Refer to charted pain scores    THERAPY:    [ ] IV PCA Morphine		[ ] 5 mg/mL	[ ] 1 mg/mL  [X ] IV PCA Hydromorphone	[ ] 5 mg/mL	[X ] 1 mg/mL  [ ] IV PCA Fentanyl		[ ] 50 micrograms/mL    Demand dose __0.2_ lockout __6_ (minutes) Continuous Rate _0__ Total: _11.50__  mg used (in past 24 hours)      MEDICATIONS  (STANDING):  ALBUTerol    0.083% 2.5 milliGRAM(s) Nebulizer every 6 hours  dextrose 5% + sodium chloride 0.9%. 1000 milliLiter(s) (50 mL/Hr) IV Continuous <Continuous>  dornase duarte Solution 2.5 milliGRAM(s) Inhalation two times a day  heparin   Injectable 5000 Unit(s) SubCutaneous every 8 hours  HYDROmorphone PCA (1 mG/mL) 30 milliLiter(s) PCA Continuous PCA Continuous  lidocaine   4% Patch 1 Patch Transdermal every 24 hours  lidocaine   5% Patch 1 Patch Transdermal every 24 hours  pantoprazole  Injectable 40 milliGRAM(s) IV Push daily  potassium phosphate IVPB 15 milliMole(s) IV Intermittent once  sodium chloride 3%  Inhalation 4 milliLiter(s) Inhalation every 6 hours    MEDICATIONS  (PRN):  acetaminophen  IVPB .. 1000 milliGRAM(s) IV Intermittent once PRN Temp greater or equal to 38C (100.4F), Mild Pain (1 - 3)  HYDROmorphone PCA (1 mG/mL) Rescue Clinician Bolus 0.5 milliGRAM(s) IV Push every 15 minutes PRN for Pain Scale GREATER THAN 6  naloxone Injectable 0.1 milliGRAM(s) IV Push every 3 minutes PRN For ANY of the following changes in patient status:  A. RR LESS THAN 10 breaths per minute, B. Oxygen saturation LESS THAN 90%, C. Sedation score of 6  ondansetron Injectable 4 milliGRAM(s) IV Push every 6 hours PRN Nausea      OBJECTIVE:  NPO with NGT and  CTx1.    Sedation Score:	[ X] Alert	 [ ] Drowsy 	[ ] Arousable	[ ] Asleep	[ ] Unresponsive    Side Effects:	[X ] None	[ ] Nausea	[ ] Vomiting	[ ] Pruritus  		[ ] Other:    Vital Signs Last 24 Hrs  T(C): 36.5 (08 Aug 2021 08:00), Max: 37.8 (07 Aug 2021 20:00)  T(F): 97.7 (08 Aug 2021 08:00), Max: 100.1 (07 Aug 2021 20:00)  HR: 70 (08 Aug 2021 07:00) (61 - 85)  BP: 127/76 (08 Aug 2021 07:00) (109/68 - 152/96)  BP(mean): 91 (08 Aug 2021 07:00) (80 - 114)  RR: 19 (08 Aug 2021 07:00) (11 - 26)  SpO2: 95% (08 Aug 2021 07:00) (92% - 99%)    ASSESSMENT/ PLAN    Therapy to  be:	[ X] Continue   [ ] Discontinued   [ ] Change to prn Analgesics    Documentation and Verification of current medications:   [X] Done	[ ] Not done, not elligible    Comments:continue with IV Dilaudid PCA.  Recommend non-opioid adjuvant analgesics to be used when possible and when allowed by primary surgical team.    patient seen earlier

## 2021-08-09 LAB
ANION GAP SERPL CALC-SCNC: 10 MMOL/L — SIGNIFICANT CHANGE UP (ref 7–14)
BUN SERPL-MCNC: 13 MG/DL — SIGNIFICANT CHANGE UP (ref 7–23)
CALCIUM SERPL-MCNC: 9.2 MG/DL — SIGNIFICANT CHANGE UP (ref 8.4–10.5)
CHLORIDE SERPL-SCNC: 105 MMOL/L — SIGNIFICANT CHANGE UP (ref 98–107)
CO2 SERPL-SCNC: 26 MMOL/L — SIGNIFICANT CHANGE UP (ref 22–31)
CREAT SERPL-MCNC: 0.9 MG/DL — SIGNIFICANT CHANGE UP (ref 0.5–1.3)
GLUCOSE SERPL-MCNC: 109 MG/DL — HIGH (ref 70–99)
HCT VFR BLD CALC: 30.3 % — LOW (ref 39–50)
HGB BLD-MCNC: 9.6 G/DL — LOW (ref 13–17)
MAGNESIUM SERPL-MCNC: 2 MG/DL — SIGNIFICANT CHANGE UP (ref 1.6–2.6)
MCHC RBC-ENTMCNC: 29 PG — SIGNIFICANT CHANGE UP (ref 27–34)
MCHC RBC-ENTMCNC: 31.7 GM/DL — LOW (ref 32–36)
MCV RBC AUTO: 91.5 FL — SIGNIFICANT CHANGE UP (ref 80–100)
NRBC # BLD: 0 /100 WBCS — SIGNIFICANT CHANGE UP
NRBC # FLD: 0 K/UL — SIGNIFICANT CHANGE UP
PHOSPHATE SERPL-MCNC: 3.1 MG/DL — SIGNIFICANT CHANGE UP (ref 2.5–4.5)
PLATELET # BLD AUTO: 208 K/UL — SIGNIFICANT CHANGE UP (ref 150–400)
POTASSIUM SERPL-MCNC: 4.2 MMOL/L — SIGNIFICANT CHANGE UP (ref 3.5–5.3)
POTASSIUM SERPL-SCNC: 4.2 MMOL/L — SIGNIFICANT CHANGE UP (ref 3.5–5.3)
RBC # BLD: 3.31 M/UL — LOW (ref 4.2–5.8)
RBC # FLD: 12.5 % — SIGNIFICANT CHANGE UP (ref 10.3–14.5)
SODIUM SERPL-SCNC: 141 MMOL/L — SIGNIFICANT CHANGE UP (ref 135–145)
WBC # BLD: 12.39 K/UL — HIGH (ref 3.8–10.5)
WBC # FLD AUTO: 12.39 K/UL — HIGH (ref 3.8–10.5)

## 2021-08-09 PROCEDURE — 74230 X-RAY XM SWLNG FUNCJ C+: CPT | Mod: 26

## 2021-08-09 PROCEDURE — 71045 X-RAY EXAM CHEST 1 VIEW: CPT | Mod: 26

## 2021-08-09 PROCEDURE — 93970 EXTREMITY STUDY: CPT | Mod: 26

## 2021-08-09 PROCEDURE — 99233 SBSQ HOSP IP/OBS HIGH 50: CPT

## 2021-08-09 RX ADMIN — ALBUTEROL 2.5 MILLIGRAM(S): 90 AEROSOL, METERED ORAL at 15:11

## 2021-08-09 RX ADMIN — HEPARIN SODIUM 5000 UNIT(S): 5000 INJECTION INTRAVENOUS; SUBCUTANEOUS at 13:36

## 2021-08-09 RX ADMIN — ALBUTEROL 2.5 MILLIGRAM(S): 90 AEROSOL, METERED ORAL at 22:45

## 2021-08-09 RX ADMIN — SODIUM CHLORIDE 4 MILLILITER(S): 9 INJECTION INTRAMUSCULAR; INTRAVENOUS; SUBCUTANEOUS at 15:11

## 2021-08-09 RX ADMIN — HYDROMORPHONE HYDROCHLORIDE 30 MILLILITER(S): 2 INJECTION INTRAMUSCULAR; INTRAVENOUS; SUBCUTANEOUS at 07:35

## 2021-08-09 RX ADMIN — HYDROMORPHONE HYDROCHLORIDE 30 MILLILITER(S): 2 INJECTION INTRAMUSCULAR; INTRAVENOUS; SUBCUTANEOUS at 19:38

## 2021-08-09 RX ADMIN — HEPARIN SODIUM 5000 UNIT(S): 5000 INJECTION INTRAVENOUS; SUBCUTANEOUS at 21:55

## 2021-08-09 RX ADMIN — LIDOCAINE 1 PATCH: 4 CREAM TOPICAL at 13:30

## 2021-08-09 RX ADMIN — SODIUM CHLORIDE 50 MILLILITER(S): 9 INJECTION, SOLUTION INTRAVENOUS at 05:20

## 2021-08-09 RX ADMIN — DORNASE ALFA 2.5 MILLIGRAM(S): 1 SOLUTION RESPIRATORY (INHALATION) at 22:46

## 2021-08-09 RX ADMIN — LIDOCAINE 1 PATCH: 4 CREAM TOPICAL at 20:00

## 2021-08-09 RX ADMIN — SODIUM CHLORIDE 4 MILLILITER(S): 9 INJECTION INTRAMUSCULAR; INTRAVENOUS; SUBCUTANEOUS at 22:46

## 2021-08-09 RX ADMIN — SODIUM CHLORIDE 4 MILLILITER(S): 9 INJECTION INTRAMUSCULAR; INTRAVENOUS; SUBCUTANEOUS at 03:46

## 2021-08-09 RX ADMIN — ALBUTEROL 2.5 MILLIGRAM(S): 90 AEROSOL, METERED ORAL at 03:45

## 2021-08-09 RX ADMIN — HEPARIN SODIUM 5000 UNIT(S): 5000 INJECTION INTRAVENOUS; SUBCUTANEOUS at 05:17

## 2021-08-09 RX ADMIN — PANTOPRAZOLE SODIUM 40 MILLIGRAM(S): 20 TABLET, DELAYED RELEASE ORAL at 13:36

## 2021-08-09 NOTE — PROGRESS NOTE ADULT - SUBJECTIVE AND OBJECTIVE BOX
PRATEEK THOMPSON            [id number]         No Known Allergies               50 year old male with  PMH of esophageal dysfunction  presents to Presurgical testing with diagnosis of achalasia of cardia scheduled for upper endoscopy robotic assisted right video assisted laparoscopic neck incision, three holes esophagectomy , J tube placement  Patient with reports that he always feels that his food is stuck in his chest and not going down. States that he had several endoscopic procedures but no relief  (22 Jul 2021 10:56)      Procedure:  Flex bronch, R robotic-assisted VATS mobilization of esophagus, robotic-assisted laparoscopic converted to open [2/2 dense adhesions of celiac trunk / lesser curve of stomach to liver], mobilization of gastric conduit, pyloroplasty. Neck incision for completion of proximal anastomosis.  Placement of jejunostomy tube 7/3/2021      Issues:              Achalasia              Postop pain  Chest tube in place  Vocal cord paralysis      PAST MEDICAL & SURGICAL HISTORY:  Achalasia    Shoulder injury  left    History of esophagogastroduodenoscopy (EGD)          ICU Vital Signs Last 24 Hrs  T(C): 36.2 (09 Aug 2021 08:00), Max: 37.7 (08 Aug 2021 20:00)  T(F): 97.1 (09 Aug 2021 08:00), Max: 99.9 (08 Aug 2021 20:00)  HR: 80 (09 Aug 2021 07:00) (76 - 89)  BP: 132/85 (09 Aug 2021 07:00) (100/60 - 144/90)  BP(mean): 101 (09 Aug 2021 07:00) (69 - 107)  ABP: --  ABP(mean): --  RR: 15 (09 Aug 2021 07:00) (11 - 21)  SpO2: 95% (09 Aug 2021 07:00) (92% - 100%)    I&O's Detail    08 Aug 2021 07:01  -  09 Aug 2021 07:00  --------------------------------------------------------  IN:    dextrose 5% + sodium chloride 0.9%: 1150 mL    Enteral Tube Flush: 120 mL    Enteral Tube Flush: 120 mL    IV PiggyBack: 250 mL    TwoCal HN: 734 mL  Total IN: 2374 mL    OUT:    Chest Tube (mL): 180 mL    Nasogastric/Oral tube (mL): 500 mL    Voided (mL): 2200 mL  Total OUT: 2880 mL    Total NET: -506 mL        CAPILLARY BLOOD GLUCOSE      POCT Blood Glucose.: 97 mg/dL (07 Aug 2021 18:41)      Home Medications:  none as per patient:  (22 Jul 2021 11:00)      MEDICATIONS  (STANDING):  ALBUTerol    0.083% 2.5 milliGRAM(s) Nebulizer every 6 hours  dextrose 5% + sodium chloride 0.9%. 1000 milliLiter(s) (50 mL/Hr) IV Continuous <Continuous>  dornase duarte Solution 2.5 milliGRAM(s) Inhalation two times a day  heparin   Injectable 5000 Unit(s) SubCutaneous every 8 hours  HYDROmorphone PCA (1 mG/mL) 30 milliLiter(s) PCA Continuous PCA Continuous  lidocaine   4% Patch 1 Patch Transdermal every 24 hours  lidocaine   5% Patch 1 Patch Transdermal every 24 hours  pantoprazole  Injectable 40 milliGRAM(s) IV Push daily  sodium chloride 3%  Inhalation 4 milliLiter(s) Inhalation every 6 hours    MEDICATIONS  (PRN):  acetaminophen  IVPB .. 1000 milliGRAM(s) IV Intermittent once PRN Temp greater or equal to 38C (100.4F), Mild Pain (1 - 3)  HYDROmorphone PCA (1 mG/mL) Rescue Clinician Bolus 0.5 milliGRAM(s) IV Push every 15 minutes PRN for Pain Scale GREATER THAN 6  naloxone Injectable 0.1 milliGRAM(s) IV Push every 3 minutes PRN For ANY of the following changes in patient status:  A. RR LESS THAN 10 breaths per minute, B. Oxygen saturation LESS THAN 90%, C. Sedation score of 6  ondansetron Injectable 4 milliGRAM(s) IV Push every 6 hours PRN Nausea      Physical exam:   General:               Pt is awake, alert, not in any distress                                                  Neuro:                  Nonfocal                             Cardiovascular:   S1 & S2, regular                           Respiratory:         Air entry is fair and equal on both sides, has bilateral conducted sounds                           GI:                          Soft, nondistended and nontender, Bowel sounds absent                            Ext:                        No cyanosis or edema                              Labs:                                                                           9.6    12.39 )-----------( 208      ( 09 Aug 2021 04:46 )             30.3             08-09    141  |  105  |  13  ----------------------------<  109<H>  4.2   |  26  |  0.90    Ca    9.2      09 Aug 2021 04:46  Phos  3.1     08-09  Mg     2.00     08-09                      CXR:  < from: Xray Chest 1 View- PORTABLE-Urgent (Xray Chest 1 View- PORTABLE-Urgent .) (08.08.21 @ 12:22) >  5:19 AM:  Right-sided chest tube and enteric tube seen with the sidehole at the level of the distal esophagus and the tip of the enteric tube at the level of the hemidiaphragms.    The lungs are clear. The heart is not enlarged. Gastric pull-through present.    Small left effusion but no pneumothorax.    11:02 AM:  Tip of the enteric tube unchanged. Right-sided chest tube in place. Lungs are clear aside from small effusion/atelectasis at the left lung base.      COMPARISON:  August 7      IMPRESSION:  Follow-up studies post esophagectomy with right-sided chest tube and enteric tube tip at level of the cardioesophageal junction.      Plan:    General: 50yMale s/p Flex bronch, R robotic-assisted VATS mobilization of esophagus, robotic-assisted laparoscopic converted to open [2/2 dense adhesions of celiac trunk / lesser curve of stomach to liver], mobilization of gastric conduit, pyloroplasty. Neck incision for completion of proximal anastomosis.  Placement of jejunostomy tube 7/3/2021. Remained intubated and on vent support.                             Neuro:                                          Pain control with Dilaudid  PCA      R-vocal fold paresis vs. paralyzed - Continue NPO status  ENT f/u   Cine study / Barium swallow-  Await official report                             Cardiovascular:                                           Continue hemodynamic monitoring.                                         Continue IVF D5LR 30cc/hr                            Respiratory:                                          Pt is on RA      Chest tube D/CD. Monitor Donell output                                                               Continue bronchodilators, pulmonary toilet                            GI                                          NPO with tube feeds                                          Continue GI prophylaxis with  Protonix                                          Continue Zofran / Reglan for nausea - PRN                                          NGT D/CD                                          Flush  J-tube with 20cc of sterile water Q 4hrs.        Tolerating J-tube feeds, Goal 2CalHN @36cc/hr	                                                                 Renal:                                          Continue D5LR 30cc/hr                                          Monitor I/Os and electrolytes                                                                                         Hem/ Onc:                                                                                   Monitor chest tube output &  signs of bleeding.                                           Follow CBC in AM                           Infectious disease:                                             Monitor for fever / leukocytosis.                                           All surgical incision / chest tube  sites look clean                            Endocrine                                              Continue Accu-Checks with coverage    Pt is on SQ Heparin and Venodyne boots for DVT prophylaxis.     Pertinent clinical, laboratory, radiographic, hemodynamic, echocardiographic, respiratory data, microbiologic data and chart were reviewed and analyzed frequently throughout the course of the day and night  Patient seen, examined and plan discussed with CT Surgeon Dr. Goodman  / CTICU team during rounds.    Status discussed with patient  / updated plan of care          Austin Escobar MD

## 2021-08-09 NOTE — SWALLOW VFSS/MBS ASSESSMENT ADULT - RECOMMENDED CONSISTENCY
1. Soft Solids and Thin liquids MUST USE CHIN TUCK AND DOUBLE SWALLOW OR WILL ASPIRATE   2. Safe Swallow Strategies: Upright position during and for 60 min following PO intake; CHIN TUCK AND DOUBLE SWALLOW FOR EACH BITE/SIP, alternate solids and liquids, small bites and sips, meds in applesauce when able 1. Mechanical Soft Solids and Thin liquids MUST USE CHIN TUCK AND DOUBLE SWALLOW, continue to PEJ feeding as needed    2. Safe Swallow Strategies: Upright position during and for 60 min following PO intake; CHIN TUCK AND DOUBLE SWALLOW FOR EACH BITE/SIP, alternate solids and liquids, small bites and sips, meds in applesauce when able 1. J-tube feedings to meet nutrition/hydration/medication needs 2. Initiate pleasure feeds of thin liquids with CHIN TUCK, advance diet to mechanical soft and thin liquids WITH CHIN TUCK at GI's discretion  2. Safe Swallow Strategies: Upright position during and for 60 min following PO intake; CHIN TUCK AND DOUBLE SWALLOW FOR EACH BITE/SIP, alternate solids and liquids, small bites and sips, meds in applesauce when able 1. J-tube feedings to meet nutrition/hydration/medication needs as patient's PO intake may be guarded to meet caloric needs placing patient at risk for malnutrition/failure to thrive 2. Initiate pleasure feeds of mechanical soft solids and  thin liquids with CHIN TUCK pending GI's clearance  2. Safe Swallow Strategies: Upright position during and for 60 min following PO intake; CHIN TUCK AND DOUBLE SWALLOW FOR EACH BITE/SIP, alternate solids and liquids, small bites and sips, meds in applesauce when able

## 2021-08-09 NOTE — SWALLOW VFSS/MBS ASSESSMENT ADULT - COMMENTS
As per Critical Care Note: General: 50yMale s/p Flex bronch, R robotic-assisted VATS mobilization of esophagus, robotic-assisted laparoscopic converted to open [2/2 dense adhesions of celiac trunk / lesser curve of stomach to liver], mobilization of gastric conduit, pyloroplasty. Neck incision for completion of proximal anastomosis.  Placement of jejunostomy tube 7/3/2021. Remained intubated and on vent support.     SLP received patient standing in radiology suite, RN in radiology suite.  Patient awake, alert, able to follow directions, answer questions and engage in conversation.

## 2021-08-09 NOTE — SWALLOW VFSS/MBS ASSESSMENT ADULT - ADDITIONAL RECOMMENDATIONS
1. Monitor tolerance and reconsult this service as indicated  2. Swallow therapy pending discharge at [facility] [phone], this service will attempt to follow up as schedule permits

## 2021-08-09 NOTE — SWALLOW VFSS/MBS ASSESSMENT ADULT - DIAGNOSTIC IMPRESSIONS
Oral phase characterized by adequate acceptance, adequate anterior bolus containment, functional mastication and manipulation, adequate tongue to palate seal, adequate anterior to posterior transport and adequate oral cavity clearance.   Pharyngeal phase characterized by delayed pharyngeal swallow, reduced base of tongue retraction, reduced hyolaryngeal elevation and excursion, reduced epiglottic deflection, reduced pharyngeal contraction and reduced laryngeal vestibule closure.  Pharyngeal clearance deficits evidenced by mild-moderate vallecula residue and moderate pyriform residue (puree, solids> liquids). Cued reswallows assists with pharyngeal clearance.  There is Silent Laryngeal Penetration during the swallow with puree and honey thick liquids which migrates to the level of the vocal folds, cued cough effective in clearing laryngeal vestibule.  There is Silent Aspiration during the swallow with Nectar Thick Liquids and Thin liquids.  Patient with reduced laryngopharyngeal sensation evidenced by no cough or throat clear response to laryngeal penetration/aspiration events.  There is No Laryngeal Penetration or Aspiration before, during or after the swallow with puree, solids, nectar thick liquids and thin liquids When Using A Chin Tuck and Double Swallow. Patient presents with functional Oral phase and Moderate-Severe Pharyngeal Dysphagia.   Oral phase characterized by adequate acceptance, adequate anterior bolus containment, functional mastication and manipulation, adequate tongue to palate seal, adequate anterior to posterior transport and adequate oral cavity clearance. Pharyngeal phase characterized by delayed pharyngeal swallow, reduced base of tongue retraction, reduced hyolaryngeal elevation and excursion, reduced epiglottic deflection, reduced pharyngeal contraction and reduced laryngeal vestibule closure.  Pharyngeal clearance deficits evidenced by mild-moderate vallecula residue and moderate pyriform residue (puree, solids> liquids). Cued reswallows assists with pharyngeal clearance.  There is Silent Laryngeal Penetration during the swallow with puree and honey thick liquids which migrates to the level of the vocal folds, cued cough effective in clearing laryngeal vestibule.  There is Silent Aspiration during the swallow with Nectar Thick Liquids and Thin liquids.  Patient with reduced laryngopharyngeal sensation evidenced by no cough or throat clear response to laryngeal penetration/aspiration events.  There is No Laryngeal Penetration or Aspiration before, during or after the swallow with puree, solids, nectar thick liquids and thin liquids When Using A Chin Tuck and Double Swallow.    Of note, SLP present for esophagram prior to cinesophagram, noted patient aspirated when taking large amounts of omnipaque.    An Esophageal Screen was completed. The patient was turned to AP view and given a Barium Tablet in a teaspoon of applesauce. The tablet was noted to course through the esophagus.  This is not a full/complete evaluation of the esophagus. Patient presents with functional Oral phase and Moderate-Severe Pharyngeal Dysphagia.   Oral phase characterized by adequate acceptance, adequate anterior bolus containment, functional mastication and manipulation, adequate tongue to palate seal, adequate anterior to posterior transport and adequate oral cavity clearance. Pharyngeal phase characterized by delayed pharyngeal swallow, reduced base of tongue retraction, reduced hyolaryngeal elevation and excursion, reduced epiglottic deflection, reduced pharyngeal contraction and reduced laryngeal vestibule closure.  Pharyngeal clearance deficits evidenced by mild-moderate vallecula residue and moderate pyriform residue (puree, solids> liquids). Cued reswallows assists with pharyngeal clearance.  There is Silent Laryngeal Penetration during the swallow with puree and honey thick liquids which migrates to the level of the vocal folds, cued cough effective in clearing laryngeal vestibule.  There is Silent Aspiration during the swallow with Nectar Thick Liquids and Thin liquids.  Patient with reduced laryngopharyngeal sensation evidenced by no cough or throat clear response to laryngeal penetration/aspiration events.  There is No Laryngeal Penetration or Aspiration before, during or after the swallow with puree, solids, nectar thick liquids and thin liquids When Using A Chin Tuck and Double Swallow.    Of note, SLP present for esophagram prior to cinesophagram, noted patient aspirated when taking large amounts of omnipaque.    An Esophageal Screen was completed. The patient was turned to AP view and given a Barium Tablet in a teaspoon of applesauce. The tablet was noted to course through the pharynx/esophagus.  This is not a full/complete evaluation of the esophagus. Please refer to resophagram report.

## 2021-08-09 NOTE — PROGRESS NOTE ADULT - SUBJECTIVE AND OBJECTIVE BOX
Anesthesia Pain Management Service    SUBJECTIVE: Patient is doing well with IV PCA and no significant problems reported.    Pain Scale Score	At rest: __5/10_ 	With Activity: ___ 	[X ] Refer to charted pain scores    THERAPY:    [ ] IV PCA Morphine		[ ] 5 mg/mL	[ ] 1 mg/mL  [X ] IV PCA Hydromorphone	[ ] 5 mg/mL	[X ] 1 mg/mL  [ ] IV PCA Fentanyl		[ ] 50 micrograms/mL    Demand dose __0.2_ lockout __6_ (minutes) Continuous Rate _0__ Total: __13.4_  mg used (in past 24 hours)      MEDICATIONS  (STANDING):  ALBUTerol    0.083% 2.5 milliGRAM(s) Nebulizer every 6 hours  dextrose 5% + sodium chloride 0.9%. 1000 milliLiter(s) (50 mL/Hr) IV Continuous <Continuous>  dornase duarte Solution 2.5 milliGRAM(s) Inhalation two times a day  heparin   Injectable 5000 Unit(s) SubCutaneous every 8 hours  HYDROmorphone PCA (1 mG/mL) 30 milliLiter(s) PCA Continuous PCA Continuous  lidocaine   4% Patch 1 Patch Transdermal every 24 hours  lidocaine   5% Patch 1 Patch Transdermal every 24 hours  pantoprazole  Injectable 40 milliGRAM(s) IV Push daily  sodium chloride 3%  Inhalation 4 milliLiter(s) Inhalation every 6 hours    MEDICATIONS  (PRN):  acetaminophen  IVPB .. 1000 milliGRAM(s) IV Intermittent once PRN Temp greater or equal to 38C (100.4F), Mild Pain (1 - 3)  HYDROmorphone PCA (1 mG/mL) Rescue Clinician Bolus 0.5 milliGRAM(s) IV Push every 15 minutes PRN for Pain Scale GREATER THAN 6  naloxone Injectable 0.1 milliGRAM(s) IV Push every 3 minutes PRN For ANY of the following changes in patient status:  A. RR LESS THAN 10 breaths per minute, B. Oxygen saturation LESS THAN 90%, C. Sedation score of 6  ondansetron Injectable 4 milliGRAM(s) IV Push every 6 hours PRN Nausea      OBJECTIVE:  Patient is on the stretcher and just got back from a barium swallow test.    Sedation Score:	[ X] Alert	 [ ] Drowsy 	[ ] Arousable	[ ] Asleep	[ ] Unresponsive    Side Effects:	[X ] None	[ ] Nausea	[ ] Vomiting	[ ] Pruritus  		[ ] Other:    Vital Signs Last 24 Hrs  T(C): 36.2 (09 Aug 2021 08:00), Max: 37.7 (08 Aug 2021 20:00)  T(F): 97.1 (09 Aug 2021 08:00), Max: 99.9 (08 Aug 2021 20:00)  HR: 80 (09 Aug 2021 07:00) (76 - 89)  BP: 132/85 (09 Aug 2021 07:00) (100/60 - 144/90)  BP(mean): 101 (09 Aug 2021 07:00) (69 - 107)  RR: 15 (09 Aug 2021 07:00) (11 - 21)  SpO2: 95% (09 Aug 2021 07:00) (92% - 100%)    ASSESSMENT/ PLAN    Therapy to  be:	[ X] Continue   [ ] Discontinued   [ ] Change to prn Analgesics    Documentation and Verification of current medications:   [X] Done	[ ] Not done, not elligible    Comments: Discussed patient with team and will continue with IV Dilaudid PCA for today.  Waiting for results of barium swallow test and to see if patient can possible tolerate an advancing diet.  Recommend non-opioid adjuvant analgesics to be used when possible and when allowed by primary surgical team.

## 2021-08-09 NOTE — SWALLOW VFSS/MBS ASSESSMENT ADULT - PHARYNGEAL PHASE COMMENTS
Pharyngeal phase characterized by delayed pharyngeal swallow, reduced base of tongue retraction, reduced hyolaryngeal elevation and excursion, reduced epiglottic deflection, reduced pharyngeal contraction and reduced laryngeal vestibule closure.

## 2021-08-09 NOTE — PROGRESS NOTE ADULT - NSICDXPILOT_GEN_ALL_CORE
[place]

## 2021-08-10 PROCEDURE — 99233 SBSQ HOSP IP/OBS HIGH 50: CPT

## 2021-08-10 PROCEDURE — 71045 X-RAY EXAM CHEST 1 VIEW: CPT | Mod: 26

## 2021-08-10 RX ORDER — OXYCODONE HYDROCHLORIDE 5 MG/1
5 TABLET ORAL EVERY 4 HOURS
Refills: 0 | Status: DISCONTINUED | OUTPATIENT
Start: 2021-08-10 | End: 2021-08-11

## 2021-08-10 RX ORDER — OXYCODONE HYDROCHLORIDE 5 MG/1
10 TABLET ORAL EVERY 4 HOURS
Refills: 0 | Status: DISCONTINUED | OUTPATIENT
Start: 2021-08-10 | End: 2021-08-12

## 2021-08-10 RX ORDER — HYDROMORPHONE HYDROCHLORIDE 2 MG/ML
0.5 INJECTION INTRAMUSCULAR; INTRAVENOUS; SUBCUTANEOUS EVERY 4 HOURS
Refills: 0 | Status: DISCONTINUED | OUTPATIENT
Start: 2021-08-10 | End: 2021-08-14

## 2021-08-10 RX ORDER — POLYETHYLENE GLYCOL 3350 17 G/17G
17 POWDER, FOR SOLUTION ORAL DAILY
Refills: 0 | Status: DISCONTINUED | OUTPATIENT
Start: 2021-08-10 | End: 2021-08-11

## 2021-08-10 RX ORDER — ACETAMINOPHEN 500 MG
1000 TABLET ORAL ONCE
Refills: 0 | Status: COMPLETED | OUTPATIENT
Start: 2021-08-10 | End: 2021-08-10

## 2021-08-10 RX ADMIN — ALBUTEROL 2.5 MILLIGRAM(S): 90 AEROSOL, METERED ORAL at 04:29

## 2021-08-10 RX ADMIN — HYDROMORPHONE HYDROCHLORIDE 0.5 MILLIGRAM(S): 2 INJECTION INTRAMUSCULAR; INTRAVENOUS; SUBCUTANEOUS at 14:15

## 2021-08-10 RX ADMIN — HEPARIN SODIUM 5000 UNIT(S): 5000 INJECTION INTRAVENOUS; SUBCUTANEOUS at 06:28

## 2021-08-10 RX ADMIN — OXYCODONE HYDROCHLORIDE 10 MILLIGRAM(S): 5 TABLET ORAL at 13:00

## 2021-08-10 RX ADMIN — HEPARIN SODIUM 5000 UNIT(S): 5000 INJECTION INTRAVENOUS; SUBCUTANEOUS at 22:53

## 2021-08-10 RX ADMIN — LIDOCAINE 1 PATCH: 4 CREAM TOPICAL at 01:30

## 2021-08-10 RX ADMIN — POLYETHYLENE GLYCOL 3350 17 GRAM(S): 17 POWDER, FOR SOLUTION ORAL at 12:58

## 2021-08-10 RX ADMIN — ALBUTEROL 2.5 MILLIGRAM(S): 90 AEROSOL, METERED ORAL at 22:39

## 2021-08-10 RX ADMIN — OXYCODONE HYDROCHLORIDE 10 MILLIGRAM(S): 5 TABLET ORAL at 16:00

## 2021-08-10 RX ADMIN — Medication 1000 MILLIGRAM(S): at 16:45

## 2021-08-10 RX ADMIN — SODIUM CHLORIDE 4 MILLILITER(S): 9 INJECTION INTRAMUSCULAR; INTRAVENOUS; SUBCUTANEOUS at 04:29

## 2021-08-10 RX ADMIN — LIDOCAINE 1 PATCH: 4 CREAM TOPICAL at 19:53

## 2021-08-10 RX ADMIN — OXYCODONE HYDROCHLORIDE 10 MILLIGRAM(S): 5 TABLET ORAL at 12:00

## 2021-08-10 RX ADMIN — SODIUM CHLORIDE 4 MILLILITER(S): 9 INJECTION INTRAMUSCULAR; INTRAVENOUS; SUBCUTANEOUS at 15:27

## 2021-08-10 RX ADMIN — SODIUM CHLORIDE 4 MILLILITER(S): 9 INJECTION INTRAMUSCULAR; INTRAVENOUS; SUBCUTANEOUS at 22:40

## 2021-08-10 RX ADMIN — OXYCODONE HYDROCHLORIDE 10 MILLIGRAM(S): 5 TABLET ORAL at 20:50

## 2021-08-10 RX ADMIN — OXYCODONE HYDROCHLORIDE 10 MILLIGRAM(S): 5 TABLET ORAL at 20:20

## 2021-08-10 RX ADMIN — ALBUTEROL 2.5 MILLIGRAM(S): 90 AEROSOL, METERED ORAL at 15:27

## 2021-08-10 RX ADMIN — HEPARIN SODIUM 5000 UNIT(S): 5000 INJECTION INTRAVENOUS; SUBCUTANEOUS at 15:00

## 2021-08-10 RX ADMIN — OXYCODONE HYDROCHLORIDE 10 MILLIGRAM(S): 5 TABLET ORAL at 17:00

## 2021-08-10 RX ADMIN — SODIUM CHLORIDE 4 MILLILITER(S): 9 INJECTION INTRAMUSCULAR; INTRAVENOUS; SUBCUTANEOUS at 09:19

## 2021-08-10 RX ADMIN — DORNASE ALFA 2.5 MILLIGRAM(S): 1 SOLUTION RESPIRATORY (INHALATION) at 22:40

## 2021-08-10 RX ADMIN — PANTOPRAZOLE SODIUM 40 MILLIGRAM(S): 20 TABLET, DELAYED RELEASE ORAL at 12:58

## 2021-08-10 RX ADMIN — Medication 400 MILLIGRAM(S): at 16:30

## 2021-08-10 RX ADMIN — ALBUTEROL 2.5 MILLIGRAM(S): 90 AEROSOL, METERED ORAL at 09:19

## 2021-08-10 RX ADMIN — DORNASE ALFA 2.5 MILLIGRAM(S): 1 SOLUTION RESPIRATORY (INHALATION) at 09:19

## 2021-08-10 RX ADMIN — LIDOCAINE 1 PATCH: 4 CREAM TOPICAL at 12:57

## 2021-08-10 RX ADMIN — HYDROMORPHONE HYDROCHLORIDE 0.5 MILLIGRAM(S): 2 INJECTION INTRAMUSCULAR; INTRAVENOUS; SUBCUTANEOUS at 14:00

## 2021-08-10 NOTE — PROGRESS NOTE ADULT - SUBJECTIVE AND OBJECTIVE BOX
Patient seen and examined at bedside. S/p procedure with CTS. Previously seen by our service although scope exam was limited secondary to NG placed. At that time right true vocal cord appeared immobile with incomplete assessment of left true vocal cord. Patient feels voice is subjectively stronger. No trouble breathing. Speaking in full sentences on room air.       T(C): 36.9 (08-10-21 @ 12:00), Max: 38 (08-09-21 @ 17:00)  HR: 104 (08-10-21 @ 12:00) (78 - 104)  BP: 130/70 (08-10-21 @ 11:00) (114/63 - 153/73)  RR: 17 (08-10-21 @ 12:00) (12 - 26)  SpO2: 97% (08-10-21 @ 12:00) (84% - 100%)    NAD, awake and alert  Breathing comfortably on room air  No stridor/ stertor  Voice: slightly breathy, but strong   NC: clear anteriorly  OC/OP: clear, wnl  Neck: soft, flat, no crepitus or hematoma    Description:    A flexible endoscope was used to examine the left and right nasal cavities, posterior oropharynx, hypopharynx, and larynx. The nasal valve areas were examined for abnormalities or collapse. The inferior and middle turbinates were evaluated. The middle and superior meatuses, the sphenoethmoid recesses, and the nasopharynx were examined and inspected for mucopurulence, polyps, and nasal masses. The oropharynx, tongue base/vallecula, epiglottis, aryepiglottic folds, arytenoids, vocal cords, hypopharynx were also inspected for swelling, inflammation, or dysfunction. The patient tolerated the procedure without complications.    Findings:    NP wnl  BOT/vallecula normal  Epiglottis sharp  AE folds nonedematous  Arytenoids mobile  L TVC immobile, right TVC mobile, complete glottic closure   No masses or lesions visualized in post cricoid space or pyriform sinuses bilaterally    A/P: 50yMale s/p Flex bronch, R robotic-assisted VATS mobilization of esophagus, robotic-assisted laparoscopic converted to open [2/2 dense adhesions of celiac trunk / lesser curve of stomach to liver], mobilization of gastric conduit, pyloroplasty. ENT consulted for vocal cord evaluation given recent hoarseness after procedure 8/4. Now re-evaluation of vocal cords s/p removal of ngt. FOE c/w left true vocal cord immobility. R true vocal cord mobile. Complete glottic closure.   - No acute ENT surgical intervention   - SLP evaluation prior to diet. Complete glottic closure appreaciated  - rest of care per CTICU  - Patient can follow-up in ENT clinic in 6-8 weeks with Dr. Culp. Call [phone] to schedule an appointment if you do not hear from the office. However, will email  to arrange outpatient follow-up  - Will d/w Dr. Carrillo

## 2021-08-10 NOTE — PROGRESS NOTE ADULT - SUBJECTIVE AND OBJECTIVE BOX
CHIEF COMPLAINT: FOLLOW UP IN ICU FOR POSTOPERATIVE CARE OF PATIENT WHO IS S/P Three hole Esophagogastrectomy    PROCEDURES: R robotic-assisted VATS mobilization of esophagus, robotic-assisted laparoscopic converted to open [2/2 dense adhesions of celiac trunk / lesser curve of stomach to liver], mobilization of gastric conduit, pyloroplasty. Neck incision for completion of proximal anastomosis.  Placement of jejunostomy tube.  03-Aug-2021     ISSUES:   Acute post operative anemia  Achalasia  Post op pain  Chest tube in place  Hyponatremia  Incision site hematoma    INTERVAL EVENTS:   -Low grade fever 100.4 Tmax, abdominal surgical incision site with possible hematoma  -Ambulating on RA  -Cine study with possible aspiration, no anastomotic leak  -ENT to f/u today to reassess Right vocal cord for paralysis. Talking with clear voice        HISTORY:   Patient reports moderate pain at abdominal incision sites which is worse with coughing and deep breathing without associated fever or dyspnea. Pain is improved with use of pain meds.     PHYSICAL EXAM:   Gen: Comfortable, No acute distress  Eyes: Sclera white, Conjunctiva normal, Eyelids normal, Pupils symmetrical   ENT: Mucous membranes moist,  ,  ,  NG tube in place. Neck incision without erythema or drainage.  Neck: Trachea midline,  ,  ,  ,  ,    CV: Rate regular, Rhythm regular,  ,  ,    Resp: Breath sounds clear, No accessory muscles use, R esau chest tube in place 220ml output serosang  Abd: Soft, Non-distended, Non-tender, Bowel sounds normal,  ,  ,  Feeding tube in place  Skin: Warm, No peripheral edema of lower extremities,  ,    : No stearns  Neuro: Moving all 4 extremities,    Psych: A&Ox3      ASSESSMENT AND PLAN:     NEURO:  Post-operative Pain - Stable. Pain control with PCA and Tylenol IV PRN. Change to liquid opiates via J tube for pain control             RESPIRATORY:  Hypoxia - Wean nasal cannula for goal O2sat above 92. Obtain CXR. Incentive spirometry. Chest PT and frequent suctioning. Continue bronchodilators. OOB to chair & ambulate w/ assistance. Continuous pulse oximetry for support & to prevent decompensation.       Drain - Monitor bulb output.          CARDIOVASCULAR:  Hemodynamically stable - Not on pressors. Continue hemodynamic monitoring.  Telemetry (medical test) - Reviewed by me today independently. Normal sinus rhythm.        RENAL:  Stable - Monitor IOs and electrolytes. Keep K above 4.0 and Mg above 2.0.     Hyponatremia - improved. Monitor Na.    GASTROINTESTINAL:  GI prophylaxis not indicated  Zofran and Reglan IV PRN for nausea  NPO except ice chips, small sips of water till ENT follow up  J tube feeds at goal 36ml/h  Bowel regimen for constipation        HEMATOLOGIC:  Acute post-operative anemia - Stable. Monitor CBC. Transfuse PRBC as needed. Monitor chest tube output.  DVT prophylaxis with heparin subQ and SCDs.           INFECTIOUS DISEASE:  All surgical sites appear clean except for possible hematoma, no pus oozing, no erythema, no tenderness. Will monitor for fever and leukocytosis.       ENDOCRINE:  Stable – Monitor glucose fingersticks for goal 120-180.          Pertinent clinical, laboratory, radiographic, hemodynamic, echocardiographic, respiratory data, microbiologic data and chart were reviewed by myself and analyzed frequently throughout the course of the day and night by myself.                Plan discussed at length with the CTICU staff and Attending CT Surgeon -   Dr Anh Goodman.                 Patient's status was discussed with patient at bedside    _________________________  VITAL SIGNS:  Vital Signs Last 24 Hrs  T(C): 37.5 (10 Aug 2021 04:00), Max: 38 (09 Aug 2021 17:00)  T(F): 99.5 (10 Aug 2021 04:00), Max: 100.4 (09 Aug 2021 17:00)  HR: 80 (10 Aug 2021 09:24) (76 - 103)  BP: 115/62 (10 Aug 2021 08:00) (114/63 - 153/73)  BP(mean): 78 (10 Aug 2021 08:00) (72 - 109)  RR: 23 (10 Aug 2021 09:00) (12 - 26)  SpO2: 98% (10 Aug 2021 09:24) (93% - 100%)  I/Os:   I&O's Detail    09 Aug 2021 07:01  -  10 Aug 2021 07:00  --------------------------------------------------------  IN:    dextrose 5% + sodium chloride 0.9%: 1150 mL    Enteral Tube Flush: 120 mL    IV PiggyBack: 100 mL    TwoCal HN: 540 mL  Total IN: 1910 mL    OUT:    Chest Tube (mL): 220 mL    Voided (mL): 1550 mL  Total OUT: 1770 mL    Total NET: 140 mL              MEDICATIONS:  MEDICATIONS  (STANDING):  ALBUTerol    0.083% 2.5 milliGRAM(s) Nebulizer every 6 hours  dextrose 5% + sodium chloride 0.9%. 1000 milliLiter(s) (50 mL/Hr) IV Continuous <Continuous>  dornase duarte Solution 2.5 milliGRAM(s) Inhalation two times a day  heparin   Injectable 5000 Unit(s) SubCutaneous every 8 hours  HYDROmorphone PCA (1 mG/mL) 30 milliLiter(s) PCA Continuous PCA Continuous  lidocaine   5% Patch 1 Patch Transdermal every 24 hours  pantoprazole  Injectable 40 milliGRAM(s) IV Push daily  polyethylene glycol 3350 17 Gram(s) Oral daily  sodium chloride 3%  Inhalation 4 milliLiter(s) Inhalation every 6 hours    MEDICATIONS  (PRN):  acetaminophen  IVPB .. 1000 milliGRAM(s) IV Intermittent once PRN Temp greater or equal to 38C (100.4F), Mild Pain (1 - 3)  HYDROmorphone PCA (1 mG/mL) Rescue Clinician Bolus 0.5 milliGRAM(s) IV Push every 15 minutes PRN for Pain Scale GREATER THAN 6  naloxone Injectable 0.1 milliGRAM(s) IV Push every 3 minutes PRN For ANY of the following changes in patient status:  A. RR LESS THAN 10 breaths per minute, B. Oxygen saturation LESS THAN 90%, C. Sedation score of 6  ondansetron Injectable 4 milliGRAM(s) IV Push every 6 hours PRN Nausea      LABS:  Laboratory data was independently reviewed by me today.                           9.6    12.39 )-----------( 208      ( 09 Aug 2021 04:46 )             30.3     08-09    141  |  105  |  13  ----------------------------<  109<H>  4.2   |  26  |  0.90    Ca    9.2      09 Aug 2021 04:46  Phos  3.1     08-09  Mg     2.00     08-09                RADIOLOGY:   Radiology images were independently reviewed by me today. Reports were reviewed by me today.    Xray Chest 1 View- PORTABLE-Routine:   EXAM:  XR CHEST PORTABLE ROUTINE 1V        PROCEDURE DATE:  Aug  9 2021         INTERPRETATION:  TIME OF EXAM: August 9, 2021 at 4:33 AM    CLINICAL INFORMATION: Follow-up post esophagectomy    TECHNIQUE:   Portable chest    INTERPRETATION:    Enteric tube is seen with its tip at the level of the cardioesophageal junction. Right-sided chest tube in place unchanged.    Lungs show no focal consolidations. Small layering left effusion unchanged from the study of the day before. No pneumothorax appreciated. The heart is not enlarged. Right basilar opacities likely postop atelectasis.      COMPARISON:  August 8      IMPRESSION:  Status post esophagectomy with chest tube.    --- End of Report ---              ALONDRA HOLLEY MD; Attending Radiologist  This document has been electronically signed. Aug  9 2021  2:29PM (08-09-21 @ 05:20)  Xray Chest 1 View- PORTABLE-Urgent:   EXAM:  XR CHEST PORTABLE URGENT 1V      EXAM:  XR CHEST PORTABLE ROUTINE 1V        PROCEDURE DATE:  Aug  8 2021         INTERPRETATION:  TIME OF EXAM: August 8, 2021 at 5:19 AM and 11:02 AM    CLINICAL INFORMATION: Post esophagectomy; nasogastric tube adjustment.    TECHNIQUE:   Portable chest    INTERPRETATION:    5:19 AM:  Right-sided chest tube and enteric tube seen with the sidehole at the level of the distal esophagus and the tip of the enteric tube at the level of the hemidiaphragms.    The lungs are clear. The heart is not enlarged. Gastric pull-through present.    Small left effusion but no pneumothorax.    11:02 AM:  Tip of the enteric tube unchanged. Right-sided chest tube in place. Lungs are clear aside from small effusion/atelectasis at the left lung base.      COMPARISON:  August 7      IMPRESSION:  Follow-up studies post esophagectomy with right-sided chest tube and enteric tube tip at level of the cardioesophageal junction.    --- End of Report ---              ALONDRA HOLLEY MD; Attending Radiologist  This document has been electronically signed. Aug  8 2021 11:40AM (08-08-21 @ 12:22)  Xray Chest 1 View- PORTABLE-Routine:   EXAM:  XR CHEST PORTABLE URGENT 1V      EXAM:  XR CHEST PORTABLE ROUTINE 1V        PROCEDURE DATE:  Aug  8 2021         INTERPRETATION:  TIME OF EXAM: August 8, 2021 at 5:19 AM and 11:02 AM    CLINICAL INFORMATION: Post esophagectomy; nasogastric tube adjustment.    TECHNIQUE:   Portable chest    INTERPRETATION:    5:19 AM:  Right-sided chest tube and enteric tube seen with the sidehole at the level of the distal esophagus and the tip of the enteric tube at the level of the hemidiaphragms.    The lungs are clear. The heart is not enlarged. Gastric pull-through present.    Small left effusion but no pneumothorax.    11:02 AM:  Tip of the enteric tube unchanged. Right-sided chest tube in place. Lungs are clear aside from small effusion/atelectasis at the left lung base.      COMPARISON:  August 7      IMPRESSION:  Follow-up studies post esophagectomy with right-sided chest tube and enteric tube tip at level of the cardioesophageal junction.    --- End of Report ---            ALONDRA HOLLEY MD; Attending Radiologist  This document has been electronically signed. Aug  8 2021 11:40AM (08-08-21 @ 10:03)

## 2021-08-11 LAB
ANION GAP SERPL CALC-SCNC: 12 MMOL/L — SIGNIFICANT CHANGE UP (ref 7–14)
BUN SERPL-MCNC: 16 MG/DL — SIGNIFICANT CHANGE UP (ref 7–23)
CALCIUM SERPL-MCNC: 9.6 MG/DL — SIGNIFICANT CHANGE UP (ref 8.4–10.5)
CHLORIDE SERPL-SCNC: 98 MMOL/L — SIGNIFICANT CHANGE UP (ref 98–107)
CO2 SERPL-SCNC: 25 MMOL/L — SIGNIFICANT CHANGE UP (ref 22–31)
CREAT SERPL-MCNC: 0.89 MG/DL — SIGNIFICANT CHANGE UP (ref 0.5–1.3)
GLUCOSE SERPL-MCNC: 78 MG/DL — SIGNIFICANT CHANGE UP (ref 70–99)
HCT VFR BLD CALC: 29.1 % — LOW (ref 39–50)
HGB BLD-MCNC: 9.2 G/DL — LOW (ref 13–17)
MAGNESIUM SERPL-MCNC: 2 MG/DL — SIGNIFICANT CHANGE UP (ref 1.6–2.6)
MCHC RBC-ENTMCNC: 29.4 PG — SIGNIFICANT CHANGE UP (ref 27–34)
MCHC RBC-ENTMCNC: 31.6 GM/DL — LOW (ref 32–36)
MCV RBC AUTO: 93 FL — SIGNIFICANT CHANGE UP (ref 80–100)
NRBC # BLD: 0 /100 WBCS — SIGNIFICANT CHANGE UP
NRBC # FLD: 0 K/UL — SIGNIFICANT CHANGE UP
PHOSPHATE SERPL-MCNC: 3.1 MG/DL — SIGNIFICANT CHANGE UP (ref 2.5–4.5)
PLATELET # BLD AUTO: 317 K/UL — SIGNIFICANT CHANGE UP (ref 150–400)
POTASSIUM SERPL-MCNC: 4.7 MMOL/L — SIGNIFICANT CHANGE UP (ref 3.5–5.3)
POTASSIUM SERPL-SCNC: 4.7 MMOL/L — SIGNIFICANT CHANGE UP (ref 3.5–5.3)
RBC # BLD: 3.13 M/UL — LOW (ref 4.2–5.8)
RBC # FLD: 12.6 % — SIGNIFICANT CHANGE UP (ref 10.3–14.5)
SODIUM SERPL-SCNC: 135 MMOL/L — SIGNIFICANT CHANGE UP (ref 135–145)
WBC # BLD: 15.94 K/UL — HIGH (ref 3.8–10.5)
WBC # FLD AUTO: 15.94 K/UL — HIGH (ref 3.8–10.5)

## 2021-08-11 PROCEDURE — 71045 X-RAY EXAM CHEST 1 VIEW: CPT | Mod: 26

## 2021-08-11 PROCEDURE — 99233 SBSQ HOSP IP/OBS HIGH 50: CPT

## 2021-08-11 RX ORDER — ACETAMINOPHEN 500 MG
650 TABLET ORAL EVERY 6 HOURS
Refills: 0 | Status: DISCONTINUED | OUTPATIENT
Start: 2021-08-11 | End: 2021-08-18

## 2021-08-11 RX ORDER — SENNA PLUS 8.6 MG/1
10 TABLET ORAL DAILY
Refills: 0 | Status: DISCONTINUED | OUTPATIENT
Start: 2021-08-11 | End: 2021-08-18

## 2021-08-11 RX ORDER — GABAPENTIN 400 MG/1
200 CAPSULE ORAL THREE TIMES A DAY
Refills: 0 | Status: DISCONTINUED | OUTPATIENT
Start: 2021-08-11 | End: 2021-08-18

## 2021-08-11 RX ORDER — POLYETHYLENE GLYCOL 3350 17 G/17G
17 POWDER, FOR SOLUTION ORAL
Refills: 0 | Status: DISCONTINUED | OUTPATIENT
Start: 2021-08-11 | End: 2021-08-11

## 2021-08-11 RX ADMIN — OXYCODONE HYDROCHLORIDE 10 MILLIGRAM(S): 5 TABLET ORAL at 12:00

## 2021-08-11 RX ADMIN — SODIUM CHLORIDE 4 MILLILITER(S): 9 INJECTION INTRAMUSCULAR; INTRAVENOUS; SUBCUTANEOUS at 21:50

## 2021-08-11 RX ADMIN — OXYCODONE HYDROCHLORIDE 5 MILLIGRAM(S): 5 TABLET ORAL at 23:20

## 2021-08-11 RX ADMIN — ALBUTEROL 2.5 MILLIGRAM(S): 90 AEROSOL, METERED ORAL at 03:51

## 2021-08-11 RX ADMIN — OXYCODONE HYDROCHLORIDE 10 MILLIGRAM(S): 5 TABLET ORAL at 00:57

## 2021-08-11 RX ADMIN — LIDOCAINE 1 PATCH: 4 CREAM TOPICAL at 14:01

## 2021-08-11 RX ADMIN — DORNASE ALFA 2.5 MILLIGRAM(S): 1 SOLUTION RESPIRATORY (INHALATION) at 21:49

## 2021-08-11 RX ADMIN — SENNA PLUS 10 MILLILITER(S): 8.6 TABLET ORAL at 22:29

## 2021-08-11 RX ADMIN — Medication 650 MILLIGRAM(S): at 14:01

## 2021-08-11 RX ADMIN — OXYCODONE HYDROCHLORIDE 10 MILLIGRAM(S): 5 TABLET ORAL at 06:26

## 2021-08-11 RX ADMIN — SODIUM CHLORIDE 4 MILLILITER(S): 9 INJECTION INTRAMUSCULAR; INTRAVENOUS; SUBCUTANEOUS at 03:50

## 2021-08-11 RX ADMIN — HEPARIN SODIUM 5000 UNIT(S): 5000 INJECTION INTRAVENOUS; SUBCUTANEOUS at 14:01

## 2021-08-11 RX ADMIN — OXYCODONE HYDROCHLORIDE 10 MILLIGRAM(S): 5 TABLET ORAL at 00:27

## 2021-08-11 RX ADMIN — HEPARIN SODIUM 5000 UNIT(S): 5000 INJECTION INTRAVENOUS; SUBCUTANEOUS at 22:03

## 2021-08-11 RX ADMIN — SODIUM CHLORIDE 4 MILLILITER(S): 9 INJECTION INTRAMUSCULAR; INTRAVENOUS; SUBCUTANEOUS at 15:19

## 2021-08-11 RX ADMIN — ALBUTEROL 2.5 MILLIGRAM(S): 90 AEROSOL, METERED ORAL at 15:18

## 2021-08-11 RX ADMIN — OXYCODONE HYDROCHLORIDE 10 MILLIGRAM(S): 5 TABLET ORAL at 18:09

## 2021-08-11 RX ADMIN — HEPARIN SODIUM 5000 UNIT(S): 5000 INJECTION INTRAVENOUS; SUBCUTANEOUS at 06:26

## 2021-08-11 RX ADMIN — OXYCODONE HYDROCHLORIDE 10 MILLIGRAM(S): 5 TABLET ORAL at 19:21

## 2021-08-11 RX ADMIN — Medication 650 MILLIGRAM(S): at 18:09

## 2021-08-11 RX ADMIN — OXYCODONE HYDROCHLORIDE 10 MILLIGRAM(S): 5 TABLET ORAL at 12:30

## 2021-08-11 RX ADMIN — LIDOCAINE 1 PATCH: 4 CREAM TOPICAL at 01:25

## 2021-08-11 RX ADMIN — OXYCODONE HYDROCHLORIDE 10 MILLIGRAM(S): 5 TABLET ORAL at 06:51

## 2021-08-11 RX ADMIN — GABAPENTIN 200 MILLIGRAM(S): 400 CAPSULE ORAL at 14:01

## 2021-08-11 RX ADMIN — SODIUM CHLORIDE 4 MILLILITER(S): 9 INJECTION INTRAMUSCULAR; INTRAVENOUS; SUBCUTANEOUS at 09:41

## 2021-08-11 RX ADMIN — GABAPENTIN 200 MILLIGRAM(S): 400 CAPSULE ORAL at 22:22

## 2021-08-11 RX ADMIN — Medication 650 MILLIGRAM(S): at 14:00

## 2021-08-11 RX ADMIN — DORNASE ALFA 2.5 MILLIGRAM(S): 1 SOLUTION RESPIRATORY (INHALATION) at 09:42

## 2021-08-11 RX ADMIN — Medication 650 MILLIGRAM(S): at 18:46

## 2021-08-11 RX ADMIN — ALBUTEROL 2.5 MILLIGRAM(S): 90 AEROSOL, METERED ORAL at 09:41

## 2021-08-11 RX ADMIN — ALBUTEROL 2.5 MILLIGRAM(S): 90 AEROSOL, METERED ORAL at 21:49

## 2021-08-11 RX ADMIN — OXYCODONE HYDROCHLORIDE 5 MILLIGRAM(S): 5 TABLET ORAL at 22:32

## 2021-08-11 RX ADMIN — PANTOPRAZOLE SODIUM 40 MILLIGRAM(S): 20 TABLET, DELAYED RELEASE ORAL at 14:01

## 2021-08-11 NOTE — PROGRESS NOTE ADULT - SUBJECTIVE AND OBJECTIVE BOX
PRATEEK THOMPSON            MRN-[id number]         No Known Allergies                 50 year old male with  PMH of esophageal dysfunction  presents to Presurgical testing with diagnosis of achalasia of cardia scheduled for upper endoscopy robotic assisted right video assisted laparoscopic neck incision, three holes esophagectomy , J tube placement  Patient with reports that he always feels that his food is stuck in his chest and not going down. States that he had several endoscopic procedures but no relief  (22 Jul 2021 10:56)      Procedure:  Flex bronch, R robotic-assisted VATS mobilization of esophagus, robotic-assisted laparoscopic converted to open [2/2 dense adhesions of celiac trunk / lesser curve of stomach to liver], mobilization of gastric conduit, pyloroplasty. Neck incision for completion of proximal anastomosis.  Placement of jejunostomy tube 7/3/2021      Issues:              Achalasia              Postop pain  Chest tube in place  Vocal cord paralysis      PAST MEDICAL & SURGICAL HISTORY:  Achalasia    Shoulder injury  left    History of esophagogastroduodenoscopy (EGD)      ICU Vital Signs Last 24 Hrs  T(C): 37.4 (11 Aug 2021 04:00), Max: 37.9 (10 Aug 2021 16:00)  T(F): 99.3 (11 Aug 2021 04:00), Max: 100.2 (10 Aug 2021 16:00)  HR: 90 (11 Aug 2021 09:00) (76 - 104)  BP: 127/67 (11 Aug 2021 09:00) (97/60 - 136/84)  BP(mean): 83 (11 Aug 2021 09:00) (69 - 99)  ABP: --  ABP(mean): --  RR: 16 (11 Aug 2021 08:00) (11 - 24)  SpO2: 78% (11 Aug 2021 09:00) (78% - 100%)    I&O's Detail    10 Aug 2021 07:01  -  11 Aug 2021 07:00  --------------------------------------------------------  IN:    Enteral Tube Flush: 120 mL    TwoCal HN: 927 mL  Total IN: 1047 mL    OUT:    Chest Tube (mL): 30 mL    Voided (mL): 1300 mL  Total OUT: 1330 mL    Total NET: -283 mL      11 Aug 2021 07:01  -  11 Aug 2021 09:45  --------------------------------------------------------  IN:    Enteral Tube Flush: 30 mL    TwoCal HN: 165 mL  Total IN: 195 mL    OUT:    Voided (mL): 400 mL  Total OUT: 400 mL    Total NET: -205 mL        CAPILLARY BLOOD GLUCOSE          Home Medications:  none as per patient:  (22 Jul 2021 11:00)      MEDICATIONS  (STANDING):  acetaminophen    Suspension .. 650 milliGRAM(s) Oral every 6 hours  ALBUTerol    0.083% 2.5 milliGRAM(s) Nebulizer every 6 hours  dornase duarte Solution 2.5 milliGRAM(s) Inhalation two times a day  gabapentin   Solution 200 milliGRAM(s) Oral three times a day  heparin   Injectable 5000 Unit(s) SubCutaneous every 8 hours  lidocaine   5% Patch 1 Patch Transdermal every 24 hours  pantoprazole  Injectable 40 milliGRAM(s) IV Push daily  senna Syrup 10 milliLiter(s) Oral daily  sodium chloride 3%  Inhalation 4 milliLiter(s) Inhalation every 6 hours    MEDICATIONS  (PRN):  bisacodyl Suppository 10 milliGRAM(s) Rectal daily PRN Constipation  HYDROmorphone  Injectable 0.5 milliGRAM(s) IV Push every 4 hours PRN Severe Pain (7 - 10)  naloxone Injectable 0.1 milliGRAM(s) IV Push every 3 minutes PRN For ANY of the following changes in patient status:  A. RR LESS THAN 10 breaths per minute, B. Oxygen saturation LESS THAN 90%, C. Sedation score of 6  ondansetron Injectable 4 milliGRAM(s) IV Push every 6 hours PRN Nausea  oxyCODONE    Solution 5 milliGRAM(s) Oral every 4 hours PRN Mild Pain (1 - 3)  oxyCODONE    Solution 10 milliGRAM(s) Oral every 4 hours PRN Moderate Pain (4 - 6)      Physical exam:   General:               Pt is awake, alert, not in any distress                                                  Neuro:                  Nonfocal                             Cardiovascular:   S1 & S2, regular                           Respiratory:         Air entry is fair and equal on both sides, has bilateral conducted sounds                           GI:                          Soft, nondistended and nontender, Bowel sounds active. Has abdominal incision hematoma                            Ext:                        No cyanosis or edema                              Labs:                                                                           9.2    15.94 )-----------( 317      ( 11 Aug 2021 04:01 )             29.1             08-11    135  |  98  |  16  ----------------------------<  78  4.7   |  25  |  0.89    Ca    9.6      11 Aug 2021 04:01  Phos  3.1     08-11  Mg     2.00     08-11                    CXR:  < from: Xray Chest 1 View- PORTABLE-Routine (Xray Chest 1 View- PORTABLE-Routine in AM.) (08.10.21 @ 05:55) >  Since the last exam, the enteric tube has been removed. Skin staples again seen overlying left apex.    Right-sided chest tube in place.    Lungs show no focal consolidations, effusions or pneumothorax. Right paracardiac lucency likely gastric pull-through.      COMPARISON:  August 9      IMPRESSION:  Follow-up post thoracotomy with chest tube.        Plan:    General:   50yMale s/p Flex bronch, R robotic-assisted VATS mobilization of esophagus, robotic-assisted laparoscopic converted to open [2/2 dense adhesions of celiac trunk / lesser curve of stomach to liver], mobilization of gastric conduit, pyloroplasty. Neck incision for completion of proximal anastomosis.  Placement of jejunostomy tube 7/3/2021. Remained intubated and on vent support.                             Neuro:                                          Pain control with Oxy / Tylenol / Neurontin     ENT scoped yesterday and found L-vocal cord  paralyzed                             Cardiovascular:                                           Continue hemodynamic monitoring.                                                                   Respiratory:                                          Pt is on RA      Chest tube D/CD. Monitor Donell output                                                               Continue bronchodilators, pulmonary toilet                            GI                                          On clears with tube feeds.       Aspiration precautions / chin tucking maneuver as recommended by speech pathologist                                          Continue GI prophylaxis with  Protonix                                          Continue Zofran / Reglan for nausea - PRN                                          Flush  J-tube with 20cc of sterile water Q 4hrs.        Tolerating J-tube feeds, Goal 2CalHN @55cc/hr/ 16hrs	                                                                 Renal:                                          Monitor I/Os and electrolytes                                                                                         Hem/ Onc:                                                                                   Monitor chest tube output &  signs of bleeding.                                           Follow CBC in AM                           Infectious disease:                                             Monitor for fever / leukocytosis.                                           All surgical incision / chest tube  sites look clean                            Endocrine                                              Continue Accu-Checks with coverage    Pt is on SQ Heparin and Venodyne boots for DVT prophylaxis.     Pertinent clinical, laboratory, radiographic, hemodynamic, echocardiographic, respiratory data, microbiologic data and chart were reviewed and analyzed frequently throughout the course of the day and night  Patient seen, examined and plan discussed with CT Surgeon Dr. Goodman  / CTICU team during rounds.    Status discussed with patient  / updated plan of care          Austin Escobar MD

## 2021-08-12 LAB
ANION GAP SERPL CALC-SCNC: 21 MMOL/L — HIGH (ref 7–14)
BASOPHILS # BLD AUTO: 0.07 K/UL — SIGNIFICANT CHANGE UP (ref 0–0.2)
BASOPHILS NFR BLD AUTO: 0.4 % — SIGNIFICANT CHANGE UP (ref 0–2)
BUN SERPL-MCNC: 17 MG/DL — SIGNIFICANT CHANGE UP (ref 7–23)
CALCIUM SERPL-MCNC: 9.7 MG/DL — SIGNIFICANT CHANGE UP (ref 8.4–10.5)
CHLORIDE SERPL-SCNC: 93 MMOL/L — LOW (ref 98–107)
CO2 SERPL-SCNC: 19 MMOL/L — LOW (ref 22–31)
CREAT SERPL-MCNC: 0.85 MG/DL — SIGNIFICANT CHANGE UP (ref 0.5–1.3)
EOSINOPHIL # BLD AUTO: 0.52 K/UL — HIGH (ref 0–0.5)
EOSINOPHIL NFR BLD AUTO: 3 % — SIGNIFICANT CHANGE UP (ref 0–6)
GLUCOSE SERPL-MCNC: 91 MG/DL — SIGNIFICANT CHANGE UP (ref 70–99)
HCT VFR BLD CALC: 33.8 % — LOW (ref 39–50)
HGB BLD-MCNC: 10.6 G/DL — LOW (ref 13–17)
IANC: 13.51 K/UL — HIGH (ref 1.5–8.5)
IMM GRANULOCYTES NFR BLD AUTO: 0.9 % — SIGNIFICANT CHANGE UP (ref 0–1.5)
LYMPHOCYTES # BLD AUTO: 1.88 K/UL — SIGNIFICANT CHANGE UP (ref 1–3.3)
LYMPHOCYTES # BLD AUTO: 10.9 % — LOW (ref 13–44)
MAGNESIUM SERPL-MCNC: 2.2 MG/DL — SIGNIFICANT CHANGE UP (ref 1.6–2.6)
MCHC RBC-ENTMCNC: 29 PG — SIGNIFICANT CHANGE UP (ref 27–34)
MCHC RBC-ENTMCNC: 31.4 GM/DL — LOW (ref 32–36)
MCV RBC AUTO: 92.6 FL — SIGNIFICANT CHANGE UP (ref 80–100)
MONOCYTES # BLD AUTO: 1.83 K/UL — HIGH (ref 0–0.9)
MONOCYTES NFR BLD AUTO: 10.6 % — SIGNIFICANT CHANGE UP (ref 2–14)
NEUTROPHILS # BLD AUTO: 12.8 K/UL — HIGH (ref 1.8–7.4)
NEUTROPHILS NFR BLD AUTO: 74.2 % — SIGNIFICANT CHANGE UP (ref 43–77)
NRBC # BLD: 0 /100 WBCS — SIGNIFICANT CHANGE UP
NRBC # FLD: 0 K/UL — SIGNIFICANT CHANGE UP
PHOSPHATE SERPL-MCNC: 3.7 MG/DL — SIGNIFICANT CHANGE UP (ref 2.5–4.5)
PLATELET # BLD AUTO: 452 K/UL — HIGH (ref 150–400)
POTASSIUM SERPL-MCNC: 4.9 MMOL/L — SIGNIFICANT CHANGE UP (ref 3.5–5.3)
POTASSIUM SERPL-SCNC: 4.9 MMOL/L — SIGNIFICANT CHANGE UP (ref 3.5–5.3)
RBC # BLD: 3.65 M/UL — LOW (ref 4.2–5.8)
RBC # FLD: 12.7 % — SIGNIFICANT CHANGE UP (ref 10.3–14.5)
SODIUM SERPL-SCNC: 133 MMOL/L — LOW (ref 135–145)
WBC # BLD: 18.22 K/UL — HIGH (ref 3.8–10.5)
WBC # FLD AUTO: 18.22 K/UL — HIGH (ref 3.8–10.5)

## 2021-08-12 PROCEDURE — 71045 X-RAY EXAM CHEST 1 VIEW: CPT | Mod: 26

## 2021-08-12 PROCEDURE — 71260 CT THORAX DX C+: CPT | Mod: 26

## 2021-08-12 PROCEDURE — 74177 CT ABD & PELVIS W/CONTRAST: CPT | Mod: 26

## 2021-08-12 RX ADMIN — ALBUTEROL 2.5 MILLIGRAM(S): 90 AEROSOL, METERED ORAL at 03:32

## 2021-08-12 RX ADMIN — GABAPENTIN 200 MILLIGRAM(S): 400 CAPSULE ORAL at 13:01

## 2021-08-12 RX ADMIN — OXYCODONE HYDROCHLORIDE 10 MILLIGRAM(S): 5 TABLET ORAL at 16:36

## 2021-08-12 RX ADMIN — SODIUM CHLORIDE 4 MILLILITER(S): 9 INJECTION INTRAMUSCULAR; INTRAVENOUS; SUBCUTANEOUS at 11:05

## 2021-08-12 RX ADMIN — LIDOCAINE 1 PATCH: 4 CREAM TOPICAL at 01:28

## 2021-08-12 RX ADMIN — ALBUTEROL 2.5 MILLIGRAM(S): 90 AEROSOL, METERED ORAL at 11:06

## 2021-08-12 RX ADMIN — Medication 650 MILLIGRAM(S): at 17:25

## 2021-08-12 RX ADMIN — PANTOPRAZOLE SODIUM 40 MILLIGRAM(S): 20 TABLET, DELAYED RELEASE ORAL at 13:02

## 2021-08-12 RX ADMIN — Medication 650 MILLIGRAM(S): at 00:25

## 2021-08-12 RX ADMIN — HEPARIN SODIUM 5000 UNIT(S): 5000 INJECTION INTRAVENOUS; SUBCUTANEOUS at 13:02

## 2021-08-12 RX ADMIN — SODIUM CHLORIDE 4 MILLILITER(S): 9 INJECTION INTRAMUSCULAR; INTRAVENOUS; SUBCUTANEOUS at 22:58

## 2021-08-12 RX ADMIN — Medication 650 MILLIGRAM(S): at 13:01

## 2021-08-12 RX ADMIN — Medication 650 MILLIGRAM(S): at 06:23

## 2021-08-12 RX ADMIN — OXYCODONE HYDROCHLORIDE 10 MILLIGRAM(S): 5 TABLET ORAL at 22:29

## 2021-08-12 RX ADMIN — HEPARIN SODIUM 5000 UNIT(S): 5000 INJECTION INTRAVENOUS; SUBCUTANEOUS at 05:55

## 2021-08-12 RX ADMIN — GABAPENTIN 200 MILLIGRAM(S): 400 CAPSULE ORAL at 05:53

## 2021-08-12 RX ADMIN — DORNASE ALFA 2.5 MILLIGRAM(S): 1 SOLUTION RESPIRATORY (INHALATION) at 11:04

## 2021-08-12 RX ADMIN — OXYCODONE HYDROCHLORIDE 10 MILLIGRAM(S): 5 TABLET ORAL at 21:59

## 2021-08-12 RX ADMIN — DORNASE ALFA 2.5 MILLIGRAM(S): 1 SOLUTION RESPIRATORY (INHALATION) at 23:01

## 2021-08-12 RX ADMIN — Medication 650 MILLIGRAM(S): at 05:54

## 2021-08-12 RX ADMIN — SODIUM CHLORIDE 4 MILLILITER(S): 9 INJECTION INTRAMUSCULAR; INTRAVENOUS; SUBCUTANEOUS at 03:32

## 2021-08-12 RX ADMIN — Medication 650 MILLIGRAM(S): at 01:28

## 2021-08-12 RX ADMIN — GABAPENTIN 200 MILLIGRAM(S): 400 CAPSULE ORAL at 21:59

## 2021-08-12 RX ADMIN — SENNA PLUS 10 MILLILITER(S): 8.6 TABLET ORAL at 13:02

## 2021-08-12 RX ADMIN — ALBUTEROL 2.5 MILLIGRAM(S): 90 AEROSOL, METERED ORAL at 22:58

## 2021-08-12 RX ADMIN — HYDROMORPHONE HYDROCHLORIDE 0.5 MILLIGRAM(S): 2 INJECTION INTRAMUSCULAR; INTRAVENOUS; SUBCUTANEOUS at 08:45

## 2021-08-12 NOTE — PROGRESS NOTE ADULT - SUBJECTIVE AND OBJECTIVE BOX
THORACIC SURGERY PROGRESS NOTE    s/p 3-Hole Esophagogastrectomy 8/3, POD #9      Subjective: no events overnight, patient has no new complaints.   White count increased today to 18.2 from 15.9, no fevers  Tolerating nocturnal tube feeds      PMHx: Achalasia   Social Hx:       VITAL SIGNS:  T(C): 36.9 (08-12-21 @ 07:51), Max: 37.6 (08-11-21 @ 12:00)  HR: 90 (08-12-21 @ 11:10) (71 - 94)  BP: 117/60 (08-12-21 @ 07:51) (117/60 - 133/53)  RR: 18 (08-12-21 @ 07:51) (15 - 18)  SpO2: 97% (08-12-21 @ 11:10) (96% - 100%)      LABS:                        10.6   18.22 )-----------( 452      ( 12 Aug 2021 07:17 )             33.8     08-12    133<L>  |  93<L>  |  17  ----------------------------<  91  4.9   |  19<L>  |  0.85    Ca    9.7      12 Aug 2021 07:17  Phos  3.7     08-12  Mg     2.20     08-12        MEDICATIONS:  MEDICATIONS  (STANDING):  acetaminophen    Suspension .. 650 milliGRAM(s) Oral every 6 hours  ALBUTerol    0.083% 2.5 milliGRAM(s) Nebulizer every 6 hours  dornase duarte Solution 2.5 milliGRAM(s) Inhalation two times a day  gabapentin   Solution 200 milliGRAM(s) Oral three times a day  heparin   Injectable 5000 Unit(s) SubCutaneous every 8 hours  pantoprazole  Injectable 40 milliGRAM(s) IV Push daily  senna Syrup 10 milliLiter(s) Oral daily  sodium chloride 3%  Inhalation 4 milliLiter(s) Inhalation every 6 hours    MEDICATIONS  (PRN):  bisacodyl Suppository 10 milliGRAM(s) Rectal daily PRN Constipation  HYDROmorphone  Injectable 0.5 milliGRAM(s) IV Push every 4 hours PRN Severe Pain (7 - 10)  naloxone Injectable 0.1 milliGRAM(s) IV Push every 3 minutes PRN For ANY of the following changes in patient status:  A. RR LESS THAN 10 breaths per minute, B. Oxygen saturation LESS THAN 90%, C. Sedation score of 6  ondansetron Injectable 4 milliGRAM(s) IV Push every 6 hours PRN Nausea  oxyCODONE    Solution 5 milliGRAM(s) Oral every 4 hours PRN Mild Pain (1 - 3)  oxyCODONE    Solution 10 milliGRAM(s) Oral every 4 hours PRN Moderate Pain (4 - 6)        PHYSICAL EXAM:   General: well developed, well nourished, NAD  Neuro: awake, alert, responds to commands, nonfocal, LOERA x 4  Eyes: scleras clear b/l, PERRLA/ EOMI, Gross vision intact  ENT: Gross hearing intact, grossly patent pharynx, no stridor  Neck: Neck supple, trachea midline, No JVD. neck incision is clean, staples intact, no skin erythema, no drainage  Respiratory: CTA B/L, No wheezing, rales, rhonchi  CV: RRR, +S1 +S2, no murmurs, rubs or gallops  Abdominal: Soft, NT, ND, no rebound, no guarding, + bowel sounds, midline incision with staples, no drainage, no skin erythema, stable SQ hematoma  Extremities: No edema, + peripheral pulses  Skin: No Rashes, Hematoma, Ecchymosis  Lymphatic: No Neck, axilla, groin LAD  Psych: Oriented x 3, normal affect      Tubes: Right chest esau to waterseal, 80cc drainage

## 2021-08-13 ENCOUNTER — TRANSCRIPTION ENCOUNTER (OUTPATIENT)
Age: 51
End: 2021-08-13

## 2021-08-13 ENCOUNTER — APPOINTMENT (OUTPATIENT)
Dept: THORACIC SURGERY | Facility: HOSPITAL | Age: 51
End: 2021-08-13

## 2021-08-13 LAB
ANION GAP SERPL CALC-SCNC: 14 MMOL/L — SIGNIFICANT CHANGE UP (ref 7–14)
APTT BLD: 28.7 SEC — SIGNIFICANT CHANGE UP (ref 27–36.3)
BASOPHILS # BLD AUTO: 0.06 K/UL — SIGNIFICANT CHANGE UP (ref 0–0.2)
BASOPHILS NFR BLD AUTO: 0.4 % — SIGNIFICANT CHANGE UP (ref 0–2)
BLD GP AB SCN SERPL QL: NEGATIVE — SIGNIFICANT CHANGE UP
BUN SERPL-MCNC: 16 MG/DL — SIGNIFICANT CHANGE UP (ref 7–23)
CALCIUM SERPL-MCNC: 10.5 MG/DL — SIGNIFICANT CHANGE UP (ref 8.4–10.5)
CHLORIDE SERPL-SCNC: 94 MMOL/L — LOW (ref 98–107)
CO2 SERPL-SCNC: 26 MMOL/L — SIGNIFICANT CHANGE UP (ref 22–31)
CREAT SERPL-MCNC: 0.97 MG/DL — SIGNIFICANT CHANGE UP (ref 0.5–1.3)
EOSINOPHIL # BLD AUTO: 0.29 K/UL — SIGNIFICANT CHANGE UP (ref 0–0.5)
EOSINOPHIL NFR BLD AUTO: 1.9 % — SIGNIFICANT CHANGE UP (ref 0–6)
GLUCOSE SERPL-MCNC: 115 MG/DL — HIGH (ref 70–99)
HCT VFR BLD CALC: 31.5 % — LOW (ref 39–50)
HGB BLD-MCNC: 10.1 G/DL — LOW (ref 13–17)
IANC: 11.33 K/UL — HIGH (ref 1.5–8.5)
IMM GRANULOCYTES NFR BLD AUTO: 1 % — SIGNIFICANT CHANGE UP (ref 0–1.5)
INR BLD: 1.27 RATIO — HIGH (ref 0.88–1.16)
LYMPHOCYTES # BLD AUTO: 1.53 K/UL — SIGNIFICANT CHANGE UP (ref 1–3.3)
LYMPHOCYTES # BLD AUTO: 10.2 % — LOW (ref 13–44)
MCHC RBC-ENTMCNC: 29.1 PG — SIGNIFICANT CHANGE UP (ref 27–34)
MCHC RBC-ENTMCNC: 32.1 GM/DL — SIGNIFICANT CHANGE UP (ref 32–36)
MCV RBC AUTO: 90.8 FL — SIGNIFICANT CHANGE UP (ref 80–100)
MONOCYTES # BLD AUTO: 1.57 K/UL — HIGH (ref 0–0.9)
MONOCYTES NFR BLD AUTO: 10.5 % — SIGNIFICANT CHANGE UP (ref 2–14)
NEUTROPHILS # BLD AUTO: 11.33 K/UL — HIGH (ref 1.8–7.4)
NEUTROPHILS NFR BLD AUTO: 76 % — SIGNIFICANT CHANGE UP (ref 43–77)
NRBC # BLD: 0 /100 WBCS — SIGNIFICANT CHANGE UP
NRBC # FLD: 0 K/UL — SIGNIFICANT CHANGE UP
PLATELET # BLD AUTO: 631 K/UL — HIGH (ref 150–400)
POTASSIUM SERPL-MCNC: 5.2 MMOL/L — SIGNIFICANT CHANGE UP (ref 3.5–5.3)
POTASSIUM SERPL-SCNC: 5.2 MMOL/L — SIGNIFICANT CHANGE UP (ref 3.5–5.3)
PROTHROM AB SERPL-ACNC: 14.4 SEC — HIGH (ref 10.6–13.6)
RBC # BLD: 3.47 M/UL — LOW (ref 4.2–5.8)
RBC # FLD: 12.4 % — SIGNIFICANT CHANGE UP (ref 10.3–14.5)
RH IG SCN BLD-IMP: POSITIVE — SIGNIFICANT CHANGE UP
SARS-COV-2 RNA SPEC QL NAA+PROBE: SIGNIFICANT CHANGE UP
SODIUM SERPL-SCNC: 134 MMOL/L — LOW (ref 135–145)
WBC # BLD: 14.93 K/UL — HIGH (ref 3.8–10.5)
WBC # FLD AUTO: 14.93 K/UL — HIGH (ref 3.8–10.5)

## 2021-08-13 PROCEDURE — 31624 DX BRONCHOSCOPE/LAVAGE: CPT

## 2021-08-13 PROCEDURE — 71045 X-RAY EXAM CHEST 1 VIEW: CPT | Mod: 26

## 2021-08-13 PROCEDURE — 43235 EGD DIAGNOSTIC BRUSH WASH: CPT

## 2021-08-13 PROCEDURE — 31645 BRNCHSC W/THER ASPIR 1ST: CPT

## 2021-08-13 RX ORDER — NYSTATIN CREAM 100000 [USP'U]/G
1 CREAM TOPICAL THREE TIMES A DAY
Refills: 0 | Status: DISCONTINUED | OUTPATIENT
Start: 2021-08-13 | End: 2021-08-18

## 2021-08-13 RX ADMIN — HYDROMORPHONE HYDROCHLORIDE 0.5 MILLIGRAM(S): 2 INJECTION INTRAMUSCULAR; INTRAVENOUS; SUBCUTANEOUS at 22:25

## 2021-08-13 RX ADMIN — GABAPENTIN 200 MILLIGRAM(S): 400 CAPSULE ORAL at 05:00

## 2021-08-13 RX ADMIN — SODIUM CHLORIDE 4 MILLILITER(S): 9 INJECTION INTRAMUSCULAR; INTRAVENOUS; SUBCUTANEOUS at 15:50

## 2021-08-13 RX ADMIN — HEPARIN SODIUM 5000 UNIT(S): 5000 INJECTION INTRAVENOUS; SUBCUTANEOUS at 13:22

## 2021-08-13 RX ADMIN — HYDROMORPHONE HYDROCHLORIDE 0.5 MILLIGRAM(S): 2 INJECTION INTRAMUSCULAR; INTRAVENOUS; SUBCUTANEOUS at 16:27

## 2021-08-13 RX ADMIN — DORNASE ALFA 2.5 MILLIGRAM(S): 1 SOLUTION RESPIRATORY (INHALATION) at 22:57

## 2021-08-13 RX ADMIN — HYDROMORPHONE HYDROCHLORIDE 0.5 MILLIGRAM(S): 2 INJECTION INTRAMUSCULAR; INTRAVENOUS; SUBCUTANEOUS at 16:03

## 2021-08-13 RX ADMIN — SODIUM CHLORIDE 4 MILLILITER(S): 9 INJECTION INTRAMUSCULAR; INTRAVENOUS; SUBCUTANEOUS at 22:54

## 2021-08-13 RX ADMIN — HYDROMORPHONE HYDROCHLORIDE 0.5 MILLIGRAM(S): 2 INJECTION INTRAMUSCULAR; INTRAVENOUS; SUBCUTANEOUS at 08:17

## 2021-08-13 RX ADMIN — Medication 650 MILLIGRAM(S): at 17:06

## 2021-08-13 RX ADMIN — PANTOPRAZOLE SODIUM 40 MILLIGRAM(S): 20 TABLET, DELAYED RELEASE ORAL at 13:48

## 2021-08-13 RX ADMIN — Medication 650 MILLIGRAM(S): at 01:33

## 2021-08-13 RX ADMIN — SODIUM CHLORIDE 4 MILLILITER(S): 9 INJECTION INTRAMUSCULAR; INTRAVENOUS; SUBCUTANEOUS at 03:20

## 2021-08-13 RX ADMIN — HYDROMORPHONE HYDROCHLORIDE 0.5 MILLIGRAM(S): 2 INJECTION INTRAMUSCULAR; INTRAVENOUS; SUBCUTANEOUS at 08:37

## 2021-08-13 RX ADMIN — Medication 650 MILLIGRAM(S): at 05:00

## 2021-08-13 RX ADMIN — HEPARIN SODIUM 5000 UNIT(S): 5000 INJECTION INTRAVENOUS; SUBCUTANEOUS at 21:54

## 2021-08-13 RX ADMIN — GABAPENTIN 200 MILLIGRAM(S): 400 CAPSULE ORAL at 21:55

## 2021-08-13 RX ADMIN — ALBUTEROL 2.5 MILLIGRAM(S): 90 AEROSOL, METERED ORAL at 03:20

## 2021-08-13 RX ADMIN — ALBUTEROL 2.5 MILLIGRAM(S): 90 AEROSOL, METERED ORAL at 22:54

## 2021-08-13 RX ADMIN — GABAPENTIN 200 MILLIGRAM(S): 400 CAPSULE ORAL at 13:22

## 2021-08-13 RX ADMIN — ALBUTEROL 2.5 MILLIGRAM(S): 90 AEROSOL, METERED ORAL at 15:49

## 2021-08-13 RX ADMIN — SENNA PLUS 10 MILLILITER(S): 8.6 TABLET ORAL at 16:27

## 2021-08-13 RX ADMIN — HYDROMORPHONE HYDROCHLORIDE 0.5 MILLIGRAM(S): 2 INJECTION INTRAMUSCULAR; INTRAVENOUS; SUBCUTANEOUS at 21:55

## 2021-08-13 NOTE — BRIEF OPERATIVE NOTE - NSICDXBRIEFPROCEDURE_GEN_ALL_CORE_FT
PROCEDURES:  Bronchoscopy, with EGD 03-Aug-2021 20:27:37  [place] Ash  
PROCEDURES:  Zabrina esophagectomy 03-Aug-2021 20:27:06  [place] Ash  Flexible bronchoscopy 03-Aug-2021 20:27:15  [place] Ash  Jejunostomy tube placement 03-Aug-2021 20:27:28  [place] Ash  Bronchoscopy, with EGD 03-Aug-2021 20:27:37  [place]ino Ash  Exploratory laparotomy 03-Aug-2021 20:27:48  [place]ino Ash

## 2021-08-13 NOTE — DISCHARGE NOTE PROVIDER - NSDCFUADDINST_GEN_ALL_CORE_FT
Walk 4-5x per day. Increase as tolerated. You may climb stairs. Continue to use incentive spirometer.   You may shower daily. Pat dry feeding tube site gently after each shower and replace gauze dressing.

## 2021-08-13 NOTE — DISCHARGE NOTE PROVIDER - NSDCACTIVITY_GEN_ALL_CORE
Do not drive or operate machinery/Showering allowed/Do not make important decisions/Stairs allowed/Walking - Indoors allowed/No heavy lifting/straining/Walking - Outdoors allowed Sex allowed/Do not drive or operate machinery/Showering allowed/Do not make important decisions/Stairs allowed/Walking - Indoors allowed/No heavy lifting/straining/Walking - Outdoors allowed

## 2021-08-13 NOTE — DISCHARGE NOTE PROVIDER - HOSPITAL COURSE
50 year old male with  PMH of esophageal dysfunction presented to Presurgical testing 8/3 with diagnosis of achalasia of cardia scheduled for upper endoscopy robotic assisted right video assisted laparoscopic neck incision, three holes esophagectomy, J tube placement  Patient with reports that he always feels that his food is stuck in his chest and not going down. States that he had several endoscopic procedures but no relief.     S/p /p 3-Hole Esophagogastrectomy with exlap, R VATS, j tube placement and bronch on 8/3. Esophagram done on 8/9/21, negative for leak but silent aspiration with   nectar thick and thin liquids unless using Chin tick and double swallow technique.  8/10 ENT scoped, left vocal cord not moving.  8/12/21 Leukocytosis and patient had repeat bronch on 8/13 with EGD. No signs of leak noted.    Developed fever 8/15 to 102 with fluctuating leukocytosis, ID consulted and Zosyn started.  Repeat barium swallow done on 8/16/21 showing questionable contained leak.  Patient remains NPO, can be d/c'ed on Augmentin.  Patient to have repeat barium swallow prior to follow up appointment with Dr. Goodman on 8/30/21.    50 year old male with  PMH of esophageal dysfunction presented to Presurgical testing 8/3 with diagnosis of achalasia of cardia scheduled for upper endoscopy robotic assisted right video assisted laparoscopic neck incision, three holes esophagectomy, J tube placement  Patient with reports that he always feels that his food is stuck in his chest and not going down. States that he had several endoscopic procedures but no relief.     S/p /p 3-Hole Esophagogastrectomy with exlap, R VATS, j tube placement and bronch on 8/3. Esophagram done on 8/9/21, negative for leak but silent aspiration with   nectar thick and thin liquids unless using Chin tick and double swallow technique. 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  8/12/21 Leukocytosis and patient had repeat bronch on 8/13 with EGD. No signs of leak noted.  Developed fever 8/15 to 102 with fluctuating leukocytosis, ID consulted and Zosyn started.  Repeat barium swallow done on 8/16/21 showing questionable contained leak.  Patient remains NPO, can be d/c'ed on Augmentin.  Patient to have repeat barium swallow prior to follow up appointment with Dr. Goodman on 8/30/21.    50 year old male with  PMH of esophageal dysfunction presented to Presurgical testing 8/3 with diagnosis of achalasia of cardia scheduled for upper endoscopy robotic assisted right video assisted laparoscopic neck incision, three holes esophagectomy, J tube placement  Patient with reports that he always feels that his food is stuck in his chest and not going down. States that he had several endoscopic procedures but no relief.     S/p /p 3-Hole Esophagogastrectomy with exlap, R VATS, j tube placement and bronch on 8/3. Esophagram done on 8/9/21, negative for leak but silent aspiration with   nectar thick and thin liquids unless using Chin tick and double swallow technique. 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  8/12/21 Leukocytosis and patient had repeat bronch on 8/13 with EGD. No signs of leak noted.  Developed fever 8/15 to 102 with fluctuating leukocytosis, ID consulted and Zosyn started.  Repeat barium swallow done on 8/16/21 showing questionable contained leak.  Patient remains NPO, can be d/c'ed on Augmentin for 14 day course in total. Pt with no leukocytosis, no fever for over 48hrs prior to discharge. All teaching related to J tube flushes  and Lovenox injection done w pt with understanding. All home care and equiptment arranged and delivered for home.  Pt c/o intermittent fullness, bloating, frequent BMs when TF titrated to goal over 16hrs. Pt requesting to remain on 16hr regiment for life style allowances. Pt tolerating TF at 45cc/hr prior to   discharge today without symptoms. Pt in agreement to slowly titrate up feeds to goal rate of 55cc/hr x 16hr over next few days when home. [employer] rep to teach pt/pt's father how to manage  feeding pump once pt arrives at home today.  Pt feels well. No CP or SOB. No n/v. Kept NPO. All wounds to neck, abd and chest c/d/i. Staples removed. Lungs CTA. J tube site c/d/i.   Abd soft, + soft BM overnight.  Patient to have repeat barium swallow prior to follow up appointment with Dr. Goodman on 8/30/21. Cleared for d/c to home by Dr. Goodman.   Vital Signs Last 24 Hrs  T(C): 36.5 (18 Aug 2021 12:00), Max: 37.4 (17 Aug 2021 20:27)  T(F): 97.7 (18 Aug 2021 12:00), Max: 99.3 (17 Aug 2021 20:27)  HR: 70 (18 Aug 2021 12:00) (70 - 90)  BP: 105/71 (18 Aug 2021 12:00) (98/49 - 112/63)  BP(mean): --  RR: 17 (18 Aug 2021 12:00) (17 - 18)  SpO2: 100% (18 Aug 2021 12:00) (97% - 100%)

## 2021-08-13 NOTE — DISCHARGE NOTE PROVIDER - DETAILS OF MALNUTRITION DIAGNOSIS/DIAGNOSES
This patient has been assessed with a concern for Malnutrition and was treated during this hospitalization for the following Nutrition diagnosis/diagnoses:     -  08/04/2021: Severe protein-calorie malnutrition

## 2021-08-13 NOTE — DISCHARGE NOTE PROVIDER - NSDCMRMEDTOKEN_GEN_ALL_CORE_FT
Lovenox 40 mg/0.4 mL injectable solution: 40 milligram(s) subcutaneously once a day   none as per patient:   Tube Feed: TwocalHN tube feedings via jejunostomy tube @ 55cc/hr x 16hrs. Flush with 100cc  tap water q6hrs  IVONNE=99  Dx= K22.0  Tube feed equipment: Tube feeding pump: Disp #1  IV Pole: Disp #1  Tube feeding bag: Disp #30  IVONNE=99  Dx: K22.0     acetaminophen 160 mg/5 mL oral suspension: 650 milligram(s) by jejunostomy tube every 6 hours, As Needed  amoxicillin-clavulanate 400 mg-57 mg/5 mL oral liquid: 10 milliliter(s) by jejunostomy tube every 12 hours  x 11 days   famotidine 40 mg/5 mL oral suspension: 5 milliliter(s) by jejunostomy tube once a day (at bedtime)   Lovenox 40 mg/0.4 mL injectable solution: 40 milligram(s) subcutaneously once a day x 14 more days  oxyCODONE 5 mg/5 mL oral solution: 5 milliliter(s) by jejunostomy tube every 4 hours, As Needed for moderate to severe pain. MDD:30ml   senna 8.8 mg/5 mL oral syrup: 10 milliliter(s) by jejunostomy tube once a day, As Needed only if you become constipated.

## 2021-08-13 NOTE — DISCHARGE NOTE PROVIDER - CARE PROVIDER_API CALL
Anh Goodman)  Surgery; Thoracic Surgery  [phone] [place] [address]  Phone: [phone]  Fax: [phone]  Follow Up Time:

## 2021-08-13 NOTE — BRIEF OPERATIVE NOTE - NSICDXBRIEFPOSTOP_GEN_ALL_CORE_FT
POST-OP DIAGNOSIS:  Achalasia of esophagus 03-Aug-2021 20:28:10  Chino Ash  
POST-OP DIAGNOSIS:  Achalasia of esophagus 03-Aug-2021 20:28:10  Chino Ash

## 2021-08-13 NOTE — CHART NOTE - NSCHARTNOTEFT_GEN_A_CORE
Patient s/p flex bronch, EGD, patient resting comfortably.  Incisions clean and dry.  J tube in place, and esau drain to pleurovac with minimal drainage noted.    Vital Signs Last 24 Hrs  T(C): 37.6 (13 Aug 2021 20:00), Max: 37.6 (13 Aug 2021 20:00)  T(F): 99.6 (13 Aug 2021 20:00), Max: 99.6 (13 Aug 2021 20:00)  HR: 87 (13 Aug 2021 20:00) (71 - 100)  BP: 114/72 (13 Aug 2021 20:00) (111/73 - 128/70)  BP(mean): 82 (13 Aug 2021 12:00) (77 - 84)  RR: 18 (13 Aug 2021 20:00) (12 - 19)  SpO2: 93% (13 Aug 2021 20:00) (93% - 100%)    I&O's Detail    12 Aug 2021 07:01  -  13 Aug 2021 07:00  --------------------------------------------------------  IN:    Enteral Tube Flush: 120 mL  Total IN: 120 mL    OUT:    Chest Tube (mL): 50 mL    Voided (mL): 1800 mL  Total OUT: 1850 mL    Total NET: -1730 mL      13 Aug 2021 07:01  -  13 Aug 2021 20:59  --------------------------------------------------------  IN:    Enteral Tube Flush: 30 mL    TwoCal HN: 110 mL  Total IN: 140 mL    OUT:    Chest Tube (mL): 25 mL    Oral Fluid: 0 mL    Voided (mL): 900 mL  Total OUT: 925 mL    Total NET: -785 mL      MEDICATIONS  (STANDING):  acetaminophen    Suspension .. 650 milliGRAM(s) Oral every 6 hours  ALBUTerol    0.083% 2.5 milliGRAM(s) Nebulizer every 6 hours  dornase duarte Solution 2.5 milliGRAM(s) Inhalation two times a day  gabapentin   Solution 200 milliGRAM(s) Oral three times a day  heparin   Injectable 5000 Unit(s) SubCutaneous every 8 hours  nystatin Powder 1 Application(s) Topical three times a day  pantoprazole  Injectable 40 milliGRAM(s) IV Push daily  senna Syrup 10 milliLiter(s) Oral daily  sodium chloride 3%  Inhalation 4 milliLiter(s) Inhalation every 6 hours    A/P: S/P Flex Bronch/ EGD  S/p flex bronch, R robotic-assisted VATS mobilization of esophagus, robotic-assisted laparoscopic converted to open [2/2 dense adhesions of celiac trunk / lesser curve of stomach to liver], mobilization of gastric conduit, pyloroplasty. Neck incision for completion of proximal anastomosis.  Placement of jejunostomy tube.  Continue nocturnal feeds via J tube  Continue Esau drain   Pain management

## 2021-08-13 NOTE — DISCHARGE NOTE PROVIDER - NSDCFUADDAPPT_GEN_ALL_CORE_FT
Follow up with Dr. Goodman on Monday 8/30/21  Barium swallow study prior to follow up appointment with Dr. Goodman  Follow up with Dr. Goodman on Monday 8/30/21. Call to make that apt and to confirm and arrange with office having a Barium Swallow done.   Barium swallow study prior to follow up appointment with Dr. Goodman.   See your PCP for follow up in 2-3 weeks.

## 2021-08-13 NOTE — DISCHARGE NOTE PROVIDER - NSDCCPCAREPLAN_GEN_ALL_CORE_FT
PRINCIPAL DISCHARGE DIAGNOSIS  Diagnosis: Achalasia of cardia  Assessment and Plan of Treatment:

## 2021-08-13 NOTE — BRIEF OPERATIVE NOTE - OPERATION/FINDINGS
Flexible bronchoscopy - No evidence of purulence or significant secretions in airways. B/l BAL sent for culture.  EGD - Anastomosis at 21cm. Conduit appeared viable without evidence of necrosis or leak. Pyloroplasty viable and widely patent.

## 2021-08-13 NOTE — BRIEF OPERATIVE NOTE - NSICDXBRIEFPREOP_GEN_ALL_CORE_FT
PRE-OP DIAGNOSIS:  Achalasia of esophagus 03-Aug-2021 20:28:01  Chino Ash  
PRE-OP DIAGNOSIS:  Achalasia of esophagus 03-Aug-2021 20:28:01  Chino Ash

## 2021-08-13 NOTE — DISCHARGE NOTE PROVIDER - INSTRUCTIONS
chin-tuck  J-tube: 2Cal @ 55 x 16 hours overnight   J-tube: 2Cal @ 55 x 16 hours overnight Nothing by mouth at all. Can swab mouth only to moisten. J tube feedings of Two Deep HN -for now can run at 45cc/hr for 16hrs a day 6pm to 10am daily but try to titrate up to desired goal of 55cc/hr for 16hrs a day. Give yourself at least 600cc of tap water in total over 24hrs via your J tube in divided doses every few hours. Ideally, titrate up to 1,000cc in a 24hr period. Always flush J tube with some water before and after each med and before and after tube feeds given.

## 2021-08-14 LAB
ANION GAP SERPL CALC-SCNC: 13 MMOL/L — SIGNIFICANT CHANGE UP (ref 7–14)
BUN SERPL-MCNC: 18 MG/DL — SIGNIFICANT CHANGE UP (ref 7–23)
CALCIUM SERPL-MCNC: 9.8 MG/DL — SIGNIFICANT CHANGE UP (ref 8.4–10.5)
CHLORIDE SERPL-SCNC: 94 MMOL/L — LOW (ref 98–107)
CO2 SERPL-SCNC: 26 MMOL/L — SIGNIFICANT CHANGE UP (ref 22–31)
CREAT SERPL-MCNC: 0.98 MG/DL — SIGNIFICANT CHANGE UP (ref 0.5–1.3)
GLUCOSE SERPL-MCNC: 108 MG/DL — HIGH (ref 70–99)
HCT VFR BLD CALC: 32 % — LOW (ref 39–50)
HGB BLD-MCNC: 10.4 G/DL — LOW (ref 13–17)
MCHC RBC-ENTMCNC: 29.2 PG — SIGNIFICANT CHANGE UP (ref 27–34)
MCHC RBC-ENTMCNC: 32.5 GM/DL — SIGNIFICANT CHANGE UP (ref 32–36)
MCV RBC AUTO: 89.9 FL — SIGNIFICANT CHANGE UP (ref 80–100)
NRBC # BLD: 0 /100 WBCS — SIGNIFICANT CHANGE UP
NRBC # FLD: 0 K/UL — SIGNIFICANT CHANGE UP
PLATELET # BLD AUTO: 708 K/UL — HIGH (ref 150–400)
POTASSIUM SERPL-MCNC: 4.9 MMOL/L — SIGNIFICANT CHANGE UP (ref 3.5–5.3)
POTASSIUM SERPL-SCNC: 4.9 MMOL/L — SIGNIFICANT CHANGE UP (ref 3.5–5.3)
RBC # BLD: 3.56 M/UL — LOW (ref 4.2–5.8)
RBC # FLD: 12.3 % — SIGNIFICANT CHANGE UP (ref 10.3–14.5)
SODIUM SERPL-SCNC: 133 MMOL/L — LOW (ref 135–145)
WBC # BLD: 12.53 K/UL — HIGH (ref 3.8–10.5)
WBC # FLD AUTO: 12.53 K/UL — HIGH (ref 3.8–10.5)

## 2021-08-14 PROCEDURE — 71045 X-RAY EXAM CHEST 1 VIEW: CPT | Mod: 26,76

## 2021-08-14 RX ORDER — MULTIVIT WITH MIN/MFOLATE/K2 340-15/3 G
0.5 POWDER (GRAM) ORAL ONCE
Refills: 0 | Status: COMPLETED | OUTPATIENT
Start: 2021-08-14 | End: 2021-08-14

## 2021-08-14 RX ADMIN — Medication 0.5 BOTTLE: at 10:12

## 2021-08-14 RX ADMIN — HYDROMORPHONE HYDROCHLORIDE 0.5 MILLIGRAM(S): 2 INJECTION INTRAMUSCULAR; INTRAVENOUS; SUBCUTANEOUS at 17:34

## 2021-08-14 RX ADMIN — SODIUM CHLORIDE 4 MILLILITER(S): 9 INJECTION INTRAMUSCULAR; INTRAVENOUS; SUBCUTANEOUS at 04:38

## 2021-08-14 RX ADMIN — HYDROMORPHONE HYDROCHLORIDE 0.5 MILLIGRAM(S): 2 INJECTION INTRAMUSCULAR; INTRAVENOUS; SUBCUTANEOUS at 16:34

## 2021-08-14 RX ADMIN — NYSTATIN CREAM 1 APPLICATION(S): 100000 CREAM TOPICAL at 05:47

## 2021-08-14 RX ADMIN — DORNASE ALFA 2.5 MILLIGRAM(S): 1 SOLUTION RESPIRATORY (INHALATION) at 22:41

## 2021-08-14 RX ADMIN — Medication 650 MILLIGRAM(S): at 00:47

## 2021-08-14 RX ADMIN — SODIUM CHLORIDE 4 MILLILITER(S): 9 INJECTION INTRAMUSCULAR; INTRAVENOUS; SUBCUTANEOUS at 09:18

## 2021-08-14 RX ADMIN — HYDROMORPHONE HYDROCHLORIDE 0.5 MILLIGRAM(S): 2 INJECTION INTRAMUSCULAR; INTRAVENOUS; SUBCUTANEOUS at 05:47

## 2021-08-14 RX ADMIN — HYDROMORPHONE HYDROCHLORIDE 0.5 MILLIGRAM(S): 2 INJECTION INTRAMUSCULAR; INTRAVENOUS; SUBCUTANEOUS at 09:53

## 2021-08-14 RX ADMIN — HEPARIN SODIUM 5000 UNIT(S): 5000 INJECTION INTRAVENOUS; SUBCUTANEOUS at 05:48

## 2021-08-14 RX ADMIN — HEPARIN SODIUM 5000 UNIT(S): 5000 INJECTION INTRAVENOUS; SUBCUTANEOUS at 12:41

## 2021-08-14 RX ADMIN — Medication 650 MILLIGRAM(S): at 06:03

## 2021-08-14 RX ADMIN — SENNA PLUS 10 MILLILITER(S): 8.6 TABLET ORAL at 12:41

## 2021-08-14 RX ADMIN — ALBUTEROL 2.5 MILLIGRAM(S): 90 AEROSOL, METERED ORAL at 04:38

## 2021-08-14 RX ADMIN — GABAPENTIN 200 MILLIGRAM(S): 400 CAPSULE ORAL at 21:31

## 2021-08-14 RX ADMIN — HYDROMORPHONE HYDROCHLORIDE 0.5 MILLIGRAM(S): 2 INJECTION INTRAMUSCULAR; INTRAVENOUS; SUBCUTANEOUS at 10:00

## 2021-08-14 RX ADMIN — ALBUTEROL 2.5 MILLIGRAM(S): 90 AEROSOL, METERED ORAL at 09:18

## 2021-08-14 RX ADMIN — DORNASE ALFA 2.5 MILLIGRAM(S): 1 SOLUTION RESPIRATORY (INHALATION) at 09:41

## 2021-08-14 RX ADMIN — Medication 650 MILLIGRAM(S): at 06:33

## 2021-08-14 RX ADMIN — HYDROMORPHONE HYDROCHLORIDE 0.5 MILLIGRAM(S): 2 INJECTION INTRAMUSCULAR; INTRAVENOUS; SUBCUTANEOUS at 20:42

## 2021-08-14 RX ADMIN — GABAPENTIN 200 MILLIGRAM(S): 400 CAPSULE ORAL at 06:03

## 2021-08-14 RX ADMIN — ALBUTEROL 2.5 MILLIGRAM(S): 90 AEROSOL, METERED ORAL at 15:59

## 2021-08-14 RX ADMIN — ALBUTEROL 2.5 MILLIGRAM(S): 90 AEROSOL, METERED ORAL at 22:40

## 2021-08-14 RX ADMIN — Medication 650 MILLIGRAM(S): at 18:35

## 2021-08-14 RX ADMIN — SODIUM CHLORIDE 4 MILLILITER(S): 9 INJECTION INTRAMUSCULAR; INTRAVENOUS; SUBCUTANEOUS at 22:40

## 2021-08-14 RX ADMIN — NYSTATIN CREAM 1 APPLICATION(S): 100000 CREAM TOPICAL at 21:31

## 2021-08-14 RX ADMIN — PANTOPRAZOLE SODIUM 40 MILLIGRAM(S): 20 TABLET, DELAYED RELEASE ORAL at 12:41

## 2021-08-14 RX ADMIN — SODIUM CHLORIDE 4 MILLILITER(S): 9 INJECTION INTRAMUSCULAR; INTRAVENOUS; SUBCUTANEOUS at 16:00

## 2021-08-14 RX ADMIN — HEPARIN SODIUM 5000 UNIT(S): 5000 INJECTION INTRAVENOUS; SUBCUTANEOUS at 21:31

## 2021-08-14 RX ADMIN — GABAPENTIN 200 MILLIGRAM(S): 400 CAPSULE ORAL at 16:06

## 2021-08-14 RX ADMIN — HYDROMORPHONE HYDROCHLORIDE 0.5 MILLIGRAM(S): 2 INJECTION INTRAMUSCULAR; INTRAVENOUS; SUBCUTANEOUS at 21:12

## 2021-08-14 RX ADMIN — Medication 650 MILLIGRAM(S): at 00:17

## 2021-08-14 RX ADMIN — HYDROMORPHONE HYDROCHLORIDE 0.5 MILLIGRAM(S): 2 INJECTION INTRAMUSCULAR; INTRAVENOUS; SUBCUTANEOUS at 06:17

## 2021-08-14 RX ADMIN — Medication 1 ENEMA: at 19:59

## 2021-08-14 RX ADMIN — NYSTATIN CREAM 1 APPLICATION(S): 100000 CREAM TOPICAL at 12:42

## 2021-08-14 RX ADMIN — Medication 650 MILLIGRAM(S): at 12:41

## 2021-08-14 NOTE — PROGRESS NOTE ADULT - SUBJECTIVE AND OBJECTIVE BOX
Subjective: pt admits constipation, + flatus CXR with barium in bowels  OOB ambulating ad sandra  pain controlled  pt feels full at night so tube feeds stopped early    Vital Signs:  Vital Signs Last 24 Hrs  T(C): 36.7 (08-14-21 @ 09:34), Max: 37.6 (08-13-21 @ 20:00)  T(F): 98 (08-14-21 @ 09:34), Max: 99.6 (08-13-21 @ 20:00)  HR: 78 (08-14-21 @ 09:41) (75 - 100)  BP: 115/70 (08-14-21 @ 09:34) (106/73 - 126/71)  RR: 18 (08-14-21 @ 09:34) (12 - 19)  SpO2: 98% (08-14-21 @ 09:34) (93% - 100%) on (O2)    Telemetry/Alarms:  General: WN/WD NAD  Neurology: Awake, nonfocal, LOERA x 4  Eyes: Scleras clear, PERRLA/ EOMI, Gross vision intact  ENT:Gross hearing intact, grossly patent pharynx, no stridor  Neck: Neck supple, trachea midline, No JVD,   Respiratory: CTA B/L, No wheezing, rales, rhonchi  CV: RRR, S1S2, no murmurs, rubs or gallops  Abdominal: Soft, NT, ND +BS,   Extremities: No edema, + peripheral pulses  Skin: No Rashes, Hematoma, Ecchymosis  Lymphatic: No Neck, axilla, groin LAD  Psych: Oriented x 3, normal affect  Incisions: c,d,i  Tubes: esau drained 55 on waterseal no air leak, will likely removed today  Relevant labs, radiology and Medications reviewed                        10.4   12.53 )-----------( 708      ( 14 Aug 2021 06:25 )             32.0     08-14    133<L>  |  94<L>  |  18  ----------------------------<  108<H>  4.9   |  26  |  0.98    Ca    9.8      14 Aug 2021 06:25      PT/INR - ( 13 Aug 2021 06:46 )   PT: 14.4 sec;   INR: 1.27 ratio         PTT - ( 13 Aug 2021 06:46 )  PTT:28.7 sec  MEDICATIONS  (STANDING):  acetaminophen    Suspension .. 650 milliGRAM(s) Oral every 6 hours  ALBUTerol    0.083% 2.5 milliGRAM(s) Nebulizer every 6 hours  dornase duarte Solution 2.5 milliGRAM(s) Inhalation two times a day  gabapentin   Solution 200 milliGRAM(s) Oral three times a day  heparin   Injectable 5000 Unit(s) SubCutaneous every 8 hours  nystatin Powder 1 Application(s) Topical three times a day  pantoprazole  Injectable 40 milliGRAM(s) IV Push daily  senna Syrup 10 milliLiter(s) Oral daily  sodium chloride 3%  Inhalation 4 milliLiter(s) Inhalation every 6 hours    MEDICATIONS  (PRN):  bisacodyl Suppository 10 milliGRAM(s) Rectal daily PRN Constipation  HYDROmorphone  Injectable 0.5 milliGRAM(s) IV Push every 4 hours PRN Severe Pain (7 - 10)  naloxone Injectable 0.1 milliGRAM(s) IV Push every 3 minutes PRN For ANY of the following changes in patient status:  A. RR LESS THAN 10 breaths per minute, B. Oxygen saturation LESS THAN 90%, C. Sedation score of 6  ondansetron Injectable 4 milliGRAM(s) IV Push every 6 hours PRN Nausea  oxyCODONE    Solution 5 milliGRAM(s) Oral every 4 hours PRN Mild Pain (1 - 3)  oxyCODONE    Solution 10 milliGRAM(s) Oral every 4 hours PRN Moderate Pain (4 - 6)    Pertinent Physical Exam  I&O's Summary    13 Aug 2021 07:01  -  14 Aug 2021 07:00  --------------------------------------------------------  IN: 425 mL / OUT: 1315 mL / NET: -890 mL        Assessment  50y Male  w/ PAST MEDICAL & SURGICAL HISTORY:  Achalasia  Shoulder injury left    achalasia (10 Aug 2021 11:19)  .  51 y/o male with Achalasia s/p 3-Hole Esophagogastrectomy, now with increasing leukocytosis.  FB, EGD yesterday.    PLAN  Neuro: Pain management  Pulm: Encourage coughing, deep breathing and use of incentive spirometry. Wean off supplemental oxygen as able. Daily CXR.   Cardio: Monitor telemetry/alarms  GI: Tolerating diet. Continue stool softeners. magnesium citrate today  Renal: monitor urine output, supplement electrolytes as needed  Vasc: Heparin SC/SCDs for DVT prophylaxis  Heme: Stable H/H. .   ID: Off antibiotics. Stable.  Therapy: OOB/ambulate  Tubes: Monitor Chest tube output, likley remove today  Disposition: Aim to D/C to home on Monday  Discussed with Cardiothoracic Team at AM rounds.      Subjective: pt admits constipation, + flatus CXR with barium in bowels  OOB ambulating ad sandra  pain controlled  pt feels full at night so tube feeds stopped early    Vital Signs:  Vital Signs Last 24 Hrs  T(C): 36.7 (08-14-21 @ 09:34), Max: 37.6 (08-13-21 @ 20:00)  T(F): 98 (08-14-21 @ 09:34), Max: 99.6 (08-13-21 @ 20:00)  HR: 78 (08-14-21 @ 09:41) (75 - 100)  BP: 115/70 (08-14-21 @ 09:34) (106/73 - 126/71)  RR: 18 (08-14-21 @ 09:34) (12 - 19)  SpO2: 98% (08-14-21 @ 09:34) (93% - 100%) on (O2)    Telemetry/Alarms:  General: WN/WD NAD  Neurology: Awake, nonfocal, LOERA x 4  Eyes: Scleras clear, PERRLA/ EOMI, Gross vision intact  ENT:Gross hearing intact, grossly patent pharynx, no stridor  Neck: Neck supple, trachea midline, No JVD,   Respiratory: CTA B/L, No wheezing, rales, rhonchi  CV: RRR, S1S2, no murmurs, rubs or gallops  Abdominal: Soft, NT, ND +BS,   Extremities: No edema, + peripheral pulses  Skin: No Rashes, Hematoma, Ecchymosis  Lymphatic: No Neck, axilla, groin LAD  Psych: Oriented x 3, normal affect  Incisions: c,d,i  Tubes: esau drained 55 on waterseal no air leak, will likely removed today  Relevant labs, radiology and Medications reviewed                        10.4   12.53 )-----------( 708      ( 14 Aug 2021 06:25 )             32.0     08-14    133<L>  |  94<L>  |  18  ----------------------------<  108<H>  4.9   |  26  |  0.98    Ca    9.8      14 Aug 2021 06:25      PT/INR - ( 13 Aug 2021 06:46 )   PT: 14.4 sec;   INR: 1.27 ratio         PTT - ( 13 Aug 2021 06:46 )  PTT:28.7 sec  MEDICATIONS  (STANDING):  acetaminophen    Suspension .. 650 milliGRAM(s) Oral every 6 hours  ALBUTerol    0.083% 2.5 milliGRAM(s) Nebulizer every 6 hours  dornase duarte Solution 2.5 milliGRAM(s) Inhalation two times a day  gabapentin   Solution 200 milliGRAM(s) Oral three times a day  heparin   Injectable 5000 Unit(s) SubCutaneous every 8 hours  nystatin Powder 1 Application(s) Topical three times a day  pantoprazole  Injectable 40 milliGRAM(s) IV Push daily  senna Syrup 10 milliLiter(s) Oral daily  sodium chloride 3%  Inhalation 4 milliLiter(s) Inhalation every 6 hours    MEDICATIONS  (PRN):  bisacodyl Suppository 10 milliGRAM(s) Rectal daily PRN Constipation  HYDROmorphone  Injectable 0.5 milliGRAM(s) IV Push every 4 hours PRN Severe Pain (7 - 10)  naloxone Injectable 0.1 milliGRAM(s) IV Push every 3 minutes PRN For ANY of the following changes in patient status:  A. RR LESS THAN 10 breaths per minute, B. Oxygen saturation LESS THAN 90%, C. Sedation score of 6  ondansetron Injectable 4 milliGRAM(s) IV Push every 6 hours PRN Nausea  oxyCODONE    Solution 5 milliGRAM(s) Oral every 4 hours PRN Mild Pain (1 - 3)  oxyCODONE    Solution 10 milliGRAM(s) Oral every 4 hours PRN Moderate Pain (4 - 6)    Pertinent Physical Exam  I&O's Summary    13 Aug 2021 07:01  -  14 Aug 2021 07:00  --------------------------------------------------------  IN: 425 mL / OUT: 1315 mL / NET: -890 mL        Assessment  50y Male  w/ PAST MEDICAL & SURGICAL HISTORY:  Achalasia  Shoulder injury left    achalasia (10 Aug 2021 11:19)  .  51 y/o male with Achalasia s/p 3-Hole Esophagogastrectomy,  leukocytosis improving  FB, EGD yesterday.    PLAN  Neuro: Pain management  Pulm: Encourage coughing, deep breathing and use of incentive spirometry. Wean off supplemental oxygen as able. Daily CXR.   Cardio: Monitor telemetry/alarms  GI: Tolerating diet. Continue stool softeners. magnesium citrate today  Renal: monitor urine output, supplement electrolytes as needed  Vasc: Heparin SC/SCDs for DVT prophylaxis  Heme: Stable H/H. .   ID: Off antibiotics. Stable.  Therapy: OOB/ambulate  Tubes: Monitor Chest tube output, likley remove today  Disposition: Aim to D/C to home on Monday  Discussed with Cardiothoracic Team at AM rounds.

## 2021-08-14 NOTE — PROVIDER CONTACT NOTE (OTHER) - ACTION/TREATMENT ORDERED:
Provider will further assess. Temperature retaken with same result. Ice packs given. Will continue to monitor and check orders.

## 2021-08-14 NOTE — PROVIDER CONTACT NOTE (OTHER) - ASSESSMENT
A&Ox4.  and temp 102.3. Patient resting comfortably in bed with no complaints of chest pain, shortness of breath, dizziness, or diaphoresis.

## 2021-08-15 LAB
ANION GAP SERPL CALC-SCNC: 13 MMOL/L — SIGNIFICANT CHANGE UP (ref 7–14)
APPEARANCE UR: CLEAR — SIGNIFICANT CHANGE UP
BACTERIA # UR AUTO: NEGATIVE — SIGNIFICANT CHANGE UP
BILIRUB UR-MCNC: NEGATIVE — SIGNIFICANT CHANGE UP
BUN SERPL-MCNC: 22 MG/DL — SIGNIFICANT CHANGE UP (ref 7–23)
CALCIUM SERPL-MCNC: 10.2 MG/DL — SIGNIFICANT CHANGE UP (ref 8.4–10.5)
CHLORIDE SERPL-SCNC: 93 MMOL/L — LOW (ref 98–107)
CO2 SERPL-SCNC: 25 MMOL/L — SIGNIFICANT CHANGE UP (ref 22–31)
COLOR SPEC: YELLOW — SIGNIFICANT CHANGE UP
CREAT SERPL-MCNC: 1.2 MG/DL — SIGNIFICANT CHANGE UP (ref 0.5–1.3)
DIFF PNL FLD: NEGATIVE — SIGNIFICANT CHANGE UP
EPI CELLS # UR: 0 /HPF — SIGNIFICANT CHANGE UP (ref 0–5)
GLUCOSE SERPL-MCNC: 100 MG/DL — HIGH (ref 70–99)
GLUCOSE UR QL: NEGATIVE — SIGNIFICANT CHANGE UP
HCT VFR BLD CALC: 35.2 % — LOW (ref 39–50)
HGB BLD-MCNC: 11.2 G/DL — LOW (ref 13–17)
HYALINE CASTS # UR AUTO: 0 /LPF — SIGNIFICANT CHANGE UP (ref 0–7)
KETONES UR-MCNC: NEGATIVE — SIGNIFICANT CHANGE UP
LEUKOCYTE ESTERASE UR-ACNC: NEGATIVE — SIGNIFICANT CHANGE UP
MCHC RBC-ENTMCNC: 29.2 PG — SIGNIFICANT CHANGE UP (ref 27–34)
MCHC RBC-ENTMCNC: 31.8 GM/DL — LOW (ref 32–36)
MCV RBC AUTO: 91.7 FL — SIGNIFICANT CHANGE UP (ref 80–100)
NITRITE UR-MCNC: NEGATIVE — SIGNIFICANT CHANGE UP
NRBC # BLD: 0 /100 WBCS — SIGNIFICANT CHANGE UP
NRBC # FLD: 0 K/UL — SIGNIFICANT CHANGE UP
PH UR: 6.5 — SIGNIFICANT CHANGE UP (ref 5–8)
PLATELET # BLD AUTO: 754 K/UL — HIGH (ref 150–400)
POTASSIUM SERPL-MCNC: 5 MMOL/L — SIGNIFICANT CHANGE UP (ref 3.5–5.3)
POTASSIUM SERPL-SCNC: 5 MMOL/L — SIGNIFICANT CHANGE UP (ref 3.5–5.3)
PROT UR-MCNC: ABNORMAL
RBC # BLD: 3.84 M/UL — LOW (ref 4.2–5.8)
RBC # FLD: 12.7 % — SIGNIFICANT CHANGE UP (ref 10.3–14.5)
RBC CASTS # UR COMP ASSIST: 1 /HPF — SIGNIFICANT CHANGE UP (ref 0–4)
SODIUM SERPL-SCNC: 131 MMOL/L — LOW (ref 135–145)
SP GR SPEC: 1.02 — SIGNIFICANT CHANGE UP (ref 1.01–1.02)
UROBILINOGEN FLD QL: SIGNIFICANT CHANGE UP
WBC # BLD: 14.05 K/UL — HIGH (ref 3.8–10.5)
WBC # FLD AUTO: 14.05 K/UL — HIGH (ref 3.8–10.5)
WBC UR QL: 1 /HPF — SIGNIFICANT CHANGE UP (ref 0–5)

## 2021-08-15 PROCEDURE — 71045 X-RAY EXAM CHEST 1 VIEW: CPT | Mod: 26

## 2021-08-15 PROCEDURE — 74220 X-RAY XM ESOPHAGUS 1CNTRST: CPT | Mod: 26

## 2021-08-15 PROCEDURE — 99223 1ST HOSP IP/OBS HIGH 75: CPT

## 2021-08-15 RX ORDER — SODIUM CHLORIDE 9 MG/ML
500 INJECTION INTRAMUSCULAR; INTRAVENOUS; SUBCUTANEOUS ONCE
Refills: 0 | Status: COMPLETED | OUTPATIENT
Start: 2021-08-15 | End: 2021-08-15

## 2021-08-15 RX ORDER — PIPERACILLIN AND TAZOBACTAM 4; .5 G/20ML; G/20ML
3.38 INJECTION, POWDER, LYOPHILIZED, FOR SOLUTION INTRAVENOUS EVERY 8 HOURS
Refills: 0 | Status: DISCONTINUED | OUTPATIENT
Start: 2021-08-15 | End: 2021-08-18

## 2021-08-15 RX ORDER — PIPERACILLIN AND TAZOBACTAM 4; .5 G/20ML; G/20ML
3.38 INJECTION, POWDER, LYOPHILIZED, FOR SOLUTION INTRAVENOUS ONCE
Refills: 0 | Status: COMPLETED | OUTPATIENT
Start: 2021-08-15 | End: 2021-08-15

## 2021-08-15 RX ADMIN — HEPARIN SODIUM 5000 UNIT(S): 5000 INJECTION INTRAVENOUS; SUBCUTANEOUS at 05:57

## 2021-08-15 RX ADMIN — SODIUM CHLORIDE 4 MILLILITER(S): 9 INJECTION INTRAMUSCULAR; INTRAVENOUS; SUBCUTANEOUS at 22:01

## 2021-08-15 RX ADMIN — HEPARIN SODIUM 5000 UNIT(S): 5000 INJECTION INTRAVENOUS; SUBCUTANEOUS at 13:39

## 2021-08-15 RX ADMIN — NYSTATIN CREAM 1 APPLICATION(S): 100000 CREAM TOPICAL at 13:09

## 2021-08-15 RX ADMIN — PANTOPRAZOLE SODIUM 40 MILLIGRAM(S): 20 TABLET, DELAYED RELEASE ORAL at 11:25

## 2021-08-15 RX ADMIN — Medication 650 MILLIGRAM(S): at 05:58

## 2021-08-15 RX ADMIN — HEPARIN SODIUM 5000 UNIT(S): 5000 INJECTION INTRAVENOUS; SUBCUTANEOUS at 23:58

## 2021-08-15 RX ADMIN — SENNA PLUS 10 MILLILITER(S): 8.6 TABLET ORAL at 11:26

## 2021-08-15 RX ADMIN — GABAPENTIN 200 MILLIGRAM(S): 400 CAPSULE ORAL at 13:39

## 2021-08-15 RX ADMIN — PIPERACILLIN AND TAZOBACTAM 25 GRAM(S): 4; .5 INJECTION, POWDER, LYOPHILIZED, FOR SOLUTION INTRAVENOUS at 23:55

## 2021-08-15 RX ADMIN — Medication 650 MILLIGRAM(S): at 00:54

## 2021-08-15 RX ADMIN — Medication 650 MILLIGRAM(S): at 11:25

## 2021-08-15 RX ADMIN — GABAPENTIN 200 MILLIGRAM(S): 400 CAPSULE ORAL at 05:57

## 2021-08-15 RX ADMIN — GABAPENTIN 200 MILLIGRAM(S): 400 CAPSULE ORAL at 23:57

## 2021-08-15 RX ADMIN — Medication 650 MILLIGRAM(S): at 17:32

## 2021-08-15 RX ADMIN — NYSTATIN CREAM 1 APPLICATION(S): 100000 CREAM TOPICAL at 05:57

## 2021-08-15 RX ADMIN — PIPERACILLIN AND TAZOBACTAM 200 GRAM(S): 4; .5 INJECTION, POWDER, LYOPHILIZED, FOR SOLUTION INTRAVENOUS at 17:32

## 2021-08-15 RX ADMIN — SODIUM CHLORIDE 166.67 MILLILITER(S): 9 INJECTION INTRAMUSCULAR; INTRAVENOUS; SUBCUTANEOUS at 15:30

## 2021-08-15 RX ADMIN — Medication 650 MILLIGRAM(S): at 23:58

## 2021-08-15 RX ADMIN — DORNASE ALFA 2.5 MILLIGRAM(S): 1 SOLUTION RESPIRATORY (INHALATION) at 22:00

## 2021-08-15 RX ADMIN — ALBUTEROL 2.5 MILLIGRAM(S): 90 AEROSOL, METERED ORAL at 22:00

## 2021-08-15 NOTE — CONSULT NOTE ADULT - SUBJECTIVE AND OBJECTIVE BOX
Patient is a 50y old  Male who presents with a chief complaint of s/p esophagectomy for achalasia (10 Aug 2021 11:19)    HPI:  50 year old male with  PMH of esophageal dysfunction  presents to Presurgical testing with diagnosis of achalasia of cardia scheduled for upper endoscopy robotic assisted right video assisted laparoscopic neck incision, three holes esophagectomy , J tube placement  Patient with reports that he always feels that his food is stuck in his chest and not going down. States that he had several endoscopic procedures but no relief  (2021 10:56)     prior hospital charts reviewed [  ]  primary team notes reviewed [  ]  other consultant notes reviewed [  ]    PAST MEDICAL & SURGICAL HISTORY:  Achalasia    Shoulder injury  left    History of esophagogastroduodenoscopy (EGD)      Allergies  No Known Allergies    ANTIMICROBIALS (past 90 days)  MEDICATIONS  (STANDING):      ANTIMICROBIALS:      OTHER MEDS: MEDICATIONS  (STANDING):  acetaminophen    Suspension .. 650 every 6 hours  ALBUTerol    0.083% 2.5 every 6 hours  bisacodyl Suppository 10 daily PRN  dornase duarte Solution 2.5 two times a day  gabapentin   Solution 200 three times a day  heparin   Injectable 5000 every 8 hours  HYDROmorphone  Injectable 0.5 every 4 hours PRN  ondansetron Injectable 4 every 6 hours PRN  oxyCODONE    Solution 10 every 4 hours PRN  oxyCODONE    Solution 5 every 4 hours PRN  pantoprazole  Injectable 40 daily  senna Syrup 10 daily  sodium chloride 3%  Inhalation 4 every 6 hours    SOCIAL HISTORY:       FAMILY HISTORY:  No pertinent family history in first degree relatives      REVIEW OF SYSTEMS  [  ] ROS unobtainable because:    [  ] All other systems negative except as noted below:	    Constitutional:  [ ] fever [ ] chills  [ ] weight loss  [ ] weakness  Skin:  [ ] rash [ ] phlebitis	  Eyes: [ ] icterus [ ] pain  [ ] discharge	  ENMT: [ ] sore throat  [ ] thrush [ ] ulcers [ ] exudates  Respiratory: [ ] dyspnea [ ] hemoptysis [ ] cough [ ] sputum	  Cardiovascular:  [ ] chest pain [ ] palpitations [ ] edema	  Gastrointestinal:  [ ] nausea [ ] vomiting [ ] diarrhea [ ] constipation [ ] pain	  Genitourinary:  [ ] dysuria [ ] frequency [ ] hematuria [ ] discharge [ ] flank pain  [ ] incontinence  Musculoskeletal:  [ ] myalgias [ ] arthralgias [ ] arthritis  [ ] back pain  Neurological:  [ ] headache [ ] seizures  [ ] confusion/altered mental status  Psychiatric:  [ ] anxiety [ ] depression	  Hematology/Lymphatics:  [ ] lymphadenopathy  Endocrine:  [ ] adrenal [ ] thyroid  Allergic/Immunologic:	 [ ] transplant [ ] seasonal    Vital Signs Last 24 Hrs  T(F): 100.6 (08-15-21 @ 12:00), Max: 102.3 (21 @ 21:28)  Vital Signs Last 24 Hrs  HR: 103 (08-15-21 @ 10:42) (85 - 118)  BP: 100/57 (08-15-21 @ 10:42) (100/57 - 119/73)  RR: 18 (08-15-21 @ 10:42)  SpO2: 98% (08-15-21 @ 10:42) (96% - 98%)  Wt(kg): --    EXAM:  Constitutional: Not in acute distress  Eyes: pupils bilaterally reactive to light. No icterus.  Oral cavity: Clear, no lesions  Neck: No neck vein distension noted  RS: Chest clear to auscultation bilaterally. No wheeze/rhonchi/crepitations.  CVS: S1, S2 heard. Regular rate and rhythm. No murmurs/rubs/gallops.  Abdomen: Soft. No guarding/rigidity/tenderness.  : No acute abnormalities  Extremities: Warm. No pedal edema  Skin: No lesions noted  Vascular: No evidence of phlebitis  Neuro: Alert, oriented to time/place/person                          11.2   14.05 )-----------( 754      ( 15 Aug 2021 05:43 )             35.2     08-15    131<L>  |  93<L>  |  22  ----------------------------<  100<H>  5.0   |  25  |  1.20    Ca    10.2      15 Aug 2021 05:43      Urinalysis Basic - ( 15 Aug 2021 12:34 )    Color: Yellow / Appearance: Clear / S.023 / pH: x  Gluc: x / Ketone: Negative  / Bili: Negative / Urobili: <2 mg/dL   Blood: x / Protein: Trace / Nitrite: Negative   Leuk Esterase: Negative / RBC: 1 /HPF / WBC 1 /HPF   Sq Epi: x / Non Sq Epi: 0 /HPF / Bacteria: Negative    MICROBIOLOGY:  Culture - Acid Fast - Bronchial w/Smear (collected 13 Aug 2021 16:54)  Source: .Bronchial BRONCHIAL LAVAGE    Culture - Bronchial (collected 13 Aug 2021 16:54)  Source: .Bronchial BRONCHIAL LAVAGE  Gram Stain (13 Aug 2021 22:30):    Moderate polymorphonuclear leukocytes per low power field    No squamous epithelial cells per low power field    Few Gram Negative Rods per oil power field  Preliminary Report (14 Aug 2021 19:19):    Moderate Haemophilus influenzae "Susceptibilities not performed"    Normal Respiratory Marli present      RADIOLOGY:  imaging below personally reviewed    < from: Xray Chest 1 View- PORTABLE-Urgent (Xray Chest 1 View- PORTABLE-Urgent .) (21 @ 14:50) >  Findings/  Impression: Heart unremarkable. Lungs are clear. Right base postoperative changes. No pneumothorax after chest tube removal. No subcutaneous emphysema.    < end of copied text >  < from: CT Abdomen and Pelvis w/ Oral Cont and w/ IV Cont (21 @ 18:56) >  IMPRESSION:  Left lower lobe groundglass and patchy consolidative opacities, concerning for pneumonia.  Supraumbilical fluid collection likely representing hematoma.  Status post esophagectomy with gastric pull-through.    < end of copied text >    OTHER TESTS:   Patient is a 50y old  Male who presents with a chief complaint of s/p esophagectomy for achalasia (10 Aug 2021 11:19)    HPI:  50 year old male with  PMH of esophageal dysfunction presented to Presurgical testing 8/3 with diagnosis of achalasia of cardia scheduled for upper endoscopy robotic assisted right video assisted laparoscopic neck incision, three holes esophagectomy, J tube placement  Patient with reports that he always feels that his food is stuck in his chest and not going down. States that he had several endoscopic procedures but no relief.     S/p /p 3-Hole Esophagogastrectomy with exlap, R VATS, j tube placement and bronch on 8/3.   Had repeat bronch on  with EGD.   Developed fever 8/15 to 102 with fluctuating leukocytosis.  Endorses some "warmth around J tube."  Reports a productive sputum that has changed from yellow to clear.     ID consulted for fever.    prior hospital charts reviewed [ x ]  primary team notes reviewed [ x ]  other consultant notes reviewed [ x ]    PAST MEDICAL & SURGICAL HISTORY:  Achalasia    Shoulder injury  left    History of esophagogastroduodenoscopy (EGD)      Allergies  No Known Allergies    ANTIMICROBIALS (past 90 days)  MEDICATIONS  (STANDING):      ANTIMICROBIALS:      OTHER MEDS: MEDICATIONS  (STANDING):  acetaminophen    Suspension .. 650 every 6 hours  ALBUTerol    0.083% 2.5 every 6 hours  bisacodyl Suppository 10 daily PRN  dornase duarte Solution 2.5 two times a day  gabapentin   Solution 200 three times a day  heparin   Injectable 5000 every 8 hours  HYDROmorphone  Injectable 0.5 every 4 hours PRN  ondansetron Injectable 4 every 6 hours PRN  oxyCODONE    Solution 10 every 4 hours PRN  oxyCODONE    Solution 5 every 4 hours PRN  pantoprazole  Injectable 40 daily  senna Syrup 10 daily  sodium chloride 3%  Inhalation 4 every 6 hours    SOCIAL HISTORY:   Worked in constructions, intermittent  smoker, no drug or etoh use.    FAMILY HISTORY:  No pertinent family history in first degree relatives      REVIEW OF SYSTEMS  [  ] ROS unobtainable because:    [x  ] All other systems negative except as noted below:	    Constitutional:  [ x] fever [ ] chills  [ ] weight loss  [ ] weakness  Skin:  [ ] rash [ ] phlebitis	  Eyes: [ ] icterus [ ] pain  [ ] discharge	  ENMT: [ ] sore throat  [ ] thrush [ ] ulcers [ ] exudates  Respiratory: [ ] dyspnea [ ] hemoptysis [ x] cough [ ] sputum	  Cardiovascular:  [ ] chest pain [ ] palpitations [ ] edema	  Gastrointestinal:  [ ] nausea [ ] vomiting [ ] diarrhea [ ] constipation [ ] pain	  Genitourinary:  [ ] dysuria [ ] frequency [ ] hematuria [ ] discharge [ ] flank pain  [ ] incontinence  Musculoskeletal:  [ ] myalgias [ ] arthralgias [ ] arthritis  [ ] back pain  Neurological:  [ ] headache [ ] seizures  [ ] confusion/altered mental status  Psychiatric:  [ ] anxiety [ ] depression	  Hematology/Lymphatics:  [ ] lymphadenopathy  Endocrine:  [ ] adrenal [ ] thyroid  Allergic/Immunologic:	 [ ] transplant [ ] seasonal    Vital Signs Last 24 Hrs  T(F): 100.6 (08-15-21 @ 12:00), Max: 102.3 (21 @ 21:28)  Vital Signs Last 24 Hrs  HR: 103 (08-15-21 @ 10:42) (85 - 118)  BP: 100/57 (08-15-21 @ 10:42) (100/57 - 119/73)  RR: 18 (08-15-21 @ 10:42)  SpO2: 98% (08-15-21 @ 10:42) (96% - 98%)  Wt(kg): --    EXAM:  Constitutional: Not in acute distress  Eyes: pupils bilaterally reactive to light. No icterus.  Oral cavity: Clear, no lesions  Neck: No neck vein distension noted  RS: Chest clear to auscultation bilaterally. No wheeze/rhonchi/crepitations.  CVS: S1, S2 heard. Regular rate and rhythm. No murmurs/rubs/gallops.  Abdomen: Soft. No guarding/rigidity/tenderness. J tube c/d/i  : No acute abnormalities  Extremities: Warm. No pedal edema  Skin: R side chest tube site with stiches in place; ex lap vertical incision clean; neck incision site clean  Vascular: No evidence of phlebitis  Neuro: Alert, oriented to time/place/person                          11.2   14.05 )-----------( 754      ( 15 Aug 2021 05:43 )             35.2     08-15    131<L>  |  93<L>  |  22  ----------------------------<  100<H>  5.0   |  25  |  1.20    Ca    10.2      15 Aug 2021 05:43      Urinalysis Basic - ( 15 Aug 2021 12:34 )    Color: Yellow / Appearance: Clear / S.023 / pH: x  Gluc: x / Ketone: Negative  / Bili: Negative / Urobili: <2 mg/dL   Blood: x / Protein: Trace / Nitrite: Negative   Leuk Esterase: Negative / RBC: 1 /HPF / WBC 1 /HPF   Sq Epi: x / Non Sq Epi: 0 /HPF / Bacteria: Negative    MICROBIOLOGY:  Culture - Acid Fast - Bronchial w/Smear (collected 13 Aug 2021 16:54)  Source: .Bronchial BRONCHIAL LAVAGE    Culture - Bronchial (collected 13 Aug 2021 16:54)  Source: .Bronchial BRONCHIAL LAVAGE  Gram Stain (13 Aug 2021 22:30):    Moderate polymorphonuclear leukocytes per low power field    No squamous epithelial cells per low power field    Few Gram Negative Rods per oil power field  Preliminary Report (14 Aug 2021 19:19):    Moderate Haemophilus influenzae "Susceptibilities not performed"    Normal Respiratory Marli present      RADIOLOGY:  imaging below personally reviewed    < from: Xray Chest 1 View- PORTABLE-Urgent (Xray Chest 1 View- PORTABLE-Urgent .) (21 @ 14:50) >  Findings/  Impression: Heart unremarkable. Lungs are clear. Right base postoperative changes. No pneumothorax after chest tube removal. No subcutaneous emphysema.    < end of copied text >  < from: CT Abdomen and Pelvis w/ Oral Cont and w/ IV Cont (21 @ 18:56) >  IMPRESSION:  Left lower lobe groundglass and patchy consolidative opacities, concerning for pneumonia.  Supraumbilical fluid collection likely representing hematoma.  Status post esophagectomy with gastric pull-through.

## 2021-08-15 NOTE — CONSULT NOTE ADULT - ASSESSMENT
A/P: 50yMale s/p Flex bronch, R robotic-assisted VATS mobilization of esophagus, robotic-assisted laparoscopic converted to open [2/2 dense adhesions of celiac trunk / lesser curve of stomach to liver], mobilization of gastric conduit, pyloroplasty. ENT consulted for vocal cord evaluation given recent hoarseness after procedure.    - On scope exam, R vocal cord appears paralyzed/paretic, L vocal cord difficult to visualize given NGt placement, but arytenoids appear mobile. Glottic gap  - Pt is intubatable from above with open airway, no significant obstruction  - ENT will rescope when NGt is removed for proper evaluation  - Will have outpatient follow up with Dr. Culp regardless out future exams given R vocal cord dysfunction
50 year old male with  PMH of esophageal dysfunction and achalasia, here for surgery.  S/p /p 3-Hole Esophagogastrectomy with exlap, R VATS, j tube placement and bronch on 8/3.   Had repeat bronch on 8/13 with EGD.   Developed fever 8/15 to 102 with fluctuating leukocytosis.    #Fever, leukocytosis - concerning for aspiration PNA in setting of achalasia and recent procedure. Overall well-appearing and recovering from surgery.  CT showed a L base opacity on 8/12 concerning for pna and 8/14 CXR clears lungs with R bse post op changes.    Rec:  - start zosyn for asp pna, likely 7 days  - f/u bl clx  - trend fever curve and leukocytosis    Dale Brice MD  Fellow, Infectious Diseases, PGY-4  Pager: [phone]  Before 9am or after 5pm/Weekends: Call [phone]

## 2021-08-15 NOTE — PROGRESS NOTE ADULT - SUBJECTIVE AND OBJECTIVE BOX
Subjective: 51 y/o male seen at bedside in [facility], no acute events overnight. Patient continues to c/o abdominal fullness with tube feeds.     Vital Signs:  Vital Signs Last 24 Hrs  T(C): 37.2 (08-15-21 @ 04:56), Max: 39.1 (08-14-21 @ 21:28)  T(F): 98.9 (08-15-21 @ 04:56), Max: 102.3 (08-14-21 @ 21:28)  HR: 96 (08-15-21 @ 04:56) (78 - 118)  BP: 114/69 (08-15-21 @ 04:56) (107/69 - 119/73)  RR: 18 (08-15-21 @ 04:56) (18 - 18)  SpO2: 98% (08-15-21 @ 04:56) (96% - 98%) on (O2)    Pertinent Physical Exam:  Telemetry/Alarms: NSR  General: WN/WD NAD  Respiratory: CTA B/L,   CV: RRR,  Abdominal: Soft, NT, ND +BS, Staples in place  Extremities: No edema, + peripheral pulses  Skin: No Rashes, Hematoma, Ecchymosis  Lymphatic: No Neck, axilla, groin LAD  Psych: Oriented x 3, normal affect  Incisions: cdi  Tubes: J tube site clean        I&O's Summary    14 Aug 2021 07:01  -  15 Aug 2021 07:00  --------------------------------------------------------  IN: 150 mL / OUT: 925 mL / NET: -775 mL        Relevant labs, radiology and Medications reviewed      CXR: RLL pNA ?  pending official read                     11.2   14.05 )-----------( 754      ( 15 Aug 2021 05:43 )             35.2     08-15    131<L>  |  93<L>  |  22  ----------------------------<  100<H>  5.0   |  25  |  1.20    Ca    10.2      15 Aug 2021 05:43        MEDICATIONS  (STANDING):  acetaminophen    Suspension .. 650 milliGRAM(s) Oral every 6 hours  ALBUTerol    0.083% 2.5 milliGRAM(s) Nebulizer every 6 hours  dornase duarte Solution 2.5 milliGRAM(s) Inhalation two times a day  gabapentin   Solution 200 milliGRAM(s) Oral three times a day  heparin   Injectable 5000 Unit(s) SubCutaneous every 8 hours  nystatin Powder 1 Application(s) Topical three times a day  pantoprazole  Injectable 40 milliGRAM(s) IV Push daily  senna Syrup 10 milliLiter(s) Oral daily  sodium chloride 3%  Inhalation 4 milliLiter(s) Inhalation every 6 hours    MEDICATIONS  (PRN):  bisacodyl Suppository 10 milliGRAM(s) Rectal daily PRN Constipation  HYDROmorphone  Injectable 0.5 milliGRAM(s) IV Push every 4 hours PRN Severe Pain (7 - 10)  naloxone Injectable 0.1 milliGRAM(s) IV Push every 3 minutes PRN For ANY of the following changes in patient status:  A. RR LESS THAN 10 breaths per minute, B. Oxygen saturation LESS THAN 90%, C. Sedation score of 6  ondansetron Injectable 4 milliGRAM(s) IV Push every 6 hours PRN Nausea  oxyCODONE    Solution 5 milliGRAM(s) Oral every 4 hours PRN Mild Pain (1 - 3)  oxyCODONE    Solution 10 milliGRAM(s) Oral every 4 hours PRN Moderate Pain (4 - 6)      Assessment    51 y/o male with Achalasia s/p 3-Hole Esophagogastrectomy, now with increasing leukocytosis.  8/15 Febrile overnight to 102, now resolves. Wounds CDI, staples in place. WBC increasing 14. Continues to c/o fullness with tube feedings.    PLAN  Neuro: Pain management  Pulm: Encourage coughing, deep breathing and use of incentive spirometry.  Daily CXR.   Cardio: Monitor telemetry/alarms  GI: TFs. Fullness with TFs. Has held off on them for two nights. Staples in place  Renal: monitor urine output, supplement electrolytes as needed  Vasc: Heparin SC/SCDs for DVT prophylaxis  Heme: Stable H/H.  ID: Uptrending wbc with fever. Send UA, blood cx, sputum cx.   Therapy: OOB/ambulate  Tubes: Monitor Jtube  Disposition: Aim to D/C to home once medicailly and surgically cleared   Discussed with Cardiothoracic Team at AM rounds.

## 2021-08-15 NOTE — CONSULT NOTE ADULT - ATTENDING COMMENTS
50 year old male with  PMH of esophageal dysfunction  presents to Presurgical testing with diagnosis of achalasia of cardia scheduled for upper endoscopy robotic assisted right video assisted laparoscopic neck incision    Fever, H influenzae on sputum culture, minimal symptoms  Concern for possible aspiration    Not able to formally evaluate patient as he was at barium swallow (although ID fellow did see and examine patient).    For now, would treat with Zosyn 3.375g q 8  Check sputum culture  Follow up barium swallow  Aspiration precautions  Monitor for further fevers    Please call if specific questions or if I can assist, will follow    Trip Mosher MD  Pager [phone]  From 5pm-9am, and on weekends call [phone]    I was physically present for the key portions of the evaluation and management service provided. I saw and examined the patient. I agree with the above history, physical, and plan except for any discrepancies which I have documented in “Attending Statements.” Please refer to “Attending Statements” for final plan. 50 year old male with  PMH of esophageal dysfunction  presents to Presurgical testing with diagnosis of achalasia of cardia scheduled for upper endoscopy robotic assisted right video assisted laparoscopic neck incision  Fever, leukocytosis, H influenzae on sputum culture, minimal symptoms  Barium swallow with contained leak?  Concern that fever was due aspiration/pneumonia  Overall, Fever, aspiration, leukocytosis, bacterial pneumonia  - Zosyn 3.375g q 8  - F/U BCXs  - Trend WBC and fevers to normal  - Check sputum culture if producing  - Aspiration precautions  - Monitor for further fevers    Trip Mosher MD  Pager [phone]  From 5pm-9am, and on weekends call [phone]    I was physically present for the key portions of the evaluation and management service provided. I saw and examined the patient. I agree with the above history, physical, and plan except for any discrepancies which I have documented in “Attending Statements.” Please refer to “Attending Statements” for final plan.

## 2021-08-16 LAB
ALBUMIN SERPL ELPH-MCNC: 3.4 G/DL — SIGNIFICANT CHANGE UP (ref 3.3–5)
ALP SERPL-CCNC: 58 U/L — SIGNIFICANT CHANGE UP (ref 40–120)
ALT FLD-CCNC: 19 U/L — SIGNIFICANT CHANGE UP (ref 4–41)
ANION GAP SERPL CALC-SCNC: 16 MMOL/L — HIGH (ref 7–14)
AST SERPL-CCNC: 15 U/L — SIGNIFICANT CHANGE UP (ref 4–40)
BASOPHILS # BLD AUTO: 0.06 K/UL — SIGNIFICANT CHANGE UP (ref 0–0.2)
BASOPHILS NFR BLD AUTO: 0.6 % — SIGNIFICANT CHANGE UP (ref 0–2)
BILIRUB SERPL-MCNC: 0.8 MG/DL — SIGNIFICANT CHANGE UP (ref 0.2–1.2)
BUN SERPL-MCNC: 24 MG/DL — HIGH (ref 7–23)
CALCIUM SERPL-MCNC: 10 MG/DL — SIGNIFICANT CHANGE UP (ref 8.4–10.5)
CHLORIDE SERPL-SCNC: 96 MMOL/L — LOW (ref 98–107)
CO2 SERPL-SCNC: 22 MMOL/L — SIGNIFICANT CHANGE UP (ref 22–31)
CREAT SERPL-MCNC: 1.38 MG/DL — HIGH (ref 0.5–1.3)
EOSINOPHIL # BLD AUTO: 0 K/UL — SIGNIFICANT CHANGE UP (ref 0–0.5)
EOSINOPHIL NFR BLD AUTO: 0 % — SIGNIFICANT CHANGE UP (ref 0–6)
GLUCOSE SERPL-MCNC: 95 MG/DL — SIGNIFICANT CHANGE UP (ref 70–99)
HCT VFR BLD CALC: 30.6 % — LOW (ref 39–50)
HGB BLD-MCNC: 9.5 G/DL — LOW (ref 13–17)
IANC: 6.99 K/UL — SIGNIFICANT CHANGE UP (ref 1.5–8.5)
IMM GRANULOCYTES NFR BLD AUTO: 0.7 % — SIGNIFICANT CHANGE UP (ref 0–1.5)
LYMPHOCYTES # BLD AUTO: 0.96 K/UL — LOW (ref 1–3.3)
LYMPHOCYTES # BLD AUTO: 10 % — LOW (ref 13–44)
MCHC RBC-ENTMCNC: 28.7 PG — SIGNIFICANT CHANGE UP (ref 27–34)
MCHC RBC-ENTMCNC: 31 GM/DL — LOW (ref 32–36)
MCV RBC AUTO: 92.4 FL — SIGNIFICANT CHANGE UP (ref 80–100)
MONOCYTES # BLD AUTO: 1.56 K/UL — HIGH (ref 0–0.9)
MONOCYTES NFR BLD AUTO: 16.2 % — HIGH (ref 2–14)
NEUTROPHILS # BLD AUTO: 6.99 K/UL — SIGNIFICANT CHANGE UP (ref 1.8–7.4)
NEUTROPHILS NFR BLD AUTO: 72.5 % — SIGNIFICANT CHANGE UP (ref 43–77)
NRBC # BLD: 0 /100 WBCS — SIGNIFICANT CHANGE UP
NRBC # FLD: 0 K/UL — SIGNIFICANT CHANGE UP
PLATELET # BLD AUTO: 763 K/UL — HIGH (ref 150–400)
POTASSIUM SERPL-MCNC: 5 MMOL/L — SIGNIFICANT CHANGE UP (ref 3.5–5.3)
POTASSIUM SERPL-SCNC: 5 MMOL/L — SIGNIFICANT CHANGE UP (ref 3.5–5.3)
PROT SERPL-MCNC: 7.7 G/DL — SIGNIFICANT CHANGE UP (ref 6–8.3)
RBC # BLD: 3.31 M/UL — LOW (ref 4.2–5.8)
RBC # FLD: 12.4 % — SIGNIFICANT CHANGE UP (ref 10.3–14.5)
SODIUM SERPL-SCNC: 134 MMOL/L — LOW (ref 135–145)
WBC # BLD: 9.64 K/UL — SIGNIFICANT CHANGE UP (ref 3.8–10.5)
WBC # FLD AUTO: 9.64 K/UL — SIGNIFICANT CHANGE UP (ref 3.8–10.5)

## 2021-08-16 PROCEDURE — 71045 X-RAY EXAM CHEST 1 VIEW: CPT | Mod: 26

## 2021-08-16 RX ORDER — SODIUM CHLORIDE 9 MG/ML
1000 INJECTION INTRAMUSCULAR; INTRAVENOUS; SUBCUTANEOUS
Refills: 0 | Status: DISCONTINUED | OUTPATIENT
Start: 2021-08-16 | End: 2021-08-17

## 2021-08-16 RX ORDER — ENOXAPARIN SODIUM 100 MG/ML
40 INJECTION SUBCUTANEOUS DAILY
Refills: 0 | Status: DISCONTINUED | OUTPATIENT
Start: 2021-08-16 | End: 2021-08-18

## 2021-08-16 RX ORDER — SODIUM CHLORIDE 9 MG/ML
250 INJECTION INTRAMUSCULAR; INTRAVENOUS; SUBCUTANEOUS ONCE
Refills: 0 | Status: COMPLETED | OUTPATIENT
Start: 2021-08-16 | End: 2021-08-16

## 2021-08-16 RX ADMIN — GABAPENTIN 200 MILLIGRAM(S): 400 CAPSULE ORAL at 15:01

## 2021-08-16 RX ADMIN — SODIUM CHLORIDE 75 MILLILITER(S): 9 INJECTION INTRAMUSCULAR; INTRAVENOUS; SUBCUTANEOUS at 09:43

## 2021-08-16 RX ADMIN — Medication 650 MILLIGRAM(S): at 11:24

## 2021-08-16 RX ADMIN — Medication 650 MILLIGRAM(S): at 18:35

## 2021-08-16 RX ADMIN — PIPERACILLIN AND TAZOBACTAM 25 GRAM(S): 4; .5 INJECTION, POWDER, LYOPHILIZED, FOR SOLUTION INTRAVENOUS at 22:53

## 2021-08-16 RX ADMIN — HEPARIN SODIUM 5000 UNIT(S): 5000 INJECTION INTRAVENOUS; SUBCUTANEOUS at 06:01

## 2021-08-16 RX ADMIN — SENNA PLUS 10 MILLILITER(S): 8.6 TABLET ORAL at 11:25

## 2021-08-16 RX ADMIN — SODIUM CHLORIDE 500 MILLILITER(S): 9 INJECTION INTRAMUSCULAR; INTRAVENOUS; SUBCUTANEOUS at 22:53

## 2021-08-16 RX ADMIN — PIPERACILLIN AND TAZOBACTAM 25 GRAM(S): 4; .5 INJECTION, POWDER, LYOPHILIZED, FOR SOLUTION INTRAVENOUS at 15:03

## 2021-08-16 RX ADMIN — ALBUTEROL 2.5 MILLIGRAM(S): 90 AEROSOL, METERED ORAL at 16:23

## 2021-08-16 RX ADMIN — GABAPENTIN 200 MILLIGRAM(S): 400 CAPSULE ORAL at 06:00

## 2021-08-16 RX ADMIN — NYSTATIN CREAM 1 APPLICATION(S): 100000 CREAM TOPICAL at 22:54

## 2021-08-16 RX ADMIN — PIPERACILLIN AND TAZOBACTAM 25 GRAM(S): 4; .5 INJECTION, POWDER, LYOPHILIZED, FOR SOLUTION INTRAVENOUS at 06:00

## 2021-08-16 RX ADMIN — SODIUM CHLORIDE 4 MILLILITER(S): 9 INJECTION INTRAMUSCULAR; INTRAVENOUS; SUBCUTANEOUS at 16:24

## 2021-08-16 RX ADMIN — PANTOPRAZOLE SODIUM 40 MILLIGRAM(S): 20 TABLET, DELAYED RELEASE ORAL at 11:25

## 2021-08-16 RX ADMIN — NYSTATIN CREAM 1 APPLICATION(S): 100000 CREAM TOPICAL at 06:01

## 2021-08-16 RX ADMIN — NYSTATIN CREAM 1 APPLICATION(S): 100000 CREAM TOPICAL at 00:00

## 2021-08-16 RX ADMIN — GABAPENTIN 200 MILLIGRAM(S): 400 CAPSULE ORAL at 22:53

## 2021-08-16 RX ADMIN — Medication 650 MILLIGRAM(S): at 06:00

## 2021-08-16 RX ADMIN — NYSTATIN CREAM 1 APPLICATION(S): 100000 CREAM TOPICAL at 15:22

## 2021-08-16 RX ADMIN — ENOXAPARIN SODIUM 40 MILLIGRAM(S): 100 INJECTION SUBCUTANEOUS at 15:01

## 2021-08-16 NOTE — PROGRESS NOTE ADULT - SUBJECTIVE AND OBJECTIVE BOX
Subjective:  "I feel good but what's with these fevers" Pt concerned w temps for past 24hrs. States he's willing to resume tube feeds again, feeling less "Full". Denies CP or SOB. OOB w assist. No n/v. Had large BM last night. No chills. Last fever 5pm last night. Teaching reinforced for Lovenox at home and tube feeds/water flushes.     Vital Signs:  Vital Signs Last 24 Hrs  T(C): 37.1 (08-16-21 @ 08:17), Max: 39.4 (08-15-21 @ 17:44)  T(F): 98.8 (08-16-21 @ 08:17), Max: 102.9 (08-15-21 @ 17:44)  HR: 89 (08-16-21 @ 08:17) (89 - 121)  BP: 107/63 (08-16-21 @ 08:17) (107/63 - 119/60)  RR: 18 (08-16-21 @ 08:17) (18 - 18)  SpO2: 100% (08-16-21 @ 08:17) (94% - 100%) on (O2)    Telemetry/Alarms: Tele SR/ST  General: WN/WD NAD  Neurology: Awake, nonfocal, LOERA x 4  Eyes: Scleras clear, PERRLA/ EOMI, Gross vision intact  ENT:Gross hearing intact, grossly patent pharynx, no stridor  Neck: Neck supple, trachea midline, No JVD,   Respiratory: Slight dec BS bilat LL  CV: RRR, S1S2, no murmurs, rubs or gallops  Abdominal: Soft, NT, ND +BS, + BM yesterday. NPO. Pt had refused TF for fullness, now willing to resume.   Extremities: No edema, + peripheral pulses. No calf tnderness/swelling.   Skin: No Rashes, Hematoma, Ecchymosis  Lymphatic: No Neck, axilla, groin LAD  Psych: Oriented x 3, normal affect  Incisions: Neck, Rt chest and abd incision all c/d/i, every other staple in place. No redness or drainage.   Tubes: J tube site c/d/i.   Relevant labs, radiology and Medications reviewed           CXR stable, no obvious effusion or pna.     PROCEDURE DATE:  Aug 15 2021         INTERPRETATION:  CLINICAL INFORMATION: Recurrent fevers, history of esophagectomy and gastric pull-through    TECHNIQUE: An esophagram was performed under fluoroscopy utilizing barium as the contrast agent and multiple spot fluoroscopic images were taken in the AP, oblique and lateral projections.    COMPARISON: CT chest 8/12/2021    FLUORO TIME: 3 minutes    FINDINGS:  Preliminary  radiograph of the chest shows surgical skin staples overlying left neck. Drainage catheter or feeding tube projects in the upper abdomen.    Patient required chin tuck technique to swallow barium. Otherwise, no difficulties.    Contrast passes freely through the intrathoracic gastric pouch and into the subdiaphragmatic stomach. There is no evidence of leakage of contrast from the gastric pouch lumen or anastomosis site into the surrounding tissues. On lateral views, there appears to be a small outpouching filled with contrast along the posterior aspect of the gastric pouch at the level of the sternal notch. Small contained leak cannot be excluded. This is best visualized on the following series/images:    19/1-25, 20/1-2    Contrast freely enters small bowel without leakage of contrast in the subdiaphragmatic abdomen.      IMPRESSION:    Small diverticulum versus contained leak along the posterior aspect of proximal intrathoracic gastric pouch.    The above possibilities were conveyed by telephone to nurse practitioner Pinky at 8:15 AM on the morning of the dictation.      --- End of Report ---             9.5    9.64  )-----------( 763      ( 16 Aug 2021 07:12 )             30.6     08-16    134<L>  |  96<L>  |  24<H>  ----------------------------<  95  5.0   |  22  |  1.38<H>    Ca    10.0      16 Aug 2021 07:12    TPro  7.7  /  Alb  3.4  /  TBili  0.8  /  DBili  x   /  AST  15  /  ALT  19  /  AlkPhos  58  08-16      MEDICATIONS  (STANDING):  acetaminophen    Suspension .. 650 milliGRAM(s) Oral every 6 hours  ALBUTerol    0.083% 2.5 milliGRAM(s) Nebulizer every 6 hours  dornase duarte Solution 2.5 milliGRAM(s) Inhalation two times a day  enoxaparin Injectable 40 milliGRAM(s) SubCutaneous daily  gabapentin   Solution 200 milliGRAM(s) Oral three times a day  nystatin Powder 1 Application(s) Topical three times a day  pantoprazole  Injectable 40 milliGRAM(s) IV Push daily  piperacillin/tazobactam IVPB.. 3.375 Gram(s) IV Intermittent every 8 hours  senna Syrup 10 milliLiter(s) Oral daily  sodium chloride 0.9%. 1000 milliLiter(s) (75 mL/Hr) IV Continuous <Continuous>  sodium chloride 3%  Inhalation 4 milliLiter(s) Inhalation every 6 hours    MEDICATIONS  (PRN):  bisacodyl Suppository 10 milliGRAM(s) Rectal daily PRN Constipation  HYDROmorphone  Injectable 0.5 milliGRAM(s) IV Push every 4 hours PRN Severe Pain (7 - 10)  naloxone Injectable 0.1 milliGRAM(s) IV Push every 3 minutes PRN For ANY of the following changes in patient status:  A. RR LESS THAN 10 breaths per minute, B. Oxygen saturation LESS THAN 90%, C. Sedation score of 6  ondansetron Injectable 4 milliGRAM(s) IV Push every 6 hours PRN Nausea  oxyCODONE    Solution 5 milliGRAM(s) Oral every 4 hours PRN Mild Pain (1 - 3)  oxyCODONE    Solution 10 milliGRAM(s) Oral every 4 hours PRN Moderate Pain (4 - 6)    Pertinent Physical Exam  I&O's Summary    15 Aug 2021 07:01  -  16 Aug 2021 07:00  --------------------------------------------------------  IN: 140 mL / OUT: 650 mL / NET: -510 mL    16 Aug 2021 07:01  -  16 Aug 2021 13:30  --------------------------------------------------------  IN: 180 mL / OUT: 250 mL / NET: -70 mL      Blood cultures NGTD, UA neg.     Assessment  50y Male  w/ PAST MEDICAL & SURGICAL HISTORY:  Achalasia    Shoulder injury  left    History of esophagogastroduodenoscopy (EGD)    admitted with complaints of Patient is a 50y old  Male who presents with a chief complaint of achalasia surgery (15 Aug 2021 14:25)  49 y/o male with Achalasia s/p 3-Hole Esophagogastrectomy, now with increasing leukocytosis.  8/15 Febrile overnight to 102, now resolves. Wounds CDI, staples in place. WBC increasing 14. Continues to c/o fullness with tube feedings.  8/16- Cultures NGTD. No fever since 5pm last night. Pt restarted on TF. Esophagram from 8/15 could not r/o a small contained leak. kept NPO w TF.     PLAN  Neuro: Pain management. Cont current regiment. All meds elixir via J tube.   Pulm: Encourage coughing, deep breathing and use of incentive spirometry. Wean off supplemental oxygen as able. Daily CXR.   Cardio: Monitor telemetry/alarms  GI: Resumed on TF at 20cc/hr. Will titrate up slowly to goal 24hr rate of 36cc/hr w low volume free water flushes to avoid fullness. If pt tolerates, will eventually convert to nocturnal regiment. Cont strict NPO for possible contained leak. .Cont J tube teaching.   Renal: monitor urine output, supplement electrolytes as needed. some Angie likely due to dehyration, will give IVF NS @75cc/hr.   Vasc: Heparin SC/SCDs for DVT prophylaxis. Change to daily Lovenox with teaching for home.   Heme: Stable H/H. .   ID: Leukocytosis resolved. FU cultures. Trend fevers. Cont Zosyn for now.   Therapy: OOB/ambulate  Disposition: Aim to D/C to home once fevers resolved, can tolerate TF.   Discussed with Cardiothoracic Team at AM rounds.

## 2021-08-16 NOTE — PROGRESS NOTE ADULT - NUTRITIONAL ASSESSMENT
This patient has been assessed with a concern for Malnutrition and has been determined to have a diagnosis/diagnoses of Severe protein-calorie malnutrition.    This patient is being managed with:   Diet NPO with Tube Feed-  Tube Feeding Modality: Jejunostomy  TwoCal HN (TWOCALHNRTH)  Total Volume for 24 Hours (mL): 864  Continuous  Starting Tube Feed Rate {mL per Hour}: 20  Increase Tube Feed Rate by (mL): 10     Every 10 hours  Until Goal Tube Feed Rate (mL per Hour): 36  Tube Feed Duration (in Hours): 24  Tube Feed Start Time: 09:00  Entered: Aug 16 2021  9:01AM    
This patient has been assessed with a concern for Malnutrition and has been determined to have a diagnosis/diagnoses of Severe protein-calorie malnutrition.    This patient is being managed with:   Diet NPO with Tube Feed-  Tube Feeding Modality: Jejunostomy  TwoCal HN (TWOCALHNRTH)  Total Volume for 24 Hours (mL): 864  Continuous  Starting Tube Feed Rate {mL per Hour}: 30  Increase Tube Feed Rate by (mL): 6     Every 12 hours  Until Goal Tube Feed Rate (mL per Hour): 36  Tube Feed Duration (in Hours): 24  Tube Feed Start Time: 09:00  Entered: Aug  8 2021  9:28AM    
This patient has been assessed with a concern for Malnutrition and has been determined to have a diagnosis/diagnoses of Severe protein-calorie malnutrition.    This patient is being managed with:   Diet NPO with Tube Feed-  Tube Feeding Modality: Jejunostomy  TwoCal HN (TWOCALHNRTH)  Total Volume for 24 Hours (mL): 880  Continuous  Starting Tube Feed Rate {mL per Hour}: 55     Every hour  Until Goal Tube Feed Rate (mL per Hour): 55  Tube Feed Duration (in Hours): 16  Tube Feed Start Time: 18:00  Entered: Aug 10 2021  2:02PM    
This patient has been assessed with a concern for Malnutrition and has been determined to have a diagnosis/diagnoses of Severe protein-calorie malnutrition.    This patient is being managed with:   Diet NPO with Tube Feed-  Tube Feeding Modality: Jejunostomy  TwoCal HN (TWOCALHNRTH)  Total Volume for 24 Hours (mL): 880  Continuous  Starting Tube Feed Rate {mL per Hour}: 55     Every hour  Until Goal Tube Feed Rate (mL per Hour): 55  Tube Feed Duration (in Hours): 16  Tube Feed Start Time: 18:00  Entered: Aug 10 2021  2:02PM    
This patient has been assessed with a concern for Malnutrition and has been determined to have a diagnosis/diagnoses of Severe protein-calorie malnutrition.    This patient is being managed with:   Diet NPO-  Tube Feeding Modality: Jejunostomy  TwoCal HN (TWOCALHNRTH)  Total Volume for 24 Hours (mL): 240  Continuous  Starting Tube Feed Rate {mL per Hour}: 10  Until Goal Tube Feed Rate (mL per Hour): 10  Tube Feed Duration (in Hours): 24  Tube Feed Start Time: 09:00  Entered: Aug  6 2021  9:10AM    
This patient has been assessed with a concern for Malnutrition and has been determined to have a diagnosis/diagnoses of Severe protein-calorie malnutrition.    This patient is being managed with:   Diet NPO with Tube Feed-  Tube Feeding Modality: Jejunostomy  TwoCal HN (TWOCALHNRTH)  Total Volume for 24 Hours (mL): 864  Continuous  Starting Tube Feed Rate {mL per Hour}: 20  Increase Tube Feed Rate by (mL): 10     Every 24 hours  Until Goal Tube Feed Rate (mL per Hour): 36  Tube Feed Duration (in Hours): 24  Tube Feed Start Time: 10:00  Entered: Aug  7 2021 10:06AM    
This patient has been assessed with a concern for Malnutrition and has been determined to have a diagnosis/diagnoses of Severe protein-calorie malnutrition.    This patient is being managed with:   Diet NPO with Tube Feed-  Tube Feeding Modality: Jejunostomy  TwoCal HN (TWOCALHNRTH)  Total Volume for 24 Hours (mL): 880  Continuous  Starting Tube Feed Rate {mL per Hour}: 55     Every hour  Until Goal Tube Feed Rate (mL per Hour): 55  Tube Feed Duration (in Hours): 16  Tube Feed Start Time: 18:00  Entered: Aug 10 2021  2:02PM    
This patient has been assessed with a concern for Malnutrition and has been determined to have a diagnosis/diagnoses of Severe protein-calorie malnutrition.    This patient is being managed with:   Diet NPO with Tube Feed-  Tube Feeding Modality: Jejunostomy  TwoCal HN (TWOCALHNRTH)  Total Volume for 24 Hours (mL): 880  Continuous  Starting Tube Feed Rate {mL per Hour}: 55     Every hour  Until Goal Tube Feed Rate (mL per Hour): 55  Tube Feed Duration (in Hours): 16  Tube Feed Start Time: 18:00  Entered: Aug 10 2021  2:02PM    
This patient has been assessed with a concern for Malnutrition and has been determined to have a diagnosis/diagnoses of Severe protein-calorie malnutrition.    This patient is being managed with:   Diet NPO-  Entered: Aug  3 2021  8:19PM

## 2021-08-17 LAB
ANION GAP SERPL CALC-SCNC: 10 MMOL/L — SIGNIFICANT CHANGE UP (ref 7–14)
BUN SERPL-MCNC: 21 MG/DL — SIGNIFICANT CHANGE UP (ref 7–23)
CALCIUM SERPL-MCNC: 9.5 MG/DL — SIGNIFICANT CHANGE UP (ref 8.4–10.5)
CHLORIDE SERPL-SCNC: 101 MMOL/L — SIGNIFICANT CHANGE UP (ref 98–107)
CO2 SERPL-SCNC: 26 MMOL/L — SIGNIFICANT CHANGE UP (ref 22–31)
CREAT SERPL-MCNC: 1.21 MG/DL — SIGNIFICANT CHANGE UP (ref 0.5–1.3)
GLUCOSE SERPL-MCNC: 93 MG/DL — SIGNIFICANT CHANGE UP (ref 70–99)
HCT VFR BLD CALC: 30.9 % — LOW (ref 39–50)
HGB BLD-MCNC: 9.7 G/DL — LOW (ref 13–17)
MCHC RBC-ENTMCNC: 29.4 PG — SIGNIFICANT CHANGE UP (ref 27–34)
MCHC RBC-ENTMCNC: 31.4 GM/DL — LOW (ref 32–36)
MCV RBC AUTO: 93.6 FL — SIGNIFICANT CHANGE UP (ref 80–100)
NRBC # BLD: 0 /100 WBCS — SIGNIFICANT CHANGE UP
NRBC # FLD: 0 K/UL — SIGNIFICANT CHANGE UP
PLATELET # BLD AUTO: 734 K/UL — HIGH (ref 150–400)
POTASSIUM SERPL-MCNC: 4.8 MMOL/L — SIGNIFICANT CHANGE UP (ref 3.5–5.3)
POTASSIUM SERPL-SCNC: 4.8 MMOL/L — SIGNIFICANT CHANGE UP (ref 3.5–5.3)
RBC # BLD: 3.3 M/UL — LOW (ref 4.2–5.8)
RBC # FLD: 12.3 % — SIGNIFICANT CHANGE UP (ref 10.3–14.5)
SODIUM SERPL-SCNC: 137 MMOL/L — SIGNIFICANT CHANGE UP (ref 135–145)
WBC # BLD: 6.06 K/UL — SIGNIFICANT CHANGE UP (ref 3.8–10.5)
WBC # FLD AUTO: 6.06 K/UL — SIGNIFICANT CHANGE UP (ref 3.8–10.5)

## 2021-08-17 PROCEDURE — 71045 X-RAY EXAM CHEST 1 VIEW: CPT | Mod: 26

## 2021-08-17 PROCEDURE — 99232 SBSQ HOSP IP/OBS MODERATE 35: CPT

## 2021-08-17 RX ORDER — SODIUM CHLORIDE 9 MG/ML
250 INJECTION INTRAMUSCULAR; INTRAVENOUS; SUBCUTANEOUS ONCE
Refills: 0 | Status: COMPLETED | OUTPATIENT
Start: 2021-08-17 | End: 2021-08-17

## 2021-08-17 RX ADMIN — Medication 650 MILLIGRAM(S): at 00:36

## 2021-08-17 RX ADMIN — SODIUM CHLORIDE 500 MILLILITER(S): 9 INJECTION INTRAMUSCULAR; INTRAVENOUS; SUBCUTANEOUS at 21:46

## 2021-08-17 RX ADMIN — SENNA PLUS 10 MILLILITER(S): 8.6 TABLET ORAL at 12:46

## 2021-08-17 RX ADMIN — NYSTATIN CREAM 1 APPLICATION(S): 100000 CREAM TOPICAL at 06:12

## 2021-08-17 RX ADMIN — GABAPENTIN 200 MILLIGRAM(S): 400 CAPSULE ORAL at 15:29

## 2021-08-17 RX ADMIN — Medication 650 MILLIGRAM(S): at 06:11

## 2021-08-17 RX ADMIN — SODIUM CHLORIDE 75 MILLILITER(S): 9 INJECTION INTRAMUSCULAR; INTRAVENOUS; SUBCUTANEOUS at 00:36

## 2021-08-17 RX ADMIN — NYSTATIN CREAM 1 APPLICATION(S): 100000 CREAM TOPICAL at 15:28

## 2021-08-17 RX ADMIN — Medication 650 MILLIGRAM(S): at 12:46

## 2021-08-17 RX ADMIN — ALBUTEROL 2.5 MILLIGRAM(S): 90 AEROSOL, METERED ORAL at 22:21

## 2021-08-17 RX ADMIN — NYSTATIN CREAM 1 APPLICATION(S): 100000 CREAM TOPICAL at 22:48

## 2021-08-17 RX ADMIN — PIPERACILLIN AND TAZOBACTAM 25 GRAM(S): 4; .5 INJECTION, POWDER, LYOPHILIZED, FOR SOLUTION INTRAVENOUS at 06:13

## 2021-08-17 RX ADMIN — ENOXAPARIN SODIUM 40 MILLIGRAM(S): 100 INJECTION SUBCUTANEOUS at 12:47

## 2021-08-17 RX ADMIN — PIPERACILLIN AND TAZOBACTAM 25 GRAM(S): 4; .5 INJECTION, POWDER, LYOPHILIZED, FOR SOLUTION INTRAVENOUS at 15:28

## 2021-08-17 RX ADMIN — GABAPENTIN 200 MILLIGRAM(S): 400 CAPSULE ORAL at 06:25

## 2021-08-17 RX ADMIN — PANTOPRAZOLE SODIUM 40 MILLIGRAM(S): 20 TABLET, DELAYED RELEASE ORAL at 12:47

## 2021-08-17 RX ADMIN — Medication 650 MILLIGRAM(S): at 17:45

## 2021-08-17 RX ADMIN — GABAPENTIN 200 MILLIGRAM(S): 400 CAPSULE ORAL at 22:47

## 2021-08-17 RX ADMIN — PIPERACILLIN AND TAZOBACTAM 25 GRAM(S): 4; .5 INJECTION, POWDER, LYOPHILIZED, FOR SOLUTION INTRAVENOUS at 22:48

## 2021-08-17 NOTE — PROGRESS NOTE ADULT - ASSESSMENT
50 year old male with  PMH of esophageal dysfunction  presents to Presurgical testing with diagnosis of achalasia of cardia scheduled for upper endoscopy robotic assisted right video assisted laparoscopic neck incision  Fever, leukocytosis, H influenzae on sputum culture, minimal symptoms  Barium swallow with contained leak?  Concern that fever was due aspiration/pneumonia  Now improving on abx course  Overall, Fever, aspiration, leukocytosis, bacterial pneumonia  - Zosyn 3.375g q 8  - Plan for 7 day course treatment, when DC planning, would send out on Augmentin via PEG (continue Zosyn while inpatient)  - F/U BCXs  - Trend WBC and fevers to normal; monitor Cr  - Check sputum culture if producing  - Aspiration precautions  - Monitor for further fevers    My colleagues will be covering this patient on 8/18/21, I will return 8/19/21.  Please call [phone] or on call fellow with any questions or change in status.     Trip Mosher MD  Pager [phone]  From 5pm-9am, and on weekends call [phone]
51 y/o male with Achalasia s/p 3-Hole Esophagogastrectomy, now with increasing leukocytosis.        PLAN  1. CT of Chest/Abdomen/Pelvis with PO & IV contrast today  2. Possible EGD & Bronchoscopy tomorrow  3. Encourage coughing, deep breathing & use of incentive spirometry. Daily CXR. Chest PT  3. Tolerating nocturnal TF. Continue bowel regimen  4. Continue DVT PPx w/ Heparin SC & Venodynes  5. OOB/ambulate  6. Monitor Chest tube output  7. Pain management PRN      Discussed with Cardiothoracic Team at AM rounds.    Felipe Weems PA-C, MPAS  Thoracic Surgery   Spectra [phone]

## 2021-08-17 NOTE — PROGRESS NOTE ADULT - SUBJECTIVE AND OBJECTIVE BOX
Subjective:   pt afebrile, no complaints  ambulating in room  pt strict NPO  tolerating tube feeds at lower rate  practicing lovenox and tube feed self administration    Vital Signs:  Vital Signs Last 24 Hrs  T(C): 37.1 (08-17-21 @ 05:53), Max: 37.4 (08-16-21 @ 20:41)  T(F): 98.7 (08-17-21 @ 05:53), Max: 99.4 (08-16-21 @ 20:41)  HR: 93 (08-17-21 @ 05:53) (78 - 102)  BP: 106/57 (08-17-21 @ 05:53) (96/54 - 111/68)  RR: 18 (08-17-21 @ 05:53) (17 - 18)  SpO2: 98% (08-17-21 @ 05:53) (93% - 100%) on (O2)    Telemetry/Alarms:  General: WN/WD NAD  Neurology: Awake, nonfocal, LOERA x 4  Eyes: Scleras clear, PERRLA/ EOMI, Gross vision intact  ENT:Gross hearing intact, grossly patent pharynx, no stridor  Neck: Neck supple, trachea midline, No JVD,   Respiratory: CTA B/L, No wheezing, rales, rhonchi  CV: RRR, S1S2, no murmurs, rubs or gallops  Abdominal: Soft, NT, ND +BS,   Extremities: No edema, + peripheral pulses  Skin: No Rashes, Hematoma, Ecchymosis  Lymphatic: No Neck, axilla, groin LAD  Psych: Oriented x 3, normal affect  Incisions: J tube c,d,i  Neck, Rt chest and abd incision all c/d/i, every other staple in place. No redness or drainage.   Relevant labs, radiology and Medications reviewed                        9.5    9.64  )-----------( 763      ( 16 Aug 2021 07:12 )             30.6     08-16    134<L>  |  96<L>  |  24<H>  ----------------------------<  95  5.0   |  22  |  1.38<H>    Ca    10.0      16 Aug 2021 07:12    TPro  7.7  /  Alb  3.4  /  TBili  0.8  /  DBili  x   /  AST  15  /  ALT  19  /  AlkPhos  58  08-16      MEDICATIONS  (STANDING):  acetaminophen    Suspension .. 650 milliGRAM(s) Oral every 6 hours  ALBUTerol    0.083% 2.5 milliGRAM(s) Nebulizer every 6 hours  dornase duarte Solution 2.5 milliGRAM(s) Inhalation two times a day  enoxaparin Injectable 40 milliGRAM(s) SubCutaneous daily  gabapentin   Solution 200 milliGRAM(s) Oral three times a day  nystatin Powder 1 Application(s) Topical three times a day  pantoprazole  Injectable 40 milliGRAM(s) IV Push daily  piperacillin/tazobactam IVPB.. 3.375 Gram(s) IV Intermittent every 8 hours  senna Syrup 10 milliLiter(s) Oral daily  sodium chloride 0.9%. 1000 milliLiter(s) (75 mL/Hr) IV Continuous <Continuous>  sodium chloride 3%  Inhalation 4 milliLiter(s) Inhalation every 6 hours    MEDICATIONS  (PRN):  bisacodyl Suppository 10 milliGRAM(s) Rectal daily PRN Constipation  HYDROmorphone  Injectable 0.5 milliGRAM(s) IV Push every 4 hours PRN Severe Pain (7 - 10)  naloxone Injectable 0.1 milliGRAM(s) IV Push every 3 minutes PRN For ANY of the following changes in patient status:  A. RR LESS THAN 10 breaths per minute, B. Oxygen saturation LESS THAN 90%, C. Sedation score of 6  ondansetron Injectable 4 milliGRAM(s) IV Push every 6 hours PRN Nausea  oxyCODONE    Solution 5 milliGRAM(s) Oral every 4 hours PRN Mild Pain (1 - 3)  oxyCODONE    Solution 10 milliGRAM(s) Oral every 4 hours PRN Moderate Pain (4 - 6)    Pertinent Physical Exam  I&O's Summary    16 Aug 2021 07:01  -  17 Aug 2021 07:00  --------------------------------------------------------  IN: 1930 mL / OUT: 1750 mL / NET: 180 mL        Assessment  51 y/o male with Achalasia s/p 3-Hole Esophagogastrectomy, now with increasing leukocytosis.  8/15 Febrile overnight to 102, now resolves. Wounds CDI, staples in place. WBC increasing 14. Continues to c/o fullness with tube feedings.  8/16- Cultures NGTD. No fever since 5pm last night. Pt restarted on TF. Esophagram from 8/15 could not r/o a small contained leak. kept NPO w TF.     PLAN  Neuro: Pain management. Cont current regiment. All meds elixir via J tube.   Pulm: Encourage coughing, deep breathing and use of incentive spirometry. Wean off supplemental oxygen as able. Daily CXR.   Cardio: Monitor telemetry/alarms  GI: Resumed on TF at 20cc/hr. Will titrate up slowly to goal 24hr rate of 36cc/hr w low volume free water flushes to avoid fullness. If pt tolerates, will eventually convert to nocturnal regiment. Cont strict NPO for possible contained leak. .Cont J tube teaching.   Renal: monitor urine output, supplement electrolytes as needed. some Angie likely due to dehyration, will give IVF NS @75cc/hr.   Vasc: Heparin SC/SCDs for DVT prophylaxis. Change to daily Lovenox with teaching for home.   Heme: Stable H/H. .   ID: Leukocytosis resolved. FU cultures. Trend fevers. Cont Zosyn for now.   Therapy: OOB/ambulate  Disposition: Aim to D/C to home once fevers resolved, can tolerate TF.   Discussed with Cardiothoracic Team at AM rounds.

## 2021-08-17 NOTE — CHART NOTE - NSCHARTNOTESELECT_GEN_ALL_CORE
Tube Feed Note/Event Note
Chart Note/Nutrition Services
Nutrition Follow Up/Nutrition Services
Thoracic Surgery/Event Note

## 2021-08-17 NOTE — PROGRESS NOTE ADULT - SUBJECTIVE AND OBJECTIVE BOX
CC: F/U for Aspiration    Saw/spoke to patient. No fevers, no chills. No new complaints.    Allergies  No Known Allergies    ANTIMICROBIALS:  piperacillin/tazobactam IVPB.. 3.375 every 8 hours    PE:    Vital Signs Last 24 Hrs  T(C): 37.2 (17 Aug 2021 13:18), Max: 37.4 (16 Aug 2021 20:41)  T(F): 98.9 (17 Aug 2021 13:18), Max: 99.4 (16 Aug 2021 20:41)  HR: 87 (17 Aug 2021 13:18) (86 - 102)  BP: 111/69 (17 Aug 2021 13:18) (96/54 - 111/69)  RR: 18 (17 Aug 2021 13:18) (17 - 18)  SpO2: 100% (17 Aug 2021 13:18) (93% - 100%)    Gen: AOx3, NAD, non-toxic  CV: S1+S2 normal, nontachycardic  Resp: Clear bilat, no resp distress, no crackles/wheezes  Abd: Soft, nontender, +BS, PEG  Ext: No LE edema, no wounds    LABS:                        9.5    9.64  )-----------( 763      ( 16 Aug 2021 07:12 )             30.6     08-16    134<L>  |  96<L>  |  24<H>  ----------------------------<  95  5.0   |  22  |  1.38<H>    Ca    10.0      16 Aug 2021 07:12    TPro  7.7  /  Alb  3.4  /  TBili  0.8  /  DBili  x   /  AST  15  /  ALT  19  /  AlkPhos  58  08-16    MICROBIOLOGY:    .Blood Blood-Peripheral  08-15-21   No growth to date.     .Bronchial BRONCHIAL LAVAGE  08-13-21   Testing in progress    .Bronchial BRONCHIAL LAVAGE  08-13-21   Moderate Haemophilus influenzae "Susceptibilities not performed"  Normal Respiratory Marli present  --    Moderate polymorphonuclear leukocytes per low power field  No squamous epithelial cells per low power field  Few Gram Negative Rods per oil power field    RADIOLOGY:    8/15 XR:    Frontal expiratory view of the chest shows the heart to be normal in size. Two surgical clips remain over the left lung apex.    The lungs show less right base atelectasis and there is no evidence of pneumothorax nor pleural effusion. Contrast material appears in the splenic flexure of the colon.    IMPRESSION:  Decreased atelectasis.

## 2021-08-17 NOTE — CHART NOTE - NSCHARTNOTEFT_GEN_A_CORE
NUTRITION FOLLOW-UP: Pt seen as follow up visit. Pt was started on TwoCal HN. Pt was on a high rate over 16 hours. Pt was feeling very full and distended. Hourly rate was lowered and length of time was increased. Pt stated he is tolerating the change better. Pt did have an episode of diarrhea this morning but no further GI distress.    Weight: 68 kg (8/13/21)    Labs: Na-134, BUN-24, Cr-1.38, Glu-95    Current Diet: TwoCal HN total continuous feeding of 864 ml over 24 hrs at a goal rate of 36 ml/hour.    RD to Remain Available: Analilia Greene MS, RDN, CDN pager [phone]     Additional Recommendations:   1) Monitor weight, labs, tube feeding tolerance and skin integrity.

## 2021-08-18 ENCOUNTER — TRANSCRIPTION ENCOUNTER (OUTPATIENT)
Age: 51
End: 2021-08-18

## 2021-08-18 VITALS
RESPIRATION RATE: 17 BRPM | SYSTOLIC BLOOD PRESSURE: 105 MMHG | HEART RATE: 70 BPM | TEMPERATURE: 98 F | OXYGEN SATURATION: 100 % | DIASTOLIC BLOOD PRESSURE: 71 MMHG

## 2021-08-18 RX ORDER — OXYCODONE HYDROCHLORIDE 5 MG/1
5 TABLET ORAL
Qty: 210 | Refills: 0
Start: 2021-08-18 | End: 2021-08-24

## 2021-08-18 RX ORDER — ENOXAPARIN SODIUM 100 MG/ML
40 INJECTION SUBCUTANEOUS
Qty: 14 | Refills: 0
Start: 2021-08-18 | End: 2021-08-31

## 2021-08-18 RX ORDER — FAMOTIDINE 10 MG/ML
5 INJECTION INTRAVENOUS
Qty: 150 | Refills: 0
Start: 2021-08-18 | End: 2021-09-16

## 2021-08-18 RX ORDER — SENNA PLUS 8.6 MG/1
10 TABLET ORAL
Qty: 0 | Refills: 0 | DISCHARGE
Start: 2021-08-18

## 2021-08-18 RX ORDER — ACETAMINOPHEN 500 MG
650 TABLET ORAL
Qty: 0 | Refills: 0 | DISCHARGE
Start: 2021-08-18

## 2021-08-18 RX ADMIN — Medication 650 MILLIGRAM(S): at 06:36

## 2021-08-18 RX ADMIN — PANTOPRAZOLE SODIUM 40 MILLIGRAM(S): 20 TABLET, DELAYED RELEASE ORAL at 12:18

## 2021-08-18 RX ADMIN — Medication 650 MILLIGRAM(S): at 12:18

## 2021-08-18 RX ADMIN — GABAPENTIN 200 MILLIGRAM(S): 400 CAPSULE ORAL at 14:18

## 2021-08-18 RX ADMIN — ENOXAPARIN SODIUM 40 MILLIGRAM(S): 100 INJECTION SUBCUTANEOUS at 12:18

## 2021-08-18 RX ADMIN — NYSTATIN CREAM 1 APPLICATION(S): 100000 CREAM TOPICAL at 14:19

## 2021-08-18 RX ADMIN — PIPERACILLIN AND TAZOBACTAM 25 GRAM(S): 4; .5 INJECTION, POWDER, LYOPHILIZED, FOR SOLUTION INTRAVENOUS at 06:35

## 2021-08-18 RX ADMIN — GABAPENTIN 200 MILLIGRAM(S): 400 CAPSULE ORAL at 06:36

## 2021-08-18 RX ADMIN — NYSTATIN CREAM 1 APPLICATION(S): 100000 CREAM TOPICAL at 06:36

## 2021-08-18 NOTE — DISCHARGE NOTE NURSING/CASE MANAGEMENT/SOCIAL WORK - NSDCFUADDAPPT_GEN_ALL_CORE_FT
Follow up with Dr. Goodman on Monday 8/30/21. Call to make that apt and to confirm and arrange with office having a Barium Swallow done.   Barium swallow study prior to follow up appointment with Dr. Goodman.   See your PCP for follow up in 2-3 weeks.

## 2021-08-18 NOTE — DISCHARGE NOTE NURSING/CASE MANAGEMENT/SOCIAL WORK - PATIENT PORTAL LINK FT
You can access the FollowMyHealth Patient Portal offered by [facility] by registering at the following website: [email or web address]. By joining [employer]’s FollowMyHealth portal, you will also be able to view your health information using other applications (apps) compatible with our system.

## 2021-08-18 NOTE — DISCHARGE NOTE NURSING/CASE MANAGEMENT/SOCIAL WORK - NSDCPEFALRISK_GEN_ALL_CORE
For information on Fall & injury Prevention, visit [email or web address]

## 2021-08-21 ENCOUNTER — EMERGENCY (EMERGENCY)
Facility: HOSPITAL | Age: 51
LOS: 1 days | Discharge: HOME | End: 2021-08-21
Attending: EMERGENCY MEDICINE | Admitting: EMERGENCY MEDICINE
Payer: MEDICAID

## 2021-08-21 VITALS
RESPIRATION RATE: 16 BRPM | DIASTOLIC BLOOD PRESSURE: 66 MMHG | HEART RATE: 75 BPM | TEMPERATURE: 98 F | OXYGEN SATURATION: 100 % | SYSTOLIC BLOOD PRESSURE: 108 MMHG

## 2021-08-21 VITALS
TEMPERATURE: 99 F | DIASTOLIC BLOOD PRESSURE: 68 MMHG | SYSTOLIC BLOOD PRESSURE: 111 MMHG | OXYGEN SATURATION: 100 % | RESPIRATION RATE: 15 BRPM | HEIGHT: 78 IN | HEART RATE: 85 BPM

## 2021-08-21 DIAGNOSIS — Z98.890 OTHER SPECIFIED POSTPROCEDURAL STATES: Chronic | ICD-10-CM

## 2021-08-21 DIAGNOSIS — S49.90XA UNSPECIFIED INJURY OF SHOULDER AND UPPER ARM, UNSPECIFIED ARM, INITIAL ENCOUNTER: Chronic | ICD-10-CM

## 2021-08-21 LAB
ALBUMIN SERPL ELPH-MCNC: 3.9 G/DL — SIGNIFICANT CHANGE UP (ref 3.3–5)
ALP SERPL-CCNC: 75 U/L — SIGNIFICANT CHANGE UP (ref 40–120)
ALT FLD-CCNC: 30 U/L — SIGNIFICANT CHANGE UP (ref 4–41)
ANION GAP SERPL CALC-SCNC: 15 MMOL/L — HIGH (ref 7–14)
APTT BLD: 27.4 SEC — SIGNIFICANT CHANGE UP (ref 27–36.3)
AST SERPL-CCNC: 27 U/L — SIGNIFICANT CHANGE UP (ref 4–40)
BILIRUB SERPL-MCNC: 0.5 MG/DL — SIGNIFICANT CHANGE UP (ref 0.2–1.2)
BLOOD GAS VENOUS COMPREHENSIVE RESULT: SIGNIFICANT CHANGE UP
BUN SERPL-MCNC: 25 MG/DL — HIGH (ref 7–23)
CALCIUM SERPL-MCNC: 10.1 MG/DL — SIGNIFICANT CHANGE UP (ref 8.4–10.5)
CHLORIDE SERPL-SCNC: 98 MMOL/L — SIGNIFICANT CHANGE UP (ref 98–107)
CO2 SERPL-SCNC: 24 MMOL/L — SIGNIFICANT CHANGE UP (ref 22–31)
CREAT SERPL-MCNC: 1.03 MG/DL — SIGNIFICANT CHANGE UP (ref 0.5–1.3)
CULTURE RESULTS: SIGNIFICANT CHANGE UP
GLUCOSE SERPL-MCNC: 91 MG/DL — SIGNIFICANT CHANGE UP (ref 70–99)
HCT VFR BLD CALC: 31.3 % — LOW (ref 39–50)
HGB BLD-MCNC: 9.7 G/DL — LOW (ref 13–17)
IANC: 6.51 K/UL — SIGNIFICANT CHANGE UP (ref 1.5–8.5)
INR BLD: 1.33 RATIO — HIGH (ref 0.88–1.16)
MCHC RBC-ENTMCNC: 28.4 PG — SIGNIFICANT CHANGE UP (ref 27–34)
MCHC RBC-ENTMCNC: 31 GM/DL — LOW (ref 32–36)
MCV RBC AUTO: 91.8 FL — SIGNIFICANT CHANGE UP (ref 80–100)
PLATELET # BLD AUTO: 937 K/UL — HIGH (ref 150–400)
POTASSIUM SERPL-MCNC: 5 MMOL/L — SIGNIFICANT CHANGE UP (ref 3.5–5.3)
POTASSIUM SERPL-SCNC: 5 MMOL/L — SIGNIFICANT CHANGE UP (ref 3.5–5.3)
PROT SERPL-MCNC: 8.1 G/DL — SIGNIFICANT CHANGE UP (ref 6–8.3)
PROTHROM AB SERPL-ACNC: 15.1 SEC — HIGH (ref 10.6–13.6)
RBC # BLD: 3.41 M/UL — LOW (ref 4.2–5.8)
RBC # FLD: 12 % — SIGNIFICANT CHANGE UP (ref 10.3–14.5)
SODIUM SERPL-SCNC: 137 MMOL/L — SIGNIFICANT CHANGE UP (ref 135–145)
SPECIMEN SOURCE: SIGNIFICANT CHANGE UP
WBC # BLD: 10.17 K/UL — SIGNIFICANT CHANGE UP (ref 3.8–10.5)
WBC # FLD AUTO: 10.17 K/UL — SIGNIFICANT CHANGE UP (ref 3.8–10.5)

## 2021-08-21 PROCEDURE — 99285 EMERGENCY DEPT VISIT HI MDM: CPT

## 2021-08-21 RX ORDER — SODIUM CHLORIDE 9 MG/ML
1000 INJECTION INTRAMUSCULAR; INTRAVENOUS; SUBCUTANEOUS ONCE
Refills: 0 | Status: COMPLETED | OUTPATIENT
Start: 2021-08-21 | End: 2021-08-21

## 2021-08-21 RX ADMIN — SODIUM CHLORIDE 1000 MILLILITER(S): 9 INJECTION INTRAMUSCULAR; INTRAVENOUS; SUBCUTANEOUS at 23:15

## 2021-08-21 NOTE — ED ADULT NURSE NOTE - NSIMPLEMENTINTERV_GEN_ALL_ED
Implemented All Fall with Harm Risk Interventions:  [place]unter to call system. Call bell, personal items and telephone within reach. Instruct patient to call for assistance. Room bathroom lighting operational. Non-slip footwear when patient is off stretcher. Physically safe environment: no spills, clutter or unnecessary equipment. Stretcher in lowest position, wheels locked, appropriate side rails in place. Provide visual cue, wrist band, yellow gown, etc. Monitor gait and stability. Monitor for mental status changes and reorient to person, place, and time. Review medications for side effects contributing to fall risk. Reinforce activity limits and safety measures with patient and family. Provide visual clues: red socks.

## 2021-08-21 NOTE — ED PROVIDER NOTE - PHYSICAL EXAMINATION
Dr Padilla  nontoxic male, ambulatory in ED  no work of breathing. talking  in full sentences  thin male +Jtube on the LUQ, tender to touch. no discharge or swelling to insertion site, Note tube is on side of pt  dislodged    AO3 Dr Padilla  nontoxic male, ambulatory in ED  no work of breathing. talking  in full sentences  thin male +Jtube on the LUQ, tender to touch. no discharge or swelling to insertion site, Note tube is on side of pt  dislodged    AO3    stanier:  Gen: WDWN, NAD, chronically ill appearing  HEENT: EOMI, no nasal discharge, mucous membranes moist  CV: RRR, +S1/S2, no M/R/G  Resp: CTAB, no W/R/R  GI: J tube stitches torn and tube approximately 10cm displaced, mild surrounding tenderness w/o overlying skin change. Nonperitoneal, no guarding.   MSK: No open wounds, no bruising, no LE edema  Neuro: A&Ox4, following commands, moving all four extremities spontaneously  Psych: appropriate mood, denies AH, VH, SI

## 2021-08-21 NOTE — ED ADULT NURSE NOTE - CHIEF COMPLAINT QUOTE
alert  states had abd surgery 8/3/21 at [facility] was admitted 18days d/c 3 days ago  states J tube is loose  and site is painful pain started tonight  has been using it for feeding  hx achalasia

## 2021-08-21 NOTE — ED ADULT NURSE NOTE - OBJECTIVE STATEMENT
Patient arrives to the ED for j tube displacement he noticed today. A&Ox4. Patient had the tube placed 08/03/2021 but will not verbalize why. Patient breathing even and nonlabored. Denies any headache, chest pain, SOB, dizziness, or pain at the site. Tube appears displaced, tube in LLQ. No s/sx of infection noted. Belly soft, nondistended, and nontender. Patient reports he is on a blood thinner but is unsure why or which one. Patient a poor historian, states  Nurse " is asking a lot of questions". Patient arrives to the ED for j tube displacement he noticed today. A&Ox4. Patient had the tube placed 08/03/2021 but will not verbalize why. Patient breathing even and nonlabored. Denies any headache, chest pain, SOB, dizziness, or pain at the site. Tube appears displaced, tube in LLQ. No s/sx of infection noted. Belly soft, nondistended, and nontender. Patient reports he is on a blood thinner but is unsure why or which one. Patient a poor historian, states  Nurse " is asking a lot of questions". 20g IV placed to left arm. Labs sent as ordered. COVID swab sent. Safety maintained. Patient stable upon exiting the room. Patient arrives to the ED for j tube displacement he noticed today. A&Ox4. Patient had the tube placed 08/03/2021 but will not verbalize why. Patient breathing even and nonlabored. Denies any headache, chest pain, SOB, dizziness, or pain at the site. Tube appears displaced, tube in LLQ. No s/sx of infection noted. Belly soft, nondistended, and nontender. Patient reports he is on a blood thinner but is unsure why or which one. Patient reports he did a feeding earlier today and he felt like the feed went in.  Patient a poor historian, states  Nurse " is asking a lot of questions". 20g IV placed to left arm. Labs sent as ordered. COVID swab sent. Safety maintained. Patient stable upon exiting the room.

## 2021-08-21 NOTE — ED PROVIDER NOTE - PATIENT PORTAL LINK FT
You can access the FollowMyHealth Patient Portal offered by [facility] by registering at the following website: [email or web address]. By joining [employer]’s FollowMyHealth portal, you will also be able to view your health information using other applications (apps) compatible with our system.

## 2021-08-21 NOTE — ED ADULT TRIAGE NOTE - CHIEF COMPLAINT QUOTE
alert  states had abd surgery 8/3/21 at [facility] was admitted 18days d/c 3 days ago  states J tube is loose  and site is painful pain started tonight  has been using it for feeding  hx achalasia

## 2021-08-21 NOTE — ED PROVIDER NOTE - PROGRESS NOTE DETAILS
pt no distress pending labs/ct and CT surgery consult. sign out to night team cassie: per rad pt w/ appropriate tube placement. Pt does not want to wait for CTr, wants to home. Will call for acute change, he is ok with that.

## 2021-08-21 NOTE — ED PROVIDER NOTE - OBJECTIVE STATEMENT
Dr Padilla   50yMale s/p Flex bronch, R robotic-assisted VATS mobilization of esophagus, robotic-assisted laparoscopic converted to open [2/2 dense adhesions of celiac trunk / lesser curve of stomach to liver], mobilization of gastric conduit, pyloroplasty. Neck incision for completion of proximal anastomosis.  Placement of jejunostomy tube 8/3/2021 pw "my J tube is loose" and "pain" tonight. No fever. no vomit. no sob. Dr Padilla   50yMale s/p Flex bronch, R robotic-assisted VATS mobilization of esophagus, robotic-assisted laparoscopic converted to open [2/2 dense adhesions of celiac trunk / lesser curve of stomach to liver], mobilization of gastric conduit, pyloroplasty. Neck incision for completion of proximal anastomosis.  Placement of jejunostomy tube 8/3/2021 pw "my J tube is loose" and "pain" tonight. No fever. no vomit. no sob.    Stanier: pt reports tube is loose w/ torn stitches, is coming out of abdomen. States abdomen mildly tender in vicinity of tube. Denies f/c/n, no changes in stooling. States last fed self tonight w/o difficulty, at that time noticed dislodged tube.

## 2021-08-22 LAB
BASOPHILS # BLD AUTO: 0.09 K/UL — SIGNIFICANT CHANGE UP (ref 0–0.2)
BASOPHILS NFR BLD AUTO: 0.9 % — SIGNIFICANT CHANGE UP (ref 0–2)
BLD GP AB SCN SERPL QL: NEGATIVE — SIGNIFICANT CHANGE UP
EOSINOPHIL # BLD AUTO: 0.18 K/UL — SIGNIFICANT CHANGE UP (ref 0–0.5)
EOSINOPHIL NFR BLD AUTO: 1.8 % — SIGNIFICANT CHANGE UP (ref 0–6)
LYMPHOCYTES # BLD AUTO: 1.7 K/UL — SIGNIFICANT CHANGE UP (ref 1–3.3)
LYMPHOCYTES # BLD AUTO: 16.7 % — SIGNIFICANT CHANGE UP (ref 13–44)
MONOCYTES # BLD AUTO: 0.98 K/UL — HIGH (ref 0–0.9)
MONOCYTES NFR BLD AUTO: 9.6 % — SIGNIFICANT CHANGE UP (ref 2–14)
NEUTROPHILS # BLD AUTO: 6.6 K/UL — SIGNIFICANT CHANGE UP (ref 1.8–7.4)
NEUTROPHILS NFR BLD AUTO: 64.9 % — SIGNIFICANT CHANGE UP (ref 43–77)
RH IG SCN BLD-IMP: POSITIVE — SIGNIFICANT CHANGE UP
SARS-COV-2 RNA SPEC QL NAA+PROBE: SIGNIFICANT CHANGE UP

## 2021-08-22 PROCEDURE — 74177 CT ABD & PELVIS W/CONTRAST: CPT | Mod: 26

## 2021-08-22 PROCEDURE — 74018 RADEX ABDOMEN 1 VIEW: CPT | Mod: 26

## 2021-08-22 RX ORDER — IOHEXOL 300 MG/ML
30 INJECTION, SOLUTION INTRAVENOUS ONCE
Refills: 0 | Status: DISCONTINUED | OUTPATIENT
Start: 2021-08-22 | End: 2021-08-22

## 2021-08-22 RX ORDER — DIATRIZOATE MEGLUMINE 180 MG/ML
30 INJECTION, SOLUTION INTRAVESICAL ONCE
Refills: 0 | Status: DISCONTINUED | OUTPATIENT
Start: 2021-08-22 | End: 2021-08-25

## 2021-08-22 NOTE — PROGRESS NOTE ADULT - SUBJECTIVE AND OBJECTIVE BOX
Pt had a J-tube inserted 8/3/21 along with his esophagogastrectomy.  He presented to the ER after the suture had broken and the tube had become partially dislodged.  The tube was replaced to its normal position and sutured in place.  Placement was confirmed by an X-ray with contrast through the J-tube that showed the contrast in the small bowel.  Pt was discharged home from the ER.  Case discussed with Dr Goodman.

## 2021-08-24 ENCOUNTER — APPOINTMENT (OUTPATIENT)
Dept: THORACIC SURGERY | Facility: CLINIC | Age: 51
End: 2021-08-24
Payer: MEDICAID

## 2021-08-24 VITALS
SYSTOLIC BLOOD PRESSURE: 124 MMHG | HEIGHT: 71 IN | DIASTOLIC BLOOD PRESSURE: 91 MMHG | WEIGHT: 130 LBS | BODY MASS INDEX: 18.2 KG/M2 | TEMPERATURE: 98.6 F | HEART RATE: 88 BPM | RESPIRATION RATE: 18 BRPM | OXYGEN SATURATION: 98 %

## 2021-08-24 PROCEDURE — 99024 POSTOP FOLLOW-UP VISIT: CPT

## 2021-08-26 ENCOUNTER — NON-APPOINTMENT (OUTPATIENT)
Age: 51
End: 2021-08-26

## 2021-08-30 ENCOUNTER — APPOINTMENT (OUTPATIENT)
Dept: THORACIC SURGERY | Facility: CLINIC | Age: 51
End: 2021-08-30
Payer: MEDICAID

## 2021-08-30 VITALS
WEIGHT: 131 LBS | BODY MASS INDEX: 18.34 KG/M2 | DIASTOLIC BLOOD PRESSURE: 75 MMHG | RESPIRATION RATE: 18 BRPM | TEMPERATURE: 98.4 F | HEART RATE: 112 BPM | SYSTOLIC BLOOD PRESSURE: 105 MMHG | OXYGEN SATURATION: 100 % | HEIGHT: 71 IN

## 2021-08-30 PROCEDURE — 99024 POSTOP FOLLOW-UP VISIT: CPT

## 2021-08-30 RX ORDER — GABAPENTIN 100 MG/1
100 CAPSULE ORAL 3 TIMES DAILY
Qty: 90 | Refills: 0 | Status: COMPLETED | COMMUNITY
Start: 2021-08-30 | End: 2021-08-30

## 2021-09-08 ENCOUNTER — RESULT REVIEW (OUTPATIENT)
Age: 51
End: 2021-09-08

## 2021-09-13 ENCOUNTER — APPOINTMENT (OUTPATIENT)
Dept: THORACIC SURGERY | Facility: CLINIC | Age: 51
End: 2021-09-13
Payer: MEDICAID

## 2021-09-13 VITALS
HEART RATE: 92 BPM | SYSTOLIC BLOOD PRESSURE: 119 MMHG | TEMPERATURE: 98.3 F | OXYGEN SATURATION: 99 % | WEIGHT: 146 LBS | RESPIRATION RATE: 18 BRPM | DIASTOLIC BLOOD PRESSURE: 81 MMHG | BODY MASS INDEX: 20.44 KG/M2 | HEIGHT: 71 IN

## 2021-09-13 DIAGNOSIS — G89.18 OTHER ACUTE POSTPROCEDURAL PAIN: ICD-10-CM

## 2021-09-13 PROCEDURE — 99024 POSTOP FOLLOW-UP VISIT: CPT

## 2021-09-15 ENCOUNTER — APPOINTMENT (OUTPATIENT)
Dept: THORACIC SURGERY | Facility: CLINIC | Age: 51
End: 2021-09-15

## 2021-09-15 DIAGNOSIS — M25.472 EFFUSION, RIGHT ANKLE: ICD-10-CM

## 2021-09-15 DIAGNOSIS — M25.471 EFFUSION, RIGHT ANKLE: ICD-10-CM

## 2021-09-15 DIAGNOSIS — M79.89 OTHER SPECIFIED SOFT TISSUE DISORDERS: ICD-10-CM

## 2021-09-15 RX ORDER — OXYCODONE HYDROCHLORIDE 5 MG/5ML
5 SOLUTION ORAL
Refills: 0 | Status: COMPLETED | COMMUNITY
End: 2021-09-15

## 2021-09-15 RX ORDER — GABAPENTIN 100 MG/1
100 CAPSULE ORAL
Refills: 0 | Status: COMPLETED | COMMUNITY
End: 2021-09-15

## 2021-09-16 ENCOUNTER — NON-APPOINTMENT (OUTPATIENT)
Age: 51
End: 2021-09-16

## 2021-09-16 ENCOUNTER — RESULT REVIEW (OUTPATIENT)
Age: 51
End: 2021-09-16

## 2021-09-16 PROBLEM — M79.89 LEG SWELLING: Status: ACTIVE | Noted: 2021-09-16

## 2021-09-16 PROBLEM — M25.471 SWELLING OF BOTH ANKLES: Status: ACTIVE | Noted: 2021-09-16

## 2021-09-17 ENCOUNTER — APPOINTMENT (OUTPATIENT)
Dept: ULTRASOUND IMAGING | Facility: CLINIC | Age: 51
End: 2021-09-17
Payer: MEDICAID

## 2021-09-17 ENCOUNTER — OUTPATIENT (OUTPATIENT)
Dept: OUTPATIENT SERVICES | Facility: HOSPITAL | Age: 51
LOS: 1 days | End: 2021-09-17
Payer: MEDICAID

## 2021-09-17 DIAGNOSIS — S49.90XA UNSPECIFIED INJURY OF SHOULDER AND UPPER ARM, UNSPECIFIED ARM, INITIAL ENCOUNTER: Chronic | ICD-10-CM

## 2021-09-17 DIAGNOSIS — Z98.890 OTHER SPECIFIED POSTPROCEDURAL STATES: Chronic | ICD-10-CM

## 2021-09-17 DIAGNOSIS — M25.471 EFFUSION, RIGHT ANKLE: ICD-10-CM

## 2021-09-17 PROCEDURE — 93970 EXTREMITY STUDY: CPT

## 2021-09-17 PROCEDURE — 93970 EXTREMITY STUDY: CPT | Mod: 26

## 2021-09-20 ENCOUNTER — APPOINTMENT (OUTPATIENT)
Dept: THORACIC SURGERY | Facility: CLINIC | Age: 51
End: 2021-09-20
Payer: MEDICAID

## 2021-09-20 VITALS
SYSTOLIC BLOOD PRESSURE: 114 MMHG | TEMPERATURE: 98.1 F | DIASTOLIC BLOOD PRESSURE: 77 MMHG | RESPIRATION RATE: 18 BRPM | WEIGHT: 146 LBS | HEIGHT: 71 IN | BODY MASS INDEX: 20.44 KG/M2 | HEART RATE: 82 BPM | OXYGEN SATURATION: 99 %

## 2021-09-20 PROCEDURE — 99024 POSTOP FOLLOW-UP VISIT: CPT

## 2021-09-20 RX ORDER — OXYCODONE HYDROCHLORIDE 5 MG/5ML
5 SOLUTION ORAL EVERY 6 HOURS
Qty: 140 | Refills: 0 | Status: COMPLETED | COMMUNITY
Start: 2021-08-30 | End: 2021-09-20

## 2021-12-13 ENCOUNTER — APPOINTMENT (OUTPATIENT)
Dept: THORACIC SURGERY | Facility: CLINIC | Age: 51
End: 2021-12-13
Payer: MEDICAID

## 2021-12-13 VITALS
SYSTOLIC BLOOD PRESSURE: 122 MMHG | DIASTOLIC BLOOD PRESSURE: 78 MMHG | HEART RATE: 80 BPM | HEIGHT: 71 IN | WEIGHT: 156 LBS | RESPIRATION RATE: 18 BRPM | BODY MASS INDEX: 21.84 KG/M2 | OXYGEN SATURATION: 100 %

## 2021-12-13 PROCEDURE — 99214 OFFICE O/P EST MOD 30 MIN: CPT

## 2021-12-13 RX ORDER — GABAPENTIN 100 MG/1
100 CAPSULE ORAL 3 TIMES DAILY
Qty: 90 | Refills: 0 | Status: COMPLETED | COMMUNITY
Start: 2021-08-30 | End: 2021-12-13

## 2021-12-13 RX ORDER — OXYCODONE 5 MG/1
5 TABLET ORAL EVERY 8 HOURS
Qty: 15 | Refills: 0 | Status: COMPLETED | COMMUNITY
Start: 2021-09-20 | End: 2021-12-13

## 2021-12-13 NOTE — PHYSICAL EXAM
[Auscultation Breath Sounds / Voice Sounds] : lungs were clear to auscultation bilaterally [Heart Rate And Rhythm] : heart rate was normal and rhythm regular [Heart Sounds] : normal S1 and S2 [Heart Sounds Gallop] : no gallops [Murmurs] : no murmurs [Heart Sounds Pericardial Friction Rub] : no pericardial rub [Examination Of The Chest] : the chest was normal in appearance [Chest Visual Inspection Thoracic Asymmetry] : no chest asymmetry [Diminished Respiratory Excursion] : normal chest expansion [Bowel Sounds] : normal bowel sounds [Abdomen Tenderness] : non-tender [Abdomen Soft] : soft [Abdomen Mass (___ Cm)] : no abdominal mass palpated [Skin Color & Pigmentation] : normal skin color and pigmentation [Skin Turgor] : normal skin turgor [] : no rash

## 2021-12-13 NOTE — HISTORY OF PRESENT ILLNESS
[FreeTextEntry1] : Mr. PRATEEK THOMPSON, 51 year old male, former smoker, w/ hx of achalasia s/p Laparoscopy myotomy in ~2001, who presented to GI Dr. Lazaro c/o wt loss of 30 lbs within the past 2-3 months, +N/V, food regurgitation (solids and liquids), and 10/10 acid reflux.\par \par Barium esophagram on 3/8/21: dilated debris-filled esophagus c/w known achalasia. \par \par EGD on 5/24/21 showed moderately dilated lumen of esophagus and food was found in the entire esophagus. GEJ was somewhat tight with a suture present in GEJ from prior fundoplication which appeared to potential displaced. \par \par Manometry on 5/24/21 showed incomplete LES relaxation and 100% absent peristalsis on all swallows\par \par PFTs on 7/16/21: %, FEV1 103%, DLCO 79%\par \par CT Chest/A/P with oral contrast on 7/30/21:\par - 2 mm nodule with adjacent ggo density in LLL, likely post-inflammatory\par - trace Lt apical pleural thickening\par - diffuse marked dilation of the esophagus up to 6.7 cm, distended with retained food matter, oral contrast and gas with abrupt transition to the level of GE junction\par - two surgical clips adjacent to GE junction\par - 2 mm nonobstructing stone in the mid to lower pole of Lt kidney\par - achalasia\par \par Now 4 mons s/p EGD, laparoscopic, robotic-assisted, Rt VATS w/ esophageal dissection, laparoscopy and enterolysis, conversion to laparotomy, Lt neck incision, three holes esophagectomy, pyloroplasty, J-tube placement, and bronch on 8/3/21. \par He remained intubated on POD1 for airway protection, s/p Flex Bronch on 8/4/21.\par He developed leukocytosis on POD9, CT unremarkable, s/p Flex Bronch w/ BAL and EGD on POD10, 8/13/21.\par \par Barium esophagram on 8/26/21:\par - post-op surgery and gastric pull-up\par - contrast leakage identified at the Rt posterior margin of anastomotic site at the level of T3-T4 that is locally contained\par \par 8/30/21- Called pt and asked to maintain NPO and only uses J-tube. \par \par Barium esophagram on 9/8/21:\par - post-op changes and gastric pull-up\par - previous leakage is no longer identified\par \par J-tube re-stitched 9/13/21 in the office.\par \par Pt called and c/o J-tube site pain and need re-stitch. PA re-stitched the J-tube on 9/15/21. \par \par Bilateral LE U/S on 9/17/21 negative for DVT. \par \par Pt presents today for follow up. Pt reports eating well and gained weight since last visit. However, hr c/o pain when eats too much, more BM and pain around the old incision sites. Denies dysphagia, N/V, abdominal pain, weight loss or CP. \par

## 2021-12-13 NOTE — ASSESSMENT
[FreeTextEntry1] : Mr. PRATEEK THOMPSON, 51 year old male, former smoker, w/ hx of achalasia s/p Laparoscopy myotomy in ~2001, who presented to GI Dr. Lazaro c/o wt loss of 30 lbs within the past 2-3 months, +N/V, food regurgitation (solids and liquids), and 10/10 acid reflux.\par \par Barium esophagram on 3/8/21: dilated debris-filled esophagus c/w known achalasia. \par \par EGD on 5/24/21 showed moderately dilated lumen of esophagus and food was found in the entire esophagus. GEJ was somewhat tight with a suture present in GEJ from prior fundoplication which appeared to potential displaced. \par \par Manometry on 5/24/21 showed incomplete LES relaxation and 100% absent peristalsis on all swallows\par \par PFTs on 7/16/21: %, FEV1 103%, DLCO 79%\par \par CT Chest/A/P with oral contrast on 7/30/21:\par - 2 mm nodule with adjacent ggo density in LLL, likely post-inflammatory\par - trace Lt apical pleural thickening\par - diffuse marked dilation of the esophagus up to 6.7 cm, distended with retained food matter, oral contrast and gas with abrupt transition to the level of GE junction\par - two surgical clips adjacent to GE junction\par - 2 mm nonobstructing stone in the mid to lower pole of Lt kidney\par - achalasia\par \par Now 4 mons s/p EGD, laparoscopic, robotic-assisted, Rt VATS w/ esophageal dissection, laparoscopy and enterolysis, conversion to laparotomy, Lt neck incision, three holes esophagectomy, pyloroplasty, J-tube placement, and bronch on 8/3/21. \par He remained intubated on POD1 for airway protection, s/p Flex Bronch on 8/4/21.\par He developed leukocytosis on POD9, CT unremarkable, s/p Flex Bronch w/ BAL and EGD on POD10, 8/13/21.\par \par Barium esophagram on 8/26/21:\par - post-op surgery and gastric pull-up\par - contrast leakage identified at the Rt posterior margin of anastomotic site at the level of T3-T4 that is locally contained\par \par 8/30/21- Called pt and asked to maintain NPO and only uses J-tube. \par \par Barium esophagram on 9/8/21:\par - post-op changes and gastric pull-up\par - previous leakage is no longer identified\par \par J-tube re-stitched 9/13/21 in the office.\par \par Pt called and c/o J-tube site pain and need re-stitch. PA re-stitched the J-tube on 9/15/21. \par \par Bilateral LE U/S on 9/17/21 negative for DVT. \par \par I have reviewed the patient's medical records and diagnostic images at time of this office consultation and have made the following recommendation:\par 1. CXR now and will review. \par 2. RTC in Aug 2022 with Barium esophagram \par 3. Rx Neurontin for nerve pain\par \par \par I personally performed the services described in the documentation, reviewed the documentation recorded by the scribe in my presence and it accurately and completely records my words and actions. \par \par I, Lindsay Irene, NP, am scribing for and the presence of Dr. Anh Goodman the following sections, HISTORY OF PRESENT ILLNESS, PAST MEDICAL/FAMILY/SOCIAL HISTORY; REVIEW OF SYSTEMS; VITAL SIGNS; PHYSICAL EXAM; DISPOSITION.\par

## 2021-12-13 NOTE — CONSULT LETTER
[FreeTextEntry2] : Dr. Cristhian Lazaro (GI/ref)  [FreeTextEntry3] : Nikolas Faith MD, MPH \par System Director of Thoracic Surgery \par Director of Comprehensive Lung and Foregut [place] \par Professor Cardiovascular & Thoracic Surgery  \par [facility] [employer] at [facility]\par

## 2022-07-01 ENCOUNTER — NON-APPOINTMENT (OUTPATIENT)
Age: 52
End: 2022-07-01

## 2022-07-18 ENCOUNTER — NON-APPOINTMENT (OUTPATIENT)
Age: 52
End: 2022-07-18

## 2022-07-22 ENCOUNTER — NON-APPOINTMENT (OUTPATIENT)
Age: 52
End: 2022-07-22

## 2022-08-05 ENCOUNTER — APPOINTMENT (OUTPATIENT)
Dept: RADIOLOGY | Facility: HOSPITAL | Age: 52
End: 2022-08-05
Payer: MEDICAID

## 2022-09-13 ENCOUNTER — NON-APPOINTMENT (OUTPATIENT)
Age: 52
End: 2022-09-13

## 2022-11-18 ENCOUNTER — NON-APPOINTMENT (OUTPATIENT)
Age: 52
End: 2022-11-18

## 2022-11-21 ENCOUNTER — APPOINTMENT (OUTPATIENT)
Dept: THORACIC SURGERY | Facility: CLINIC | Age: 52
End: 2022-11-21
Payer: MEDICAID

## 2022-11-30 ENCOUNTER — NON-APPOINTMENT (OUTPATIENT)
Age: 52
End: 2022-11-30

## 2023-01-27 ENCOUNTER — APPOINTMENT (OUTPATIENT)
Dept: RADIOLOGY | Facility: HOSPITAL | Age: 53
End: 2023-01-27

## 2023-01-27 ENCOUNTER — OUTPATIENT (OUTPATIENT)
Dept: OUTPATIENT SERVICES | Facility: HOSPITAL | Age: 53
LOS: 1 days | End: 2023-01-27

## 2023-01-27 DIAGNOSIS — S49.90XA UNSPECIFIED INJURY OF SHOULDER AND UPPER ARM, UNSPECIFIED ARM, INITIAL ENCOUNTER: Chronic | ICD-10-CM

## 2023-01-27 DIAGNOSIS — Z98.890 OTHER SPECIFIED POSTPROCEDURAL STATES: Chronic | ICD-10-CM

## 2023-01-27 DIAGNOSIS — K22.0 ACHALASIA OF CARDIA: ICD-10-CM

## 2023-01-27 PROCEDURE — 74220 X-RAY XM ESOPHAGUS 1CNTRST: CPT | Mod: 26

## 2023-01-29 PROBLEM — R13.19 ESOPHAGEAL DYSPHAGIA: Status: ACTIVE | Noted: 2021-04-06

## 2023-01-30 ENCOUNTER — APPOINTMENT (OUTPATIENT)
Dept: THORACIC SURGERY | Facility: CLINIC | Age: 53
End: 2023-01-30
Payer: MEDICAID

## 2023-01-30 VITALS
RESPIRATION RATE: 18 BRPM | HEART RATE: 82 BPM | SYSTOLIC BLOOD PRESSURE: 125 MMHG | OXYGEN SATURATION: 98 % | WEIGHT: 163 LBS | DIASTOLIC BLOOD PRESSURE: 83 MMHG | BODY MASS INDEX: 22.82 KG/M2 | HEIGHT: 71 IN

## 2023-01-30 DIAGNOSIS — R13.19 OTHER DYSPHAGIA: ICD-10-CM

## 2023-01-30 PROCEDURE — 99214 OFFICE O/P EST MOD 30 MIN: CPT

## 2023-01-30 NOTE — ASSESSMENT
[FreeTextEntry1] : Mr. PRATEEK THOMPSON, 52 year old male, former smoker, w/ hx of achalasia s/p Laparoscopy myotomy in ~2001, who presented to GI Dr. Lazaro c/o wt loss of 30 lbs within the past 2-3 months, +N/V, food regurgitation (solids and liquids), and 10/10 acid reflux.\par \par Barium esophagram on 3/8/21: dilated debris-filled esophagus c/w known achalasia. \par \par EGD on 5/24/21 showed moderately dilated lumen of esophagus and food was found in the entire esophagus. GEJ was somewhat tight with a suture present in GEJ from prior fundoplication which appeared to potential displaced. \par \par Manometry on 5/24/21 showed incomplete LES relaxation and 100% absent peristalsis on all swallows.\par \par I have reviewed the patient's medical records and diagnostic images at time of this office consultation and have made the following recommendation:\par 1. Patient reports pain with movement, referred to Dr. Khan (pain management).\par 2. RTC in 1 year with xray esophagram. \par 3. Continue follow up with GI.\par \par \par I, Dr. VILLALPANDO, HODA SALOMON, personally performed the evaluation and management (E/M) services for this established patient. That E/M includes conducting the examination, assessing all new/exacerbated conditions, and establishing a new plan of care. Today, my ACP, Abhishek Marshall NP, was here to observe my evaluation and management services for this new problem/exacerbated condition to be followed going forward.\par \par

## 2023-01-30 NOTE — HISTORY OF PRESENT ILLNESS
[FreeTextEntry1] : Mr. PRATEEK THOMPSON, 52 year old male, former smoker, w/ hx of achalasia s/p Laparoscopy myotomy in ~2001, who presented to GI Dr. Lazaro c/o wt loss of 30 lbs within the past 2-3 months, +N/V, food regurgitation (solids and liquids), and 10/10 acid reflux.\par \par Barium esophagram on 3/8/21: dilated debris-filled esophagus c/w known achalasia. \par \par EGD on 5/24/21 showed moderately dilated lumen of esophagus and food was found in the entire esophagus. GEJ was somewhat tight with a suture present in GEJ from prior fundoplication which appeared to potential displaced. \par \par Manometry on 5/24/21 showed incomplete LES relaxation and 100% absent peristalsis on all swallows\par \par PFTs on 7/16/21: %, FEV1 103%, DLCO 79%\par \par CT Chest/A/P with oral contrast on 7/30/21:\par - 2 mm nodule with adjacent ggo density in LLL, likely post-inflammatory\par - trace Lt apical pleural thickening\par - diffuse marked dilation of the esophagus up to 6.7 cm, distended with retained food matter, oral contrast and gas with abrupt transition to the level of GE junction\par - two surgical clips adjacent to GE junction\par - 2 mm nonobstructing stone in the mid to lower pole of Lt kidney\par - achalasia\par \par Now 1 yr 5m s/p EGD, laparoscopic, robotic-assisted, Rt VATS w/ esophageal dissection, laparoscopy and enterolysis, conversion to laparotomy, Lt neck incision, three holes esophagectomy, pyloroplasty, J-tube placement, and bronch on 8/3/21. \par He remained intubated on POD1 for airway protection, s/p Flex Bronch on 8/4/21.\par He developed leukocytosis on POD9, CT unremarkable, s/p Flex Bronch w/ BAL and EGD on POD10, 8/13/21.\par \par Barium esophagram on 8/26/21:\par - post-op surgery and gastric pull-up\par - contrast leakage identified at the Rt posterior margin of anastomotic site at the level of T3-T4 that is locally contained\par \par 8/30/21- Called pt and asked to maintain NPO and only uses J-tube. \par \par Barium esophagram on 9/8/21:\par - post-op changes and gastric pull-up\par - previous leakage is no longer identified\par \par J-tube re-stitched 9/13/21 in the office.\par \par Pt called and c/o J-tube site pain and need re-stitch. PA re-stitched the J-tube on 9/15/21. \par \par Bilateral LE U/S on 9/17/21 negative for DVT. \par \par Barium esophagram on 1/27/23:\par - Status post esophagectomy with gastric pull-through with normal postoperative appearance.\par - No evidence of leak perforation.\par \par Patient is here today for a follow up. He reports right sided chest pain with movement 7/10 when present, on gabapentin with minimum relief. Otherwise denies any shortness of breath, cough, or hemoptysis. \par

## 2023-01-30 NOTE — CONSULT LETTER
[FreeTextEntry2] : Dr. Cristhian Lazaro (GI/ref)  [FreeTextEntry3] : Nikolas Faith MD, MPH \par System Director of Thoracic Surgery \par Director of Comprehensive Lung and Foregut [place] \par Professor Cardiovascular & Thoracic Surgery  \par [facility] [employer] at [facility]\par

## 2023-03-15 ENCOUNTER — NON-APPOINTMENT (OUTPATIENT)
Age: 53
End: 2023-03-15

## 2023-09-13 NOTE — H&P PST ADULT - PRO TOBACCO TYPE
Extraction Method: sterile needle Treatment Type Override: clinical facial Detail Level: Zone Comments (Sticky): cleanse, acetone, dermaplane, dermapeel, gentle cleanse, signature peel and Vit C mask, phyto mask, CE ferulic, uv elements. cigarettes

## 2023-10-13 ENCOUNTER — OUTPATIENT (OUTPATIENT)
Dept: OUTPATIENT SERVICES | Facility: HOSPITAL | Age: 53
LOS: 1 days | End: 2023-10-13
Payer: MEDICAID

## 2023-10-13 ENCOUNTER — APPOINTMENT (OUTPATIENT)
Dept: CT IMAGING | Facility: IMAGING CENTER | Age: 53
End: 2023-10-13
Payer: MEDICAID

## 2023-10-13 DIAGNOSIS — R13.19 OTHER DYSPHAGIA: ICD-10-CM

## 2023-10-13 DIAGNOSIS — S49.90XA UNSPECIFIED INJURY OF SHOULDER AND UPPER ARM, UNSPECIFIED ARM, INITIAL ENCOUNTER: Chronic | ICD-10-CM

## 2023-10-13 DIAGNOSIS — K22.0 ACHALASIA OF CARDIA: ICD-10-CM

## 2023-10-13 DIAGNOSIS — Z98.890 OTHER SPECIFIED POSTPROCEDURAL STATES: Chronic | ICD-10-CM

## 2023-10-13 PROCEDURE — 71250 CT THORAX DX C-: CPT

## 2023-10-13 PROCEDURE — 71250 CT THORAX DX C-: CPT | Mod: 26

## 2023-10-16 ENCOUNTER — APPOINTMENT (OUTPATIENT)
Dept: THORACIC SURGERY | Facility: CLINIC | Age: 53
End: 2023-10-16
Payer: MEDICAID

## 2023-10-16 VITALS
RESPIRATION RATE: 17 BRPM | OXYGEN SATURATION: 98 % | HEIGHT: 71 IN | HEART RATE: 82 BPM | SYSTOLIC BLOOD PRESSURE: 164 MMHG | WEIGHT: 172 LBS | DIASTOLIC BLOOD PRESSURE: 102 MMHG | BODY MASS INDEX: 24.08 KG/M2

## 2023-10-16 DIAGNOSIS — K22.0 ACHALASIA OF CARDIA: ICD-10-CM

## 2023-10-16 PROCEDURE — 99213 OFFICE O/P EST LOW 20 MIN: CPT

## 2023-10-16 RX ORDER — GABAPENTIN 100 MG/1
100 CAPSULE ORAL 3 TIMES DAILY
Qty: 90 | Refills: 0 | Status: COMPLETED | COMMUNITY
Start: 2021-12-13 | End: 2023-10-16

## 2023-12-14 ENCOUNTER — APPOINTMENT (OUTPATIENT)
Dept: THORACIC SURGERY | Facility: CLINIC | Age: 53
End: 2023-12-14

## 2023-12-14 ENCOUNTER — NON-APPOINTMENT (OUTPATIENT)
Age: 53
End: 2023-12-14

## 2024-01-26 ENCOUNTER — NON-APPOINTMENT (OUTPATIENT)
Age: 54
End: 2024-01-26

## 2024-01-29 ENCOUNTER — APPOINTMENT (OUTPATIENT)
Dept: THORACIC SURGERY | Facility: CLINIC | Age: 54
End: 2024-01-29

## 2024-07-19 NOTE — H&P PST ADULT - STREET DRUG/MEDICATION/INHALANT, DURATION OF USE, PROFILE
Pt tolerated manual PD overnight. See flowsheets for dwelling/draining times of manual PD bags. Pt didn't complain of any pain, BiPap worn ~7 hours. All questions answered. Bedside report given to oncoming RN. Care continues.   for the last 2 years